# Patient Record
Sex: MALE | Race: WHITE | Employment: OTHER | URBAN - METROPOLITAN AREA
[De-identification: names, ages, dates, MRNs, and addresses within clinical notes are randomized per-mention and may not be internally consistent; named-entity substitution may affect disease eponyms.]

---

## 2017-04-11 PROBLEM — K21.9 GASTROESOPHAGEAL REFLUX DISEASE WITHOUT ESOPHAGITIS: Status: ACTIVE | Noted: 2017-04-11

## 2017-10-25 PROBLEM — M54.2 CERVICALGIA: Status: ACTIVE | Noted: 2017-10-25

## 2018-04-30 ENCOUNTER — OFFICE VISIT (OUTPATIENT)
Dept: FAMILY MEDICINE CLINIC | Age: 63
End: 2018-04-30
Payer: COMMERCIAL

## 2018-04-30 VITALS
WEIGHT: 224 LBS | RESPIRATION RATE: 20 BRPM | HEART RATE: 60 BPM | HEIGHT: 72 IN | OXYGEN SATURATION: 98 % | BODY MASS INDEX: 30.34 KG/M2 | SYSTOLIC BLOOD PRESSURE: 124 MMHG | DIASTOLIC BLOOD PRESSURE: 64 MMHG

## 2018-04-30 DIAGNOSIS — K21.9 GASTROESOPHAGEAL REFLUX DISEASE WITHOUT ESOPHAGITIS: ICD-10-CM

## 2018-04-30 DIAGNOSIS — B18.2 CHRONIC HEPATITIS C WITHOUT HEPATIC COMA (HCC): ICD-10-CM

## 2018-04-30 DIAGNOSIS — E11.42 TYPE 2 DIABETES MELLITUS WITH DIABETIC POLYNEUROPATHY, WITHOUT LONG-TERM CURRENT USE OF INSULIN (HCC): Primary | ICD-10-CM

## 2018-04-30 LAB — HBA1C MFR BLD: 7.2 %

## 2018-04-30 PROCEDURE — G8427 DOCREV CUR MEDS BY ELIG CLIN: HCPCS | Performed by: FAMILY MEDICINE

## 2018-04-30 PROCEDURE — G8417 CALC BMI ABV UP PARAM F/U: HCPCS | Performed by: FAMILY MEDICINE

## 2018-04-30 PROCEDURE — 83036 HEMOGLOBIN GLYCOSYLATED A1C: CPT | Performed by: FAMILY MEDICINE

## 2018-04-30 PROCEDURE — 3017F COLORECTAL CA SCREEN DOC REV: CPT | Performed by: FAMILY MEDICINE

## 2018-04-30 PROCEDURE — 1036F TOBACCO NON-USER: CPT | Performed by: FAMILY MEDICINE

## 2018-04-30 PROCEDURE — 2022F DILAT RTA XM EVC RTNOPTHY: CPT | Performed by: FAMILY MEDICINE

## 2018-04-30 PROCEDURE — 99214 OFFICE O/P EST MOD 30 MIN: CPT | Performed by: FAMILY MEDICINE

## 2018-04-30 PROCEDURE — 3045F PR MOST RECENT HEMOGLOBIN A1C LEVEL 7.0-9.0%: CPT | Performed by: FAMILY MEDICINE

## 2018-04-30 RX ORDER — LANOLIN ALCOHOL/MO/W.PET/CERES
3 CREAM (GRAM) TOPICAL DAILY
Qty: 30 TABLET | Refills: 5 | Status: SHIPPED | OUTPATIENT
Start: 2018-04-30 | End: 2018-11-27 | Stop reason: SDUPTHER

## 2018-04-30 RX ORDER — OMEPRAZOLE 20 MG/1
20 CAPSULE, DELAYED RELEASE ORAL DAILY
Qty: 30 CAPSULE | Refills: 2 | Status: SHIPPED | OUTPATIENT
Start: 2018-04-30 | End: 2019-04-02 | Stop reason: SDUPTHER

## 2018-04-30 RX ORDER — OMEPRAZOLE 40 MG/1
40 CAPSULE, DELAYED RELEASE ORAL
Refills: 3 | COMMUNITY
Start: 2018-03-28 | End: 2018-04-30

## 2018-04-30 ASSESSMENT — ENCOUNTER SYMPTOMS
DIARRHEA: 0
NAUSEA: 0
VOMITING: 0
SHORTNESS OF BREATH: 0
BLURRED VISION: 0

## 2018-05-17 ENCOUNTER — HOSPITAL ENCOUNTER (OUTPATIENT)
Age: 63
Discharge: HOME | End: 2018-05-17
Payer: COMMERCIAL

## 2018-05-17 LAB
ALBUMIN SERPL-MCNC: 4 G/DL (ref 3.5–5.2)
ALP BLD-CCNC: 98 U/L (ref 40–129)
ALT SERPL-CCNC: 110 U/L (ref 0–40)
AMPHETAMINE SCREEN, URINE: NOT DETECTED
ANION GAP SERPL CALCULATED.3IONS-SCNC: 10 MMOL/L (ref 7–16)
AST SERPL-CCNC: 57 U/L (ref 0–39)
BARBITURATE SCREEN URINE: NOT DETECTED
BASOPHILS ABSOLUTE: 0.05 E9/L (ref 0–0.2)
BASOPHILS RELATIVE PERCENT: 0.8 % (ref 0–2)
BENZODIAZEPINE SCREEN, URINE: NOT DETECTED
BILIRUB SERPL-MCNC: 0.5 MG/DL (ref 0–1.2)
BUN BLDV-MCNC: 20 MG/DL (ref 8–23)
CALCIUM SERPL-MCNC: 9.3 MG/DL (ref 8.6–10.2)
CANNABINOID SCREEN URINE: NOT DETECTED
CHLORIDE BLD-SCNC: 101 MMOL/L (ref 98–107)
CO2: 28 MMOL/L (ref 22–29)
COCAINE METABOLITE SCREEN URINE: NOT DETECTED
CREAT SERPL-MCNC: 1.1 MG/DL (ref 0.7–1.2)
EOSINOPHILS ABSOLUTE: 0.24 E9/L (ref 0.05–0.5)
EOSINOPHILS RELATIVE PERCENT: 3.6 % (ref 0–6)
GFR AFRICAN AMERICAN: >60
GFR NON-AFRICAN AMERICAN: >60 ML/MIN/1.73
GLUCOSE BLD-MCNC: 241 MG/DL (ref 74–109)
HCT VFR BLD CALC: 43.8 % (ref 37–54)
HEMOGLOBIN: 15.4 G/DL (ref 12.5–16.5)
IMMATURE GRANULOCYTES #: 0.02 E9/L
IMMATURE GRANULOCYTES %: 0.3 % (ref 0–5)
INR BLD: 1
LYMPHOCYTES ABSOLUTE: 1.96 E9/L (ref 1.5–4)
LYMPHOCYTES RELATIVE PERCENT: 29.5 % (ref 20–42)
MCH RBC QN AUTO: 32.8 PG (ref 26–35)
MCHC RBC AUTO-ENTMCNC: 35.2 % (ref 32–34.5)
MCV RBC AUTO: 93.4 FL (ref 80–99.9)
METHADONE SCREEN, URINE: NOT DETECTED
MONOCYTES ABSOLUTE: 0.6 E9/L (ref 0.1–0.95)
MONOCYTES RELATIVE PERCENT: 9 % (ref 2–12)
NEUTROPHILS ABSOLUTE: 3.78 E9/L (ref 1.8–7.3)
NEUTROPHILS RELATIVE PERCENT: 56.8 % (ref 43–80)
OPIATE SCREEN URINE: NOT DETECTED
PDW BLD-RTO: 11.4 FL (ref 11.5–15)
PHENCYCLIDINE SCREEN URINE: NOT DETECTED
PLATELET # BLD: 223 E9/L (ref 130–450)
PMV BLD AUTO: 10.4 FL (ref 7–12)
POTASSIUM SERPL-SCNC: 4.5 MMOL/L (ref 3.5–5)
PROPOXYPHENE SCREEN: NOT DETECTED
PROTHROMBIN TIME: 11.3 SEC (ref 9.3–12.4)
RBC # BLD: 4.69 E12/L (ref 3.8–5.8)
SODIUM BLD-SCNC: 139 MMOL/L (ref 132–146)
TOTAL PROTEIN: 7 G/DL (ref 6.4–8.3)
WBC # BLD: 6.7 E9/L (ref 4.5–11.5)

## 2018-05-17 PROCEDURE — 80053 COMPREHEN METABOLIC PANEL: CPT

## 2018-05-17 PROCEDURE — 87522 HEPATITIS C REVRS TRNSCRPJ: CPT

## 2018-05-17 PROCEDURE — 80307 DRUG TEST PRSMV CHEM ANLYZR: CPT

## 2018-05-17 PROCEDURE — 85025 COMPLETE CBC W/AUTO DIFF WBC: CPT

## 2018-05-17 PROCEDURE — 36415 COLL VENOUS BLD VENIPUNCTURE: CPT

## 2018-05-17 PROCEDURE — 85610 PROTHROMBIN TIME: CPT

## 2018-05-20 LAB — ALCOHOL URINE: NOT DETECTED MG/DL

## 2018-05-21 LAB
EER HCV RNA QNT PCR: ABNORMAL
HCV RNA QNT REAL-TIME PCR INTERP: DETECTED
HCV RNA, QUANTITATIVE REAL TIME PCR: 6.5 LOG IU
HEPATITIS C RNA PCR QUANT: ABNORMAL IU/ML

## 2018-06-15 ENCOUNTER — HOSPITAL ENCOUNTER (OUTPATIENT)
Age: 63
Discharge: HOME | End: 2018-06-15
Payer: COMMERCIAL

## 2018-06-15 LAB
ALCOHOL URINE: NOT DETECTED MG/DL
AMPHETAMINE SCREEN, URINE: NOT DETECTED
BARBITURATE SCREEN URINE: NOT DETECTED
BENZODIAZEPINE SCREEN, URINE: NOT DETECTED
CANNABINOID SCREEN URINE: NOT DETECTED
COCAINE METABOLITE SCREEN URINE: NOT DETECTED
METHADONE SCREEN, URINE: NOT DETECTED
OPIATE SCREEN URINE: NOT DETECTED
PHENCYCLIDINE SCREEN URINE: NOT DETECTED
PROPOXYPHENE SCREEN: NOT DETECTED

## 2018-06-15 PROCEDURE — 80307 DRUG TEST PRSMV CHEM ANLYZR: CPT

## 2018-07-16 ENCOUNTER — HOSPITAL ENCOUNTER (OUTPATIENT)
Age: 63
Discharge: HOME | End: 2018-07-16
Payer: COMMERCIAL

## 2018-07-16 PROCEDURE — 80307 DRUG TEST PRSMV CHEM ANLYZR: CPT

## 2018-07-30 ENCOUNTER — OFFICE VISIT (OUTPATIENT)
Dept: FAMILY MEDICINE CLINIC | Age: 63
End: 2018-07-30
Payer: COMMERCIAL

## 2018-07-30 VITALS
RESPIRATION RATE: 20 BRPM | HEIGHT: 72 IN | OXYGEN SATURATION: 98 % | HEART RATE: 56 BPM | BODY MASS INDEX: 29.66 KG/M2 | DIASTOLIC BLOOD PRESSURE: 78 MMHG | SYSTOLIC BLOOD PRESSURE: 124 MMHG | WEIGHT: 219 LBS

## 2018-07-30 DIAGNOSIS — F33.1 MODERATE EPISODE OF RECURRENT MAJOR DEPRESSIVE DISORDER (HCC): ICD-10-CM

## 2018-07-30 DIAGNOSIS — E11.42 TYPE 2 DIABETES MELLITUS WITH DIABETIC POLYNEUROPATHY, WITHOUT LONG-TERM CURRENT USE OF INSULIN (HCC): Primary | ICD-10-CM

## 2018-07-30 DIAGNOSIS — T07.XXXA ABRASIONS OF MULTIPLE SITES: ICD-10-CM

## 2018-07-30 LAB — HBA1C MFR BLD: 7.6 %

## 2018-07-30 PROCEDURE — G8427 DOCREV CUR MEDS BY ELIG CLIN: HCPCS | Performed by: FAMILY MEDICINE

## 2018-07-30 PROCEDURE — 2022F DILAT RTA XM EVC RTNOPTHY: CPT | Performed by: FAMILY MEDICINE

## 2018-07-30 PROCEDURE — 3017F COLORECTAL CA SCREEN DOC REV: CPT | Performed by: FAMILY MEDICINE

## 2018-07-30 PROCEDURE — 3045F PR MOST RECENT HEMOGLOBIN A1C LEVEL 7.0-9.0%: CPT | Performed by: FAMILY MEDICINE

## 2018-07-30 PROCEDURE — G8417 CALC BMI ABV UP PARAM F/U: HCPCS | Performed by: FAMILY MEDICINE

## 2018-07-30 PROCEDURE — 1036F TOBACCO NON-USER: CPT | Performed by: FAMILY MEDICINE

## 2018-07-30 PROCEDURE — 83036 HEMOGLOBIN GLYCOSYLATED A1C: CPT | Performed by: FAMILY MEDICINE

## 2018-07-30 PROCEDURE — 99214 OFFICE O/P EST MOD 30 MIN: CPT | Performed by: FAMILY MEDICINE

## 2018-07-30 RX ORDER — METFORMIN HYDROCHLORIDE 500 MG/1
500 TABLET, EXTENDED RELEASE ORAL
Qty: 30 TABLET | Refills: 3 | Status: SHIPPED | OUTPATIENT
Start: 2018-07-30 | End: 2018-10-03 | Stop reason: SINTOL

## 2018-07-30 ASSESSMENT — PATIENT HEALTH QUESTIONNAIRE - PHQ9
2. FEELING DOWN, DEPRESSED OR HOPELESS: 1
SUM OF ALL RESPONSES TO PHQ9 QUESTIONS 1 & 2: 1
1. LITTLE INTEREST OR PLEASURE IN DOING THINGS: 0
SUM OF ALL RESPONSES TO PHQ QUESTIONS 1-9: 1

## 2018-07-30 ASSESSMENT — ENCOUNTER SYMPTOMS
BLURRED VISION: 0
VOMITING: 0
SHORTNESS OF BREATH: 0
DIARRHEA: 0
NAUSEA: 0

## 2018-07-30 NOTE — PROGRESS NOTES
 Years of education: N/A     Social History Main Topics    Smoking status: Never Smoker    Smokeless tobacco: Never Used    Alcohol use No    Drug use: No    Sexual activity: Yes     Partners: Female     Other Topics Concern    None     Social History Narrative    None       I have reviewed Nakia's allergies, medications, problem list, medical, social and family history and have updated as needed in the electronic medical record    Current Outpatient Prescriptions   Medication Sig Dispense Refill    aspirin 81 MG tablet Take 81 mg by mouth daily      metFORMIN (GLUCOPHAGE-XR) 500 MG extended release tablet Take 1 tablet by mouth daily (with breakfast) 30 tablet 3    mupirocin (BACTROBAN) 2 % ointment Apply 3 times daily. 45 Tube 3    omeprazole (PRILOSEC) 20 MG delayed release capsule Take 1 capsule by mouth daily 30 capsule 2    glucose blood VI test strips (FREESTYLE LITE) strip Check blood sugar  strip 12    diclofenac sodium 1 % GEL Apply 2 g topically 2 times daily 1 Tube 5    melatonin (RA MELATONIN) 3 MG TABS tablet Take 1 tablet by mouth daily 30 tablet 5    DULoxetine (CYMBALTA) 60 MG extended release capsule 30 mg daily   2    Blood Glucose Monitoring Suppl (FREESTYLE LITE) VASU Check blood sugar BID 1 Device 0     No current facility-administered medications for this visit. Review Of Systems:    Review of Systems   Eyes: Negative for blurred vision. Respiratory: Negative for shortness of breath. Cardiovascular: Negative for chest pain, palpitations and leg swelling. Gastrointestinal: Negative for diarrhea, nausea and vomiting. Genitourinary: Negative for dysuria, frequency and urgency. +nocturia   Skin: Negative for rash. Psychiatric/Behavioral: Negative for depression.            OBJECTIVE:     VS:  Wt Readings from Last 3 Encounters:   07/30/18 219 lb (99.3 kg)   04/30/18 224 lb (101.6 kg)   01/31/18 228 lb (103.4 kg)                   Vitals:

## 2018-08-07 ENCOUNTER — HOSPITAL ENCOUNTER (OUTPATIENT)
Age: 63
Discharge: HOME | End: 2018-08-07
Payer: COMMERCIAL

## 2018-08-07 LAB
ALBUMIN SERPL-MCNC: 4.2 G/DL (ref 3.5–5.2)
ALP BLD-CCNC: 80 U/L (ref 40–129)
ALT SERPL-CCNC: 100 U/L (ref 0–40)
ANION GAP SERPL CALCULATED.3IONS-SCNC: 10 MMOL/L (ref 7–16)
AST SERPL-CCNC: 63 U/L (ref 0–39)
BASOPHILS ABSOLUTE: 0.04 E9/L (ref 0–0.2)
BASOPHILS RELATIVE PERCENT: 0.7 % (ref 0–2)
BILIRUB SERPL-MCNC: 0.8 MG/DL (ref 0–1.2)
BUN BLDV-MCNC: 19 MG/DL (ref 8–23)
CALCIUM SERPL-MCNC: 9.6 MG/DL (ref 8.6–10.2)
CHLORIDE BLD-SCNC: 101 MMOL/L (ref 98–107)
CO2: 29 MMOL/L (ref 22–29)
CREAT SERPL-MCNC: 1.1 MG/DL (ref 0.7–1.2)
EOSINOPHILS ABSOLUTE: 0.19 E9/L (ref 0.05–0.5)
EOSINOPHILS RELATIVE PERCENT: 3.2 % (ref 0–6)
GFR AFRICAN AMERICAN: >60
GFR NON-AFRICAN AMERICAN: >60 ML/MIN/1.73
GLUCOSE BLD-MCNC: 117 MG/DL (ref 74–109)
HCT VFR BLD CALC: 45.2 % (ref 37–54)
HEMOGLOBIN: 15.4 G/DL (ref 12.5–16.5)
IMMATURE GRANULOCYTES #: 0.03 E9/L
IMMATURE GRANULOCYTES %: 0.5 % (ref 0–5)
LYMPHOCYTES ABSOLUTE: 1.96 E9/L (ref 1.5–4)
LYMPHOCYTES RELATIVE PERCENT: 33.1 % (ref 20–42)
MCH RBC QN AUTO: 31.8 PG (ref 26–35)
MCHC RBC AUTO-ENTMCNC: 34.1 % (ref 32–34.5)
MCV RBC AUTO: 93.2 FL (ref 80–99.9)
MONOCYTES ABSOLUTE: 0.58 E9/L (ref 0.1–0.95)
MONOCYTES RELATIVE PERCENT: 9.8 % (ref 2–12)
NEUTROPHILS ABSOLUTE: 3.13 E9/L (ref 1.8–7.3)
NEUTROPHILS RELATIVE PERCENT: 52.7 % (ref 43–80)
PDW BLD-RTO: 11.5 FL (ref 11.5–15)
PLATELET # BLD: 240 E9/L (ref 130–450)
PMV BLD AUTO: 10.3 FL (ref 7–12)
POTASSIUM SERPL-SCNC: 4.2 MMOL/L (ref 3.5–5)
RBC # BLD: 4.85 E12/L (ref 3.8–5.8)
SODIUM BLD-SCNC: 140 MMOL/L (ref 132–146)
TOTAL PROTEIN: 7.2 G/DL (ref 6.4–8.3)
WBC # BLD: 5.9 E9/L (ref 4.5–11.5)

## 2018-08-07 PROCEDURE — 36415 COLL VENOUS BLD VENIPUNCTURE: CPT

## 2018-08-07 PROCEDURE — 80053 COMPREHEN METABOLIC PANEL: CPT

## 2018-08-07 PROCEDURE — 86706 HEP B SURFACE ANTIBODY: CPT

## 2018-08-07 PROCEDURE — 87536 HIV-1 QUANT&REVRSE TRNSCRPJ: CPT

## 2018-08-07 PROCEDURE — 85025 COMPLETE CBC W/AUTO DIFF WBC: CPT

## 2018-08-07 PROCEDURE — 82105 ALPHA-FETOPROTEIN SERUM: CPT

## 2018-08-08 LAB — HBV SURFACE AB TITR SER: NORMAL {TITER}

## 2018-08-09 LAB — AFP-TUMOR MARKER: 7 NG/ML (ref 0–9)

## 2018-08-11 LAB
DIRECT EXAM: NORMAL
SPECIMEN: NORMAL

## 2018-08-13 ENCOUNTER — TELEPHONE (OUTPATIENT)
Dept: FAMILY MEDICINE CLINIC | Age: 63
End: 2018-08-13

## 2018-08-28 ENCOUNTER — HOSPITAL ENCOUNTER (OUTPATIENT)
Age: 63
Discharge: HOME | End: 2018-08-28
Payer: COMMERCIAL

## 2018-08-28 LAB
ALBUMIN SERPL-MCNC: 4.1 G/DL (ref 3.5–5.2)
ALP BLD-CCNC: 86 U/L (ref 40–129)
ALT SERPL-CCNC: 22 U/L (ref 0–40)
ANION GAP SERPL CALCULATED.3IONS-SCNC: 10 MMOL/L (ref 7–16)
AST SERPL-CCNC: 17 U/L (ref 0–39)
BASOPHILS ABSOLUTE: 0.07 E9/L (ref 0–0.2)
BASOPHILS RELATIVE PERCENT: 1.2 % (ref 0–2)
BILIRUB SERPL-MCNC: 0.6 MG/DL (ref 0–1.2)
BUN BLDV-MCNC: 20 MG/DL (ref 8–23)
CALCIUM SERPL-MCNC: 9.4 MG/DL (ref 8.6–10.2)
CHLORIDE BLD-SCNC: 100 MMOL/L (ref 98–107)
CO2: 28 MMOL/L (ref 22–29)
CREAT SERPL-MCNC: 1 MG/DL (ref 0.7–1.2)
EOSINOPHILS ABSOLUTE: 0.26 E9/L (ref 0.05–0.5)
EOSINOPHILS RELATIVE PERCENT: 4.5 % (ref 0–6)
GFR AFRICAN AMERICAN: >60
GFR NON-AFRICAN AMERICAN: >60 ML/MIN/1.73
GLUCOSE BLD-MCNC: 241 MG/DL (ref 74–109)
HCT VFR BLD CALC: 43.8 % (ref 37–54)
HEMOGLOBIN: 15.2 G/DL (ref 12.5–16.5)
IMMATURE GRANULOCYTES #: 0.02 E9/L
IMMATURE GRANULOCYTES %: 0.3 % (ref 0–5)
LYMPHOCYTES ABSOLUTE: 2.05 E9/L (ref 1.5–4)
LYMPHOCYTES RELATIVE PERCENT: 35.2 % (ref 20–42)
MCH RBC QN AUTO: 32.1 PG (ref 26–35)
MCHC RBC AUTO-ENTMCNC: 34.7 % (ref 32–34.5)
MCV RBC AUTO: 92.6 FL (ref 80–99.9)
MONOCYTES ABSOLUTE: 0.54 E9/L (ref 0.1–0.95)
MONOCYTES RELATIVE PERCENT: 9.3 % (ref 2–12)
NEUTROPHILS ABSOLUTE: 2.89 E9/L (ref 1.8–7.3)
NEUTROPHILS RELATIVE PERCENT: 49.5 % (ref 43–80)
PDW BLD-RTO: 11.6 FL (ref 11.5–15)
PLATELET # BLD: 229 E9/L (ref 130–450)
PMV BLD AUTO: 10.2 FL (ref 7–12)
POTASSIUM SERPL-SCNC: 4.6 MMOL/L (ref 3.5–5)
RBC # BLD: 4.73 E12/L (ref 3.8–5.8)
SODIUM BLD-SCNC: 138 MMOL/L (ref 132–146)
TOTAL PROTEIN: 7.1 G/DL (ref 6.4–8.3)
WBC # BLD: 5.8 E9/L (ref 4.5–11.5)

## 2018-08-28 PROCEDURE — 80053 COMPREHEN METABOLIC PANEL: CPT

## 2018-08-28 PROCEDURE — 36415 COLL VENOUS BLD VENIPUNCTURE: CPT

## 2018-08-28 PROCEDURE — 85025 COMPLETE CBC W/AUTO DIFF WBC: CPT

## 2018-10-03 ENCOUNTER — OFFICE VISIT (OUTPATIENT)
Dept: FAMILY MEDICINE CLINIC | Age: 63
End: 2018-10-03
Payer: COMMERCIAL

## 2018-10-03 VITALS
HEIGHT: 72 IN | DIASTOLIC BLOOD PRESSURE: 62 MMHG | RESPIRATION RATE: 20 BRPM | SYSTOLIC BLOOD PRESSURE: 124 MMHG | OXYGEN SATURATION: 98 % | BODY MASS INDEX: 28.85 KG/M2 | HEART RATE: 54 BPM | WEIGHT: 213 LBS

## 2018-10-03 DIAGNOSIS — Z12.5 PROSTATE CANCER SCREENING: ICD-10-CM

## 2018-10-03 DIAGNOSIS — R05.9 COUGH: ICD-10-CM

## 2018-10-03 DIAGNOSIS — E11.42 TYPE 2 DIABETES MELLITUS WITH DIABETIC POLYNEUROPATHY, WITHOUT LONG-TERM CURRENT USE OF INSULIN (HCC): Primary | ICD-10-CM

## 2018-10-03 DIAGNOSIS — K21.9 GASTROESOPHAGEAL REFLUX DISEASE WITHOUT ESOPHAGITIS: Chronic | ICD-10-CM

## 2018-10-03 LAB — HBA1C MFR BLD: 7.1 %

## 2018-10-03 PROCEDURE — G8417 CALC BMI ABV UP PARAM F/U: HCPCS | Performed by: FAMILY MEDICINE

## 2018-10-03 PROCEDURE — G8484 FLU IMMUNIZE NO ADMIN: HCPCS | Performed by: FAMILY MEDICINE

## 2018-10-03 PROCEDURE — 1036F TOBACCO NON-USER: CPT | Performed by: FAMILY MEDICINE

## 2018-10-03 PROCEDURE — 3045F PR MOST RECENT HEMOGLOBIN A1C LEVEL 7.0-9.0%: CPT | Performed by: FAMILY MEDICINE

## 2018-10-03 PROCEDURE — G8427 DOCREV CUR MEDS BY ELIG CLIN: HCPCS | Performed by: FAMILY MEDICINE

## 2018-10-03 PROCEDURE — 3017F COLORECTAL CA SCREEN DOC REV: CPT | Performed by: FAMILY MEDICINE

## 2018-10-03 PROCEDURE — 99214 OFFICE O/P EST MOD 30 MIN: CPT | Performed by: FAMILY MEDICINE

## 2018-10-03 PROCEDURE — 83036 HEMOGLOBIN GLYCOSYLATED A1C: CPT | Performed by: FAMILY MEDICINE

## 2018-10-03 PROCEDURE — 2022F DILAT RTA XM EVC RTNOPTHY: CPT | Performed by: FAMILY MEDICINE

## 2018-10-03 RX ORDER — DEXTROMETHORPHAN HYDROBROMIDE AND PROMETHAZINE HYDROCHLORIDE 15; 6.25 MG/5ML; MG/5ML
5 SYRUP ORAL 4 TIMES DAILY PRN
Qty: 240 ML | Refills: 0 | Status: SHIPPED | OUTPATIENT
Start: 2018-10-03 | End: 2019-01-02

## 2018-10-03 RX ORDER — GABAPENTIN 300 MG/1
300 CAPSULE ORAL 3 TIMES DAILY
COMMUNITY

## 2018-10-03 ASSESSMENT — PATIENT HEALTH QUESTIONNAIRE - PHQ9
1. LITTLE INTEREST OR PLEASURE IN DOING THINGS: 0
2. FEELING DOWN, DEPRESSED OR HOPELESS: 0
SUM OF ALL RESPONSES TO PHQ QUESTIONS 1-9: 0
SUM OF ALL RESPONSES TO PHQ QUESTIONS 1-9: 0
SUM OF ALL RESPONSES TO PHQ9 QUESTIONS 1 & 2: 0

## 2018-10-03 ASSESSMENT — ENCOUNTER SYMPTOMS
SORE THROAT: 0
WHEEZING: 1
NAUSEA: 0
VOMITING: 0
SHORTNESS OF BREATH: 0
COUGH: 1
DIARRHEA: 0
BLURRED VISION: 0

## 2018-10-03 NOTE — PROGRESS NOTES
OFFICE PROGRESS NOTE      SUBJECTIVE:        Patient ID:   Jazz Dover is a 65 y.o. male who presents for   Chief Complaint   Patient presents with    Diabetes       HPI:   Patient is here to follow up on diabetes. Fasting blood sugars:100-110's Midday blood sugars: not checking. Patient checks blood glucose 1 times per day. Patient is following diabetic diet. Patient is a nonsmoker. Last ophthalmology visit: 2/18. Patient is not able to tolerate daily statin. Patient has had cough for past 2 weeks--green sputum. Patient has been wheezing also. Patient doing well on current regimen for GERD. Prior to Admission medications    Medication Sig Start Date End Date Taking? Authorizing Provider   gabapentin (NEURONTIN) 300 MG capsule Take 300 mg by mouth 3 times daily. .   Yes Historical Provider, MD   metFORMIN (GLUCOPHAGE) 500 MG tablet Take 1 tablet by mouth 2 times daily (with meals) 10/3/18  Yes Alonzo Camacho MD   promethazine-dextromethorphan (PROMETHAZINE-DM) 6.25-15 MG/5ML syrup Take 5 mLs by mouth 4 times daily as needed for Cough 10/3/18  Yes Alonzo Camacho MD   Glecaprevir-Pibrentasvir (MAVYRET) 100-40 MG TABS Take 3 tablets by mouth daily   Yes Historical Provider, MD   aspirin 81 MG tablet Take 81 mg by mouth daily   Yes Historical Provider, MD   omeprazole (PRILOSEC) 20 MG delayed release capsule Take 1 capsule by mouth daily 4/30/18  Yes Alonzo Camacho MD   glucose blood VI test strips (FREESTYLE LITE) strip Check blood sugar BID 4/30/18  Yes Alonzo Camacho MD   diclofenac sodium 1 % GEL Apply 2 g topically 2 times daily 4/30/18  Yes Alonzo Camacho MD   melatonin (RA MELATONIN) 3 MG TABS tablet Take 1 tablet by mouth daily 4/30/18  Yes Alonzo Camacho MD   Blood Glucose Monitoring Suppl (FREESTYLE LITE) VASU Check blood sugar BID 3/23/16  Yes Alonzo Camacho MD     Social History     Social History    urgency. Skin: Negative for rash. Psychiatric/Behavioral: Negative for depression. OBJECTIVE:     VS:  Wt Readings from Last 3 Encounters:   10/03/18 213 lb (96.6 kg)   07/30/18 219 lb (99.3 kg)   04/30/18 224 lb (101.6 kg)                   Vitals:    10/03/18 0914   BP: 124/62   Pulse: 54   Resp: 20   SpO2: 98%       Physical Exam   Constitutional: He is oriented to person, place, and time and well-developed, well-nourished, and in no distress. Neck: Neck supple. Carotid bruit is not present. Cardiovascular: Normal rate, regular rhythm and normal heart sounds. Exam reveals no gallop. No murmur heard. Pulmonary/Chest: Effort normal and breath sounds normal. He has no wheezes. He has no rales. Abdominal: Soft. Bowel sounds are normal. He exhibits no distension. There is no tenderness. Musculoskeletal: He exhibits no edema. Neurological: He is alert and oriented to person, place, and time. Skin: Skin is warm and dry. Psychiatric: Affect normal.   Vitals reviewed. Results for orders placed or performed in visit on 10/03/18   POCT glycosylated hemoglobin (Hb A1C)   Result Value Ref Range    Hemoglobin A1C 7.1 %         Nakia was seen today for diabetes. Diagnoses and all orders for this visit:    Type 2 diabetes mellitus with diabetic polyneuropathy, without long-term current use of insulin ([facility])  -     POCT glycosylated hemoglobin (Hb A1C)  -     metFORMIN (GLUCOPHAGE) 500 MG tablet; Take 1 tablet by mouth 2 times daily (with meals)  -     CBC; Future  -     Comprehensive Metabolic Panel; Future  -     Lipid Panel; Future  -     Microalbumin / Creatinine Urine Ratio; Future  -     Diabetic Foot Exam    Gastroesophageal reflux disease without esophagitis        -     Continue omeprazole    Cough  -     promethazine-dextromethorphan (PROMETHAZINE-DM) 6.25-15 MG/5ML syrup;  Take 5 mLs by mouth 4 times daily as needed for Cough    Prostate cancer screening  -     Psa screening;

## 2018-10-03 NOTE — PATIENT INSTRUCTIONS
Version: 11.7  © 5681-0155 [employer]. Care instructions adapted under license by [facility] [place] ([facility]). If you have questions about a medical condition or this instruction, always ask your healthcare professional. [address] any warranty or liability for your use of this information.

## 2018-10-05 ENCOUNTER — HOSPITAL ENCOUNTER (OUTPATIENT)
Age: 63
Discharge: HOME | End: 2018-10-05
Payer: COMMERCIAL

## 2018-10-05 LAB
ALBUMIN SERPL-MCNC: 3.8 G/DL (ref 3.5–5.2)
ALP BLD-CCNC: 89 U/L (ref 40–129)
ALT SERPL-CCNC: 16 U/L (ref 0–40)
ANION GAP SERPL CALCULATED.3IONS-SCNC: 12 MMOL/L (ref 7–16)
AST SERPL-CCNC: 16 U/L (ref 0–39)
BASOPHILS ABSOLUTE: 0.05 E9/L (ref 0–0.2)
BASOPHILS RELATIVE PERCENT: 0.8 % (ref 0–2)
BILIRUB SERPL-MCNC: 0.5 MG/DL (ref 0–1.2)
BUN BLDV-MCNC: 18 MG/DL (ref 8–23)
CALCIUM SERPL-MCNC: 9.2 MG/DL (ref 8.6–10.2)
CHLORIDE BLD-SCNC: 101 MMOL/L (ref 98–107)
CO2: 27 MMOL/L (ref 22–29)
CREAT SERPL-MCNC: 1.1 MG/DL (ref 0.7–1.2)
EOSINOPHILS ABSOLUTE: 0.19 E9/L (ref 0.05–0.5)
EOSINOPHILS RELATIVE PERCENT: 3.2 % (ref 0–6)
GFR AFRICAN AMERICAN: >60
GFR NON-AFRICAN AMERICAN: >60 ML/MIN/1.73
GLUCOSE BLD-MCNC: 289 MG/DL (ref 74–109)
HCT VFR BLD CALC: 42 % (ref 37–54)
HEMOGLOBIN: 14.1 G/DL (ref 12.5–16.5)
IMMATURE GRANULOCYTES #: 0.07 E9/L
IMMATURE GRANULOCYTES %: 1.2 % (ref 0–5)
LYMPHOCYTES ABSOLUTE: 1.85 E9/L (ref 1.5–4)
LYMPHOCYTES RELATIVE PERCENT: 31.3 % (ref 20–42)
MCH RBC QN AUTO: 31.8 PG (ref 26–35)
MCHC RBC AUTO-ENTMCNC: 33.6 % (ref 32–34.5)
MCV RBC AUTO: 94.8 FL (ref 80–99.9)
MONOCYTES ABSOLUTE: 0.44 E9/L (ref 0.1–0.95)
MONOCYTES RELATIVE PERCENT: 7.4 % (ref 2–12)
NEUTROPHILS ABSOLUTE: 3.31 E9/L (ref 1.8–7.3)
NEUTROPHILS RELATIVE PERCENT: 56.1 % (ref 43–80)
PDW BLD-RTO: 11.4 FL (ref 11.5–15)
PLATELET # BLD: 280 E9/L (ref 130–450)
PMV BLD AUTO: 10.1 FL (ref 7–12)
POTASSIUM SERPL-SCNC: 5.1 MMOL/L (ref 3.5–5)
RBC # BLD: 4.43 E12/L (ref 3.8–5.8)
SODIUM BLD-SCNC: 140 MMOL/L (ref 132–146)
TOTAL PROTEIN: 6.9 G/DL (ref 6.4–8.3)
WBC # BLD: 5.9 E9/L (ref 4.5–11.5)

## 2018-10-05 PROCEDURE — 80053 COMPREHEN METABOLIC PANEL: CPT

## 2018-10-05 PROCEDURE — 36415 COLL VENOUS BLD VENIPUNCTURE: CPT

## 2018-10-05 PROCEDURE — 85025 COMPLETE CBC W/AUTO DIFF WBC: CPT

## 2018-10-05 PROCEDURE — 87522 HEPATITIS C REVRS TRNSCRPJ: CPT

## 2018-11-28 RX ORDER — LANOLIN ALCOHOL/MO/W.PET/CERES
CREAM (GRAM) TOPICAL
Qty: 30 TABLET | Refills: 4 | Status: SHIPPED | OUTPATIENT
Start: 2018-11-28 | End: 2019-04-20 | Stop reason: SDUPTHER

## 2018-11-30 LAB
HCV QNT BY NAAT IU/ML: NOT DETECTED IU/ML
HCV QNT BY NAAT LOG IU/ML: NOT DETECTED LOG IU/ML

## 2018-12-11 ENCOUNTER — HOSPITAL ENCOUNTER (OUTPATIENT)
Age: 63
Discharge: HOME | End: 2018-12-11
Payer: COMMERCIAL

## 2018-12-11 DIAGNOSIS — E11.42 TYPE 2 DIABETES MELLITUS WITH DIABETIC POLYNEUROPATHY, WITHOUT LONG-TERM CURRENT USE OF INSULIN (HCC): ICD-10-CM

## 2018-12-11 DIAGNOSIS — Z12.5 PROSTATE CANCER SCREENING: ICD-10-CM

## 2018-12-11 LAB
ALBUMIN SERPL-MCNC: 4.2 G/DL (ref 3.5–5.2)
ALP BLD-CCNC: 74 U/L (ref 40–129)
ALT SERPL-CCNC: 29 U/L (ref 0–40)
ANION GAP SERPL CALCULATED.3IONS-SCNC: 9 MMOL/L (ref 7–16)
AST SERPL-CCNC: 22 U/L (ref 0–39)
BILIRUB SERPL-MCNC: 0.6 MG/DL (ref 0–1.2)
BUN BLDV-MCNC: 17 MG/DL (ref 8–23)
CALCIUM SERPL-MCNC: 9.4 MG/DL (ref 8.6–10.2)
CHLORIDE BLD-SCNC: 99 MMOL/L (ref 98–107)
CHOLESTEROL, TOTAL: 161 MG/DL (ref 0–199)
CO2: 29 MMOL/L (ref 22–29)
CREAT SERPL-MCNC: 1.1 MG/DL (ref 0.7–1.2)
CREATININE URINE: 235 MG/DL (ref 40–278)
GFR AFRICAN AMERICAN: >60
GFR NON-AFRICAN AMERICAN: >60 ML/MIN/1.73
GLUCOSE BLD-MCNC: 177 MG/DL (ref 74–99)
HCT VFR BLD CALC: 44.4 % (ref 37–54)
HDLC SERPL-MCNC: 35 MG/DL
HEMOGLOBIN: 15.2 G/DL (ref 12.5–16.5)
LDL CHOLESTEROL CALCULATED: 101 MG/DL (ref 0–99)
MCH RBC QN AUTO: 31.9 PG (ref 26–35)
MCHC RBC AUTO-ENTMCNC: 34.2 % (ref 32–34.5)
MCV RBC AUTO: 93.3 FL (ref 80–99.9)
MICROALBUMIN UR-MCNC: <12 MG/L
MICROALBUMIN/CREAT UR-RTO: ABNORMAL (ref 0–30)
PDW BLD-RTO: 11.8 FL (ref 11.5–15)
PLATELET # BLD: 231 E9/L (ref 130–450)
PMV BLD AUTO: 10.5 FL (ref 7–12)
POTASSIUM SERPL-SCNC: 4.6 MMOL/L (ref 3.5–5)
PROSTATE SPECIFIC ANTIGEN: 2.94 NG/ML (ref 0–4)
RBC # BLD: 4.76 E12/L (ref 3.8–5.8)
SODIUM BLD-SCNC: 137 MMOL/L (ref 132–146)
TOTAL PROTEIN: 7.2 G/DL (ref 6.4–8.3)
TRIGL SERPL-MCNC: 127 MG/DL (ref 0–149)
VLDLC SERPL CALC-MCNC: 25 MG/DL
WBC # BLD: 6.5 E9/L (ref 4.5–11.5)

## 2018-12-11 PROCEDURE — 82044 UR ALBUMIN SEMIQUANTITATIVE: CPT

## 2018-12-11 PROCEDURE — 36415 COLL VENOUS BLD VENIPUNCTURE: CPT

## 2018-12-11 PROCEDURE — G0103 PSA SCREENING: HCPCS

## 2018-12-11 PROCEDURE — 82570 ASSAY OF URINE CREATININE: CPT

## 2018-12-11 PROCEDURE — 80061 LIPID PANEL: CPT

## 2018-12-11 PROCEDURE — 85027 COMPLETE CBC AUTOMATED: CPT

## 2018-12-11 PROCEDURE — 80053 COMPREHEN METABOLIC PANEL: CPT

## 2018-12-19 ENCOUNTER — HOSPITAL ENCOUNTER (OUTPATIENT)
Age: 63
Discharge: HOME | End: 2018-12-19
Payer: COMMERCIAL

## 2018-12-19 PROCEDURE — 87522 HEPATITIS C REVRS TRNSCRPJ: CPT

## 2018-12-22 LAB
HCV QNT BY NAAT IU/ML: NOT DETECTED IU/ML
HCV QNT BY NAAT LOG IU/ML: NOT DETECTED LOG IU/ML
INTERPRETATION: NOT DETECTED

## 2019-01-02 ENCOUNTER — OFFICE VISIT (OUTPATIENT)
Dept: FAMILY MEDICINE CLINIC | Age: 64
End: 2019-01-02
Payer: COMMERCIAL

## 2019-01-02 VITALS
BODY MASS INDEX: 30.61 KG/M2 | DIASTOLIC BLOOD PRESSURE: 74 MMHG | OXYGEN SATURATION: 98 % | HEIGHT: 72 IN | HEART RATE: 64 BPM | RESPIRATION RATE: 20 BRPM | WEIGHT: 226 LBS | SYSTOLIC BLOOD PRESSURE: 136 MMHG

## 2019-01-02 DIAGNOSIS — E11.42 TYPE 2 DIABETES MELLITUS WITH DIABETIC POLYNEUROPATHY, WITHOUT LONG-TERM CURRENT USE OF INSULIN (HCC): Primary | ICD-10-CM

## 2019-01-02 DIAGNOSIS — B18.2 CHRONIC HEPATITIS C WITHOUT HEPATIC COMA (HCC): ICD-10-CM

## 2019-01-02 DIAGNOSIS — E66.9 OBESITY (BMI 30-39.9): ICD-10-CM

## 2019-01-02 LAB — HBA1C MFR BLD: 6.7 %

## 2019-01-02 PROCEDURE — G8427 DOCREV CUR MEDS BY ELIG CLIN: HCPCS | Performed by: FAMILY MEDICINE

## 2019-01-02 PROCEDURE — 99213 OFFICE O/P EST LOW 20 MIN: CPT | Performed by: FAMILY MEDICINE

## 2019-01-02 PROCEDURE — 83036 HEMOGLOBIN GLYCOSYLATED A1C: CPT | Performed by: FAMILY MEDICINE

## 2019-01-02 PROCEDURE — G8484 FLU IMMUNIZE NO ADMIN: HCPCS | Performed by: FAMILY MEDICINE

## 2019-01-02 PROCEDURE — 3017F COLORECTAL CA SCREEN DOC REV: CPT | Performed by: FAMILY MEDICINE

## 2019-01-02 PROCEDURE — 3044F HG A1C LEVEL LT 7.0%: CPT | Performed by: FAMILY MEDICINE

## 2019-01-02 PROCEDURE — G8417 CALC BMI ABV UP PARAM F/U: HCPCS | Performed by: FAMILY MEDICINE

## 2019-01-02 PROCEDURE — 1036F TOBACCO NON-USER: CPT | Performed by: FAMILY MEDICINE

## 2019-01-02 PROCEDURE — 2022F DILAT RTA XM EVC RTNOPTHY: CPT | Performed by: FAMILY MEDICINE

## 2019-01-02 ASSESSMENT — ENCOUNTER SYMPTOMS
DIARRHEA: 0
NAUSEA: 0
VOMITING: 0
SHORTNESS OF BREATH: 1

## 2019-01-02 ASSESSMENT — PATIENT HEALTH QUESTIONNAIRE - PHQ9
2. FEELING DOWN, DEPRESSED OR HOPELESS: 0
SUM OF ALL RESPONSES TO PHQ QUESTIONS 1-9: 0
1. LITTLE INTEREST OR PLEASURE IN DOING THINGS: 0
SUM OF ALL RESPONSES TO PHQ9 QUESTIONS 1 & 2: 0
SUM OF ALL RESPONSES TO PHQ QUESTIONS 1-9: 0

## 2019-03-25 DIAGNOSIS — E11.42 TYPE 2 DIABETES MELLITUS WITH DIABETIC POLYNEUROPATHY, WITHOUT LONG-TERM CURRENT USE OF INSULIN (HCC): ICD-10-CM

## 2019-04-02 ENCOUNTER — OFFICE VISIT (OUTPATIENT)
Dept: FAMILY MEDICINE CLINIC | Age: 64
End: 2019-04-02
Payer: COMMERCIAL

## 2019-04-02 VITALS
WEIGHT: 226 LBS | HEIGHT: 72 IN | RESPIRATION RATE: 20 BRPM | HEART RATE: 80 BPM | SYSTOLIC BLOOD PRESSURE: 134 MMHG | BODY MASS INDEX: 30.61 KG/M2 | DIASTOLIC BLOOD PRESSURE: 80 MMHG | OXYGEN SATURATION: 98 %

## 2019-04-02 DIAGNOSIS — E11.42 TYPE 2 DIABETES MELLITUS WITH DIABETIC POLYNEUROPATHY, WITHOUT LONG-TERM CURRENT USE OF INSULIN (HCC): Primary | ICD-10-CM

## 2019-04-02 DIAGNOSIS — K21.9 GASTROESOPHAGEAL REFLUX DISEASE WITHOUT ESOPHAGITIS: ICD-10-CM

## 2019-04-02 DIAGNOSIS — M54.2 CERVICALGIA: ICD-10-CM

## 2019-04-02 LAB — HBA1C MFR BLD: 6.8 %

## 2019-04-02 PROCEDURE — 3017F COLORECTAL CA SCREEN DOC REV: CPT | Performed by: FAMILY MEDICINE

## 2019-04-02 PROCEDURE — 83036 HEMOGLOBIN GLYCOSYLATED A1C: CPT | Performed by: FAMILY MEDICINE

## 2019-04-02 PROCEDURE — 2022F DILAT RTA XM EVC RTNOPTHY: CPT | Performed by: FAMILY MEDICINE

## 2019-04-02 PROCEDURE — G8417 CALC BMI ABV UP PARAM F/U: HCPCS | Performed by: FAMILY MEDICINE

## 2019-04-02 PROCEDURE — 3044F HG A1C LEVEL LT 7.0%: CPT | Performed by: FAMILY MEDICINE

## 2019-04-02 PROCEDURE — G8427 DOCREV CUR MEDS BY ELIG CLIN: HCPCS | Performed by: FAMILY MEDICINE

## 2019-04-02 PROCEDURE — 1036F TOBACCO NON-USER: CPT | Performed by: FAMILY MEDICINE

## 2019-04-02 PROCEDURE — 99213 OFFICE O/P EST LOW 20 MIN: CPT | Performed by: FAMILY MEDICINE

## 2019-04-02 RX ORDER — OMEPRAZOLE 20 MG/1
20 CAPSULE, DELAYED RELEASE ORAL DAILY
Qty: 30 CAPSULE | Refills: 5 | Status: SHIPPED | OUTPATIENT
Start: 2019-04-02 | End: 2019-09-13 | Stop reason: SDUPTHER

## 2019-04-02 ASSESSMENT — PATIENT HEALTH QUESTIONNAIRE - PHQ9
SUM OF ALL RESPONSES TO PHQ9 QUESTIONS 1 & 2: 0
1. LITTLE INTEREST OR PLEASURE IN DOING THINGS: 0
SUM OF ALL RESPONSES TO PHQ QUESTIONS 1-9: 0
2. FEELING DOWN, DEPRESSED OR HOPELESS: 0
SUM OF ALL RESPONSES TO PHQ QUESTIONS 1-9: 0

## 2019-04-02 ASSESSMENT — ENCOUNTER SYMPTOMS
SHORTNESS OF BREATH: 0
VOMITING: 0
DIARRHEA: 0
NAUSEA: 0

## 2019-04-02 NOTE — PROGRESS NOTES
OFFICE PROGRESS NOTE      SUBJECTIVE:        Patient ID:   Marielena Franks is a 59 y.o. male who presents for   Chief Complaint   Patient presents with    Diabetes       HPI:   Patient is here to follow up on diabetes. Fasting blood sugars:104-107 Midday blood sugars: not checking. Patient checks blood glucose 1 times per day. Patient is following diabetic diet. Patient is a nonsmoker. Last ophthalmology visit: 1/23/2019. Patient is not able to tolerate statin. Patient doing well on current regimen for GERD. Patient doing well on current regimen for neck pain. Uses Voltaren gel prn. Prior to Admission medications    Medication Sig Start Date End Date Taking? Authorizing Provider   diclofenac sodium 1 % GEL Apply 2 g topically 2 times daily 4/2/19  Yes Jodie Mackey MD   blood glucose test strips (FREESTYLE LITE) strip Check blood sugar BID 4/2/19  Yes Jodie Mackey MD   omeprazole (PRILOSEC) 20 MG delayed release capsule Take 1 capsule by mouth daily 4/2/19  Yes Jodie Mackey MD   metFORMIN (GLUCOPHAGE) 500 MG tablet Take 1 tablet by mouth 2 times daily (with meals) 3/25/19  Yes Jodie Mackey MD   phenylephrine-cocoa butter (PREPARATION H) 0.25-88.44 % Place 1 suppository rectally 2 times daily as needed for Hemorrhoids or Itching 2/20/19  Yes Jodie Mackey MD   melatonin 3 MG TABS tablet TAKE ONE TABLET BY MOUTH EVERY DAY 11/28/18  Yes Jodie Mackey MD   gabapentin (NEURONTIN) 300 MG capsule Take 300 mg by mouth 3 times daily. Harmeet Mccray Historical Provider, MD   aspirin 81 MG tablet Take 81 mg by mouth daily   Yes Historical Provider, MD   diclofenac sodium 1 % GEL Apply 2 g topically 2 times daily 4/30/18  Yes Jodie Mackey MD   Blood Glucose Monitoring Suppl (FREESTYLE LITE) VASU Check blood sugar BID 3/23/16  Yes Jodie Mackey MD     Social History     Socioeconomic History    Marital status:      Spouse name: None    Number of children: None    Years of education: None    Highest education level: None   Occupational History    None   Social Needs    Financial resource strain: None    Food insecurity:     Worry: None     Inability: None    Transportation needs:     Medical: None     Non-medical: None   Tobacco Use    Smoking status: Never Smoker    Smokeless tobacco: Never Used   Substance and Sexual Activity    Alcohol use: No    Drug use: No    Sexual activity: Yes     Partners: Female   Lifestyle    Physical activity:     Days per week: None     Minutes per session: None    Stress: None   Relationships    Social connections:     Talks on phone: None     Gets together: None     Attends Restorationism service: None     Active member of club or organization: None     Attends meetings of clubs or organizations: None     Relationship status: None    Intimate partner violence:     Fear of current or ex partner: None     Emotionally abused: None     Physically abused: None     Forced sexual activity: None   Other Topics Concern    None   Social History Narrative    None       I have reviewed Nakia's allergies, medications, problem list, medical, social and family history and have updated as needed in the electronic medical record    Current Outpatient Medications   Medication Sig Dispense Refill    diclofenac sodium 1 % GEL Apply 2 g topically 2 times daily 1 Tube 5    blood glucose test strips (FREESTYLE LITE) strip Check blood sugar  strip 12    omeprazole (PRILOSEC) 20 MG delayed release capsule Take 1 capsule by mouth daily 30 capsule 5    metFORMIN (GLUCOPHAGE) 500 MG tablet Take 1 tablet by mouth 2 times daily (with meals) 180 tablet 1    phenylephrine-cocoa butter (PREPARATION H) 0.25-88.44 % Place 1 suppository rectally 2 times daily as needed for Hemorrhoids or Itching 20 suppository 1    melatonin 3 MG TABS tablet TAKE ONE TABLET BY MOUTH EVERY DAY 30 tablet 4  gabapentin (NEURONTIN) 300 MG capsule Take 300 mg by mouth 3 times daily. Lisa Wilson aspirin 81 MG tablet Take 81 mg by mouth daily      diclofenac sodium 1 % GEL Apply 2 g topically 2 times daily 1 Tube 5    Blood Glucose Monitoring Suppl (FREESTYLE LITE) VASU Check blood sugar BID 1 Device 0     No current facility-administered medications for this visit. Review Of Systems:    Review of Systems   Eyes: Negative for visual disturbance. Respiratory: Negative for shortness of breath. Cardiovascular: Negative for chest pain, palpitations and leg swelling. Gastrointestinal: Negative for diarrhea, nausea and vomiting. Genitourinary: Negative for difficulty urinating, dysuria and frequency. Skin: Negative for rash. Psychiatric/Behavioral: Negative for dysphoric mood. OBJECTIVE:     VS:  Wt Readings from Last 3 Encounters:   04/02/19 226 lb (102.5 kg)   01/02/19 226 lb (102.5 kg)   10/03/18 213 lb (96.6 kg)     Vitals:    04/02/19 0944   BP: 134/80   Pulse: 80   Resp: 20   SpO2: 98%       Physical Exam   Constitutional: He is oriented to person, place, and time. He appears well-developed and well-nourished. No distress. Neck: Neck supple. Carotid bruit is not present. Cardiovascular: Normal rate, regular rhythm and normal heart sounds. Exam reveals no gallop. No murmur heard. Pulmonary/Chest: Effort normal and breath sounds normal. He has no wheezes. He has no rales. Abdominal: Soft. Bowel sounds are normal. He exhibits no distension. There is no tenderness. Musculoskeletal: He exhibits no edema. Neurological: He is alert and oriented to person, place, and time. Skin: Skin is warm and dry. Psychiatric: He has a normal mood and affect. Vitals reviewed. Results for orders placed or performed in visit on 04/02/19   POCT glycosylated hemoglobin (Hb A1C)   Result Value Ref Range    Hemoglobin A1C 6.8 %         Nakia was seen today for diabetes.     Diagnoses and all orders for this visit:    Type 2 diabetes mellitus with diabetic polyneuropathy, without long-term current use of insulin ([facility])  -     POCT glycosylated hemoglobin (Hb A1C)  -     blood glucose test strips (FREESTYLE LITE) strip; Check blood sugar BID  -     Diabetic Foot Exam    Gastroesophageal reflux disease without esophagitis  -     omeprazole (PRILOSEC) 20 MG delayed release capsule; Take 1 capsule by mouth daily    Cervicalgia  -     diclofenac sodium 1 % GEL; Apply 2 g topically 2 times daily        BMI was elevated today, and weight loss plan recommended is : conventional weight loss. Phone/MyChart follow up if tests abnormal.    Return in about 6 months (around 10/2/2019) for diabetes. I have reviewed my findings and recommendations with Mikhail Willingham.     Yvonne Noel M.D

## 2019-04-02 NOTE — PATIENT INSTRUCTIONS
plate format. Talk to your doctor, a dietitian, or a diabetes educator about your concerns. Carbohydrate counting  With carbohydrate counting, you plan meals based on the amount of carbohydrate in each food. Carbohydrate raises blood sugar higher and more quickly than any other nutrient. It is found in desserts, breads and cereals, and fruit. It's also found in starchy vegetables such as potatoes and corn, grains such as rice and pasta, and milk and yogurt. Spreading carbohydrate throughout the day helps keep your blood sugar levels within your target range. Your daily amount depends on several things, including your weight, how active you are, which diabetes medicines you take, and what your goals are for your blood sugar levels. A registered dietitian or diabetes educator can help you plan how much carbohydrate to include in each meal and snack. A guideline for your daily amount of carbohydrate is:  · 45 to 60 grams at each meal. That's about the same as 3 to 4 carbohydrate servings. · 15 to 20 grams at each snack. That's about the same as 1 carbohydrate serving. The Nutrition Facts label on packaged foods tells you how much carbohydrate is in a serving of the food. First, look at the serving size on the food label. Is that the amount you eat in a serving? All of the nutrition information on a food label is based on that serving size. So if you eat more or less than that, you'll need to adjust the other numbers. Total carbohydrate is the next thing you need to look for on the label. If you count carbohydrate servings, one serving of carbohydrate is 15 grams. For foods that don't come with labels, such as fresh fruits and vegetables, you'll need a guide that lists carbohydrate in these foods. Ask your doctor, dietitian, or diabetes educator about books or other nutrition guides you can use.   If you take insulin, you need to know how many grams of carbohydrate are in a meal. This lets you know how much Version: 11.9  © 3582-4292 [employer]. Care instructions adapted under license by [facility] ([facility]). If you have questions about a medical condition or this instruction, always ask your healthcare professional. [address] any warranty or liability for your use of this information.

## 2019-04-11 ENCOUNTER — HOSPITAL ENCOUNTER (EMERGENCY)
Age: 64
Discharge: HOME | End: 2019-04-11
Payer: COMMERCIAL

## 2019-04-11 VITALS
RESPIRATION RATE: 20 BRPM | BODY MASS INDEX: 30.65 KG/M2 | SYSTOLIC BLOOD PRESSURE: 130 MMHG | TEMPERATURE: 97.8 F | HEART RATE: 61 BPM | WEIGHT: 226 LBS | DIASTOLIC BLOOD PRESSURE: 76 MMHG | OXYGEN SATURATION: 93 %

## 2019-04-11 DIAGNOSIS — T07.XXXA ABRASIONS OF MULTIPLE SITES: ICD-10-CM

## 2019-04-11 DIAGNOSIS — L03.011 PARONYCHIA OF FINGER, RIGHT: Primary | ICD-10-CM

## 2019-04-11 PROCEDURE — 99213 OFFICE O/P EST LOW 20 MIN: CPT

## 2019-04-11 PROCEDURE — 26010 DRAINAGE OF FINGER ABSCESS: CPT

## 2019-04-11 RX ORDER — DOXYCYCLINE 100 MG/1
100 CAPSULE ORAL 2 TIMES DAILY
Qty: 20 CAPSULE | Refills: 0 | Status: SHIPPED | OUTPATIENT
Start: 2019-04-11 | End: 2019-04-21

## 2019-04-11 NOTE — ED PROVIDER NOTES
This is a 59year old male that presents to urgent care with a complaint of right middle finger swelling, tenderness and redness around the side of the nail for the past couple of days. No injury. Review of Systems   Constitutional:        Pertinent positives and negatives are stated within HPI, all other systems reviewed and are negative. Physical Exam   Constitutional: He is oriented to person, place, and time. He appears well-developed and well-nourished. HENT:   Head: Normocephalic and atraumatic. Right Ear: Hearing and external ear normal.   Left Ear: Hearing and external ear normal.   Nose: Nose normal. Right sinus exhibits no maxillary sinus tenderness and no frontal sinus tenderness. Left sinus exhibits no maxillary sinus tenderness and no frontal sinus tenderness. Mouth/Throat: Uvula is midline, oropharynx is clear and moist and mucous membranes are normal. No trismus in the jaw. No uvula swelling. Eyes: Pupils are equal, round, and reactive to light. Conjunctivae, EOM and lids are normal.   Neck: Normal range of motion. Neck supple. Cardiovascular: Normal rate, regular rhythm and normal heart sounds. No murmur heard. Pulmonary/Chest: Effort normal and breath sounds normal.   Abdominal: Soft. Bowel sounds are normal. There is no tenderness. There is no rigidity, no rebound, no guarding and no CVA tenderness. Musculoskeletal: He exhibits no edema. Neurological: He is alert and oriented to person, place, and time. He has normal strength. No cranial nerve deficit or sensory deficit. Coordination and gait normal. GCS eye subscore is 4. GCS verbal subscore is 5. GCS motor subscore is 6. Skin: Skin is warm and dry. No abrasion and no rash noted. Area on paronychia type abscess R middle finger side of nail. About 4 mm diameter and raised. Nursing note and vitals reviewed.       Incision/Drainage  Performed by: Everette Hyde PA-C  Authorized by: Everette Hyde PA-C Consent:     Consent obtained:  Verbal    Consent given by:  Patient    Risks discussed:  Bleeding    Alternatives discussed:  No treatment  Location:     Type:  Abscess    Location: right middle finger. Pre-procedure details:     Skin preparation:  Betadine  Anesthesia (see MAR for exact dosages): Anesthesia method:  Topical application (anesthetic spay)  Procedure type:     Complexity:  Simple  Procedure details:     Incision types:  Stab incision    Incision depth:  Dermal    Drainage:  Purulent    Drainage amount: Moderate    Packing materials:  None  Post-procedure details:     Patient tolerance of procedure: Tolerated well, no immediate complications        MDM         --------------------------------------------- PAST HISTORY ---------------------------------------------  Past Medical History:  has a past medical history of BPH (benign prostatic hyperplasia), Chronic back pain, DDD (degenerative disc disease), cervical, Depression, Fibromyalgia, GERD (gastroesophageal reflux disease), Headache, Hepatitis C, Irritable bowel syndrome, Joint pain, Memory difficulties, Neck pain, Neuropathy of foot, Osteoarthritis, and Type II or unspecified type diabetes mellitus without mention of complication, not stated as uncontrolled. Past Surgical History:  has a past surgical history that includes Abscess Drainage; Foot surgery (1998); Mouth surgery; Endoscopy, colon, diagnostic; Colonoscopy; Varicose vein surgery; and Knee arthroscopy (Left, 11/04/2016). Social History:  reports that he has never smoked. He has never used smokeless tobacco. He reports that he does not drink alcohol or use drugs. Family History: family history is not on file. The patients home medications have been reviewed. Allergies: Celebrex [celecoxib];  Lipitor [atorvastatin]; and Nexium [esomeprazole magnesium trihydrate]    -------------------------------------------------- RESULTS -------------------------------------------------  No results found for this visit on 04/11/19. No orders to display       ------------------------- NURSING NOTES AND VITALS REVIEWED ---------------------------   The nursing notes within the ED encounter and vital signs as below have been reviewed. /76   Pulse 61   Temp 97.8 °F (36.6 °C) (Oral)   Resp 20   Wt 226 lb (102.5 kg)   SpO2 93%   BMI 30.65 kg/m²   Oxygen Saturation Interpretation: Normal      ------------------------------------------ PROGRESS NOTES ------------------------------------------   I have spoken with the patient and discussed todays results, in addition to providing specific details for the plan of care and counseling regarding the diagnosis and prognosis. Their questions are answered at this time and they are agreeable with the plan.      --------------------------------- ADDITIONAL PROVIDER NOTES ---------------------------------     This patient is stable for discharge. I have shared the specific conditions for return, as well as the importance of follow-up. * NOTE: This report was transcribed using voice recognition software. Every effort was made to ensure accuracy; however, inadvertent computerized transcription errors may be present.    --------------------------------- IMPRESSION AND DISPOSITION ---------------------------------    IMPRESSION  1.  Paronychia of finger, right        DISPOSITION  Disposition: Discharge to home  Patient condition is good            Esperanza Padilla PA-C  04/11/19 0885

## 2019-04-18 ENCOUNTER — TELEPHONE (OUTPATIENT)
Dept: FAMILY MEDICINE CLINIC | Age: 64
End: 2019-04-18

## 2019-04-18 NOTE — TELEPHONE ENCOUNTER
Called pt advised him to call his insurance company and ask what diabetic machine is covered and call me back

## 2019-04-23 RX ORDER — LANOLIN ALCOHOL/MO/W.PET/CERES
CREAM (GRAM) TOPICAL
Qty: 30 TABLET | Refills: 3 | Status: SHIPPED | OUTPATIENT
Start: 2019-04-23

## 2019-06-20 ENCOUNTER — HOSPITAL ENCOUNTER (EMERGENCY)
Age: 64
Discharge: HOME | End: 2019-06-20
Payer: COMMERCIAL

## 2019-06-20 VITALS
OXYGEN SATURATION: 94 % | TEMPERATURE: 98 F | SYSTOLIC BLOOD PRESSURE: 169 MMHG | DIASTOLIC BLOOD PRESSURE: 81 MMHG | RESPIRATION RATE: 18 BRPM | WEIGHT: 226 LBS | HEIGHT: 72 IN | HEART RATE: 63 BPM | BODY MASS INDEX: 30.61 KG/M2

## 2019-06-20 DIAGNOSIS — H61.22 IMPACTED CERUMEN OF LEFT EAR: ICD-10-CM

## 2019-06-20 DIAGNOSIS — H92.02 OTALGIA OF LEFT EAR: Primary | ICD-10-CM

## 2019-06-20 PROCEDURE — 99282 EMERGENCY DEPT VISIT SF MDM: CPT

## 2019-06-20 RX ORDER — TRAMADOL HYDROCHLORIDE 50 MG/1
50 TABLET ORAL EVERY 6 HOURS PRN
Qty: 8 TABLET | Refills: 0 | Status: SHIPPED | OUTPATIENT
Start: 2019-06-20 | End: 2019-06-25

## 2019-06-20 ASSESSMENT — PAIN DESCRIPTION - LOCATION: LOCATION: EAR

## 2019-06-20 ASSESSMENT — PAIN DESCRIPTION - PAIN TYPE: TYPE: ACUTE PAIN

## 2019-06-20 ASSESSMENT — PAIN DESCRIPTION - ORIENTATION: ORIENTATION: RIGHT;LEFT

## 2019-06-20 ASSESSMENT — PAIN SCALES - GENERAL: PAINLEVEL_OUTOF10: 6

## 2019-06-20 ASSESSMENT — PAIN DESCRIPTION - DESCRIPTORS: DESCRIPTORS: PRESSURE

## 2019-06-20 NOTE — ED TRIAGE NOTES
The patient has an appointment with the [address] [facility] in July, which was the earliest appointment.

## 2019-06-21 ENCOUNTER — CARE COORDINATION (OUTPATIENT)
Dept: CARE COORDINATION | Age: 64
End: 2019-06-21

## 2019-06-21 NOTE — CARE COORDINATION
Ambulatory Care Coordination ED Follow up Call  Left voice message for patient at [phone] (M)(H) identified myself RN with [address], requested patient please return the call Monday. Provided contact information.      Reason for ED Visit:  Ear Fullness  Diagnosis:  Otalgia of left ear, impacted cerumen of left ear  Care Management Risk Score:  5

## 2019-09-01 DIAGNOSIS — M54.2 CERVICALGIA: ICD-10-CM

## 2019-10-02 ENCOUNTER — OFFICE VISIT (OUTPATIENT)
Dept: FAMILY MEDICINE CLINIC | Age: 64
End: 2019-10-02
Payer: COMMERCIAL

## 2019-10-02 VITALS
BODY MASS INDEX: 30.07 KG/M2 | RESPIRATION RATE: 20 BRPM | DIASTOLIC BLOOD PRESSURE: 78 MMHG | HEIGHT: 72 IN | OXYGEN SATURATION: 97 % | WEIGHT: 222 LBS | TEMPERATURE: 97.3 F | SYSTOLIC BLOOD PRESSURE: 120 MMHG | HEART RATE: 100 BPM

## 2019-10-02 DIAGNOSIS — M54.2 CERVICALGIA: ICD-10-CM

## 2019-10-02 DIAGNOSIS — E11.42 TYPE 2 DIABETES MELLITUS WITH DIABETIC POLYNEUROPATHY, WITHOUT LONG-TERM CURRENT USE OF INSULIN (HCC): Primary | ICD-10-CM

## 2019-10-02 DIAGNOSIS — N13.8 BPH WITH URINARY OBSTRUCTION: ICD-10-CM

## 2019-10-02 DIAGNOSIS — N40.1 BPH WITH URINARY OBSTRUCTION: ICD-10-CM

## 2019-10-02 LAB
CREATININE URINE POCT: NORMAL
HBA1C MFR BLD: 7 %
MICROALBUMIN/CREAT 24H UR: NORMAL MG/G{CREAT}
MICROALBUMIN/CREAT UR-RTO: NORMAL

## 2019-10-02 PROCEDURE — 3017F COLORECTAL CA SCREEN DOC REV: CPT | Performed by: FAMILY MEDICINE

## 2019-10-02 PROCEDURE — G8427 DOCREV CUR MEDS BY ELIG CLIN: HCPCS | Performed by: FAMILY MEDICINE

## 2019-10-02 PROCEDURE — 99214 OFFICE O/P EST MOD 30 MIN: CPT | Performed by: FAMILY MEDICINE

## 2019-10-02 PROCEDURE — 2022F DILAT RTA XM EVC RTNOPTHY: CPT | Performed by: FAMILY MEDICINE

## 2019-10-02 PROCEDURE — G8417 CALC BMI ABV UP PARAM F/U: HCPCS | Performed by: FAMILY MEDICINE

## 2019-10-02 PROCEDURE — 82044 UR ALBUMIN SEMIQUANTITATIVE: CPT | Performed by: FAMILY MEDICINE

## 2019-10-02 PROCEDURE — 3045F PR MOST RECENT HEMOGLOBIN A1C LEVEL 7.0-9.0%: CPT | Performed by: FAMILY MEDICINE

## 2019-10-02 PROCEDURE — 1036F TOBACCO NON-USER: CPT | Performed by: FAMILY MEDICINE

## 2019-10-02 PROCEDURE — G8484 FLU IMMUNIZE NO ADMIN: HCPCS | Performed by: FAMILY MEDICINE

## 2019-10-02 PROCEDURE — 83036 HEMOGLOBIN GLYCOSYLATED A1C: CPT | Performed by: FAMILY MEDICINE

## 2019-10-02 RX ORDER — FINASTERIDE 5 MG/1
5 TABLET, FILM COATED ORAL DAILY
Qty: 30 TABLET | Refills: 3 | Status: SHIPPED | OUTPATIENT
Start: 2019-10-02 | End: 2020-01-07

## 2019-10-02 ASSESSMENT — ENCOUNTER SYMPTOMS
COLOR CHANGE: 1
VOMITING: 0
NAUSEA: 0
SHORTNESS OF BREATH: 0
DIARRHEA: 0

## 2019-10-02 ASSESSMENT — PATIENT HEALTH QUESTIONNAIRE - PHQ9
SUM OF ALL RESPONSES TO PHQ QUESTIONS 1-9: 0
SUM OF ALL RESPONSES TO PHQ9 QUESTIONS 1 & 2: 0
1. LITTLE INTEREST OR PLEASURE IN DOING THINGS: 0
SUM OF ALL RESPONSES TO PHQ QUESTIONS 1-9: 0
2. FEELING DOWN, DEPRESSED OR HOPELESS: 0

## 2019-10-04 ENCOUNTER — EVALUATION (OUTPATIENT)
Dept: PHYSICAL THERAPY | Age: 64
End: 2019-10-04
Payer: COMMERCIAL

## 2019-10-04 DIAGNOSIS — M54.2 CERVICALGIA: Primary | ICD-10-CM

## 2019-10-04 PROCEDURE — 97162 PT EVAL MOD COMPLEX 30 MIN: CPT | Performed by: PHYSICAL THERAPIST

## 2019-10-04 PROCEDURE — G0283 ELEC STIM OTHER THAN WOUND: HCPCS | Performed by: PHYSICAL THERAPIST

## 2019-10-08 ENCOUNTER — TREATMENT (OUTPATIENT)
Dept: PHYSICAL THERAPY | Age: 64
End: 2019-10-08
Payer: COMMERCIAL

## 2019-10-08 DIAGNOSIS — M54.2 CERVICALGIA: Primary | ICD-10-CM

## 2019-10-08 PROCEDURE — G0283 ELEC STIM OTHER THAN WOUND: HCPCS | Performed by: PHYSICAL THERAPIST

## 2019-10-08 PROCEDURE — 97530 THERAPEUTIC ACTIVITIES: CPT | Performed by: PHYSICAL THERAPIST

## 2019-10-08 PROCEDURE — 97110 THERAPEUTIC EXERCISES: CPT | Performed by: PHYSICAL THERAPIST

## 2019-10-10 ENCOUNTER — TREATMENT (OUTPATIENT)
Dept: PHYSICAL THERAPY | Age: 64
End: 2019-10-10
Payer: COMMERCIAL

## 2019-10-10 DIAGNOSIS — M54.2 CERVICALGIA: Primary | ICD-10-CM

## 2019-10-10 PROCEDURE — 97110 THERAPEUTIC EXERCISES: CPT | Performed by: PHYSICAL THERAPIST

## 2019-10-10 PROCEDURE — G0283 ELEC STIM OTHER THAN WOUND: HCPCS | Performed by: PHYSICAL THERAPIST

## 2019-10-10 PROCEDURE — 97530 THERAPEUTIC ACTIVITIES: CPT | Performed by: PHYSICAL THERAPIST

## 2019-10-16 ENCOUNTER — TELEPHONE (OUTPATIENT)
Dept: PHYSICAL THERAPY | Age: 64
End: 2019-10-16

## 2019-10-18 ENCOUNTER — TREATMENT (OUTPATIENT)
Dept: PHYSICAL THERAPY | Age: 64
End: 2019-10-18
Payer: COMMERCIAL

## 2019-10-18 DIAGNOSIS — M54.2 CERVICALGIA: Primary | ICD-10-CM

## 2019-10-18 PROCEDURE — 97110 THERAPEUTIC EXERCISES: CPT | Performed by: PHYSICAL THERAPIST

## 2019-10-18 PROCEDURE — 97530 THERAPEUTIC ACTIVITIES: CPT | Performed by: PHYSICAL THERAPIST

## 2019-10-18 PROCEDURE — G0283 ELEC STIM OTHER THAN WOUND: HCPCS | Performed by: PHYSICAL THERAPIST

## 2019-10-25 ENCOUNTER — EVALUATION (OUTPATIENT)
Dept: PHYSICAL THERAPY | Age: 64
End: 2019-10-25
Payer: COMMERCIAL

## 2019-10-25 DIAGNOSIS — G62.9 NEUROPATHY: ICD-10-CM

## 2019-10-25 DIAGNOSIS — M25.572 BILATERAL ANKLE PAIN, UNSPECIFIED CHRONICITY: Primary | ICD-10-CM

## 2019-10-25 DIAGNOSIS — I73.9 PVD (PERIPHERAL VASCULAR DISEASE) (HCC): ICD-10-CM

## 2019-10-25 DIAGNOSIS — M25.571 BILATERAL ANKLE PAIN, UNSPECIFIED CHRONICITY: Primary | ICD-10-CM

## 2019-10-25 PROCEDURE — G0283 ELEC STIM OTHER THAN WOUND: HCPCS | Performed by: PHYSICAL THERAPIST

## 2019-10-25 PROCEDURE — 97162 PT EVAL MOD COMPLEX 30 MIN: CPT | Performed by: PHYSICAL THERAPIST

## 2019-10-30 ENCOUNTER — TREATMENT (OUTPATIENT)
Dept: PHYSICAL THERAPY | Age: 64
End: 2019-10-30
Payer: COMMERCIAL

## 2019-10-30 DIAGNOSIS — M25.571 BILATERAL ANKLE PAIN, UNSPECIFIED CHRONICITY: Primary | ICD-10-CM

## 2019-10-30 DIAGNOSIS — I73.9 PVD (PERIPHERAL VASCULAR DISEASE) (HCC): ICD-10-CM

## 2019-10-30 DIAGNOSIS — M25.572 BILATERAL ANKLE PAIN, UNSPECIFIED CHRONICITY: Primary | ICD-10-CM

## 2019-10-30 DIAGNOSIS — G62.9 NEUROPATHY: ICD-10-CM

## 2019-10-30 PROCEDURE — 97110 THERAPEUTIC EXERCISES: CPT

## 2019-10-30 PROCEDURE — G0283 ELEC STIM OTHER THAN WOUND: HCPCS

## 2019-11-01 ENCOUNTER — TREATMENT (OUTPATIENT)
Dept: PHYSICAL THERAPY | Age: 64
End: 2019-11-01
Payer: COMMERCIAL

## 2019-11-01 DIAGNOSIS — M25.571 BILATERAL ANKLE PAIN, UNSPECIFIED CHRONICITY: Primary | ICD-10-CM

## 2019-11-01 DIAGNOSIS — G62.9 NEUROPATHY: ICD-10-CM

## 2019-11-01 DIAGNOSIS — M25.572 BILATERAL ANKLE PAIN, UNSPECIFIED CHRONICITY: Primary | ICD-10-CM

## 2019-11-01 DIAGNOSIS — I73.9 PVD (PERIPHERAL VASCULAR DISEASE) (HCC): ICD-10-CM

## 2019-11-01 PROCEDURE — G0283 ELEC STIM OTHER THAN WOUND: HCPCS

## 2019-11-01 PROCEDURE — 97110 THERAPEUTIC EXERCISES: CPT

## 2019-11-06 ENCOUNTER — TREATMENT (OUTPATIENT)
Dept: PHYSICAL THERAPY | Age: 64
End: 2019-11-06
Payer: COMMERCIAL

## 2019-11-06 DIAGNOSIS — M25.571 BILATERAL ANKLE PAIN, UNSPECIFIED CHRONICITY: Primary | ICD-10-CM

## 2019-11-06 DIAGNOSIS — G62.9 NEUROPATHY: ICD-10-CM

## 2019-11-06 DIAGNOSIS — I73.9 PVD (PERIPHERAL VASCULAR DISEASE) (HCC): ICD-10-CM

## 2019-11-06 DIAGNOSIS — M25.572 BILATERAL ANKLE PAIN, UNSPECIFIED CHRONICITY: Primary | ICD-10-CM

## 2019-11-06 PROCEDURE — 97110 THERAPEUTIC EXERCISES: CPT | Performed by: PHYSICAL THERAPIST

## 2019-11-06 PROCEDURE — G0283 ELEC STIM OTHER THAN WOUND: HCPCS | Performed by: PHYSICAL THERAPIST

## 2019-11-08 ENCOUNTER — TELEPHONE (OUTPATIENT)
Dept: PHYSICAL THERAPY | Age: 64
End: 2019-11-08

## 2019-11-15 ENCOUNTER — TREATMENT (OUTPATIENT)
Dept: PHYSICAL THERAPY | Age: 64
End: 2019-11-15
Payer: COMMERCIAL

## 2019-11-15 DIAGNOSIS — M25.572 BILATERAL ANKLE PAIN, UNSPECIFIED CHRONICITY: Primary | ICD-10-CM

## 2019-11-15 DIAGNOSIS — M25.571 BILATERAL ANKLE PAIN, UNSPECIFIED CHRONICITY: Primary | ICD-10-CM

## 2019-11-15 PROCEDURE — G0283 ELEC STIM OTHER THAN WOUND: HCPCS | Performed by: PHYSICAL THERAPIST

## 2019-11-15 PROCEDURE — 97110 THERAPEUTIC EXERCISES: CPT | Performed by: PHYSICAL THERAPIST

## 2019-11-18 ENCOUNTER — TREATMENT (OUTPATIENT)
Dept: PHYSICAL THERAPY | Age: 64
End: 2019-11-18

## 2019-11-18 ENCOUNTER — HOSPITAL ENCOUNTER (EMERGENCY)
Age: 64
Discharge: HOME | End: 2019-11-18
Payer: COMMERCIAL

## 2019-11-18 VITALS
SYSTOLIC BLOOD PRESSURE: 157 MMHG | RESPIRATION RATE: 18 BRPM | DIASTOLIC BLOOD PRESSURE: 72 MMHG | TEMPERATURE: 97.9 F | OXYGEN SATURATION: 95 % | BODY MASS INDEX: 30.48 KG/M2 | HEART RATE: 66 BPM | WEIGHT: 225 LBS | HEIGHT: 72 IN

## 2019-11-18 DIAGNOSIS — M25.572 BILATERAL ANKLE PAIN, UNSPECIFIED CHRONICITY: Primary | ICD-10-CM

## 2019-11-18 DIAGNOSIS — L85.3 DRY SKIN DERMATITIS: Primary | ICD-10-CM

## 2019-11-18 DIAGNOSIS — G62.9 NEUROPATHY: ICD-10-CM

## 2019-11-18 DIAGNOSIS — M25.571 BILATERAL ANKLE PAIN, UNSPECIFIED CHRONICITY: Primary | ICD-10-CM

## 2019-11-18 DIAGNOSIS — I73.9 PVD (PERIPHERAL VASCULAR DISEASE) (HCC): ICD-10-CM

## 2019-11-18 PROCEDURE — 99282 EMERGENCY DEPT VISIT SF MDM: CPT

## 2019-11-18 PROCEDURE — 99024 POSTOP FOLLOW-UP VISIT: CPT | Performed by: PHYSICAL THERAPIST

## 2019-11-18 PROCEDURE — 6370000000 HC RX 637 (ALT 250 FOR IP): Performed by: NURSE PRACTITIONER

## 2019-11-18 RX ORDER — LIDOCAINE AND PRILOCAINE 25; 25 MG/G; MG/G
CREAM TOPICAL ONCE
Status: COMPLETED | OUTPATIENT
Start: 2019-11-18 | End: 2019-11-18

## 2019-11-18 RX ADMIN — LIDOCAINE AND PRILOCAINE: 25; 25 CREAM TOPICAL at 16:09

## 2019-11-18 ASSESSMENT — PAIN SCALES - GENERAL: PAINLEVEL_OUTOF10: 5

## 2019-11-18 ASSESSMENT — PAIN DESCRIPTION - ORIENTATION: ORIENTATION: LEFT;RIGHT

## 2019-11-18 ASSESSMENT — PAIN DESCRIPTION - LOCATION: LOCATION: FOOT

## 2019-11-18 ASSESSMENT — PAIN DESCRIPTION - PAIN TYPE: TYPE: ACUTE PAIN

## 2019-12-06 ENCOUNTER — HOSPITAL ENCOUNTER (OUTPATIENT)
Age: 64
Discharge: HOME | End: 2019-12-08
Payer: COMMERCIAL

## 2019-12-06 DIAGNOSIS — E11.42 TYPE 2 DIABETES MELLITUS WITH DIABETIC POLYNEUROPATHY, WITHOUT LONG-TERM CURRENT USE OF INSULIN (HCC): ICD-10-CM

## 2019-12-06 LAB
ALBUMIN SERPL-MCNC: 4.3 G/DL (ref 3.5–5.2)
ALP BLD-CCNC: 85 U/L (ref 40–129)
ALT SERPL-CCNC: 69 U/L (ref 0–40)
ANION GAP SERPL CALCULATED.3IONS-SCNC: 12 MMOL/L (ref 7–16)
AST SERPL-CCNC: 46 U/L (ref 0–39)
BILIRUB SERPL-MCNC: 0.6 MG/DL (ref 0–1.2)
BUN BLDV-MCNC: 19 MG/DL (ref 8–23)
CALCIUM SERPL-MCNC: 9.9 MG/DL (ref 8.6–10.2)
CHLORIDE BLD-SCNC: 98 MMOL/L (ref 98–107)
CHOLESTEROL, TOTAL: 164 MG/DL (ref 0–199)
CO2: 28 MMOL/L (ref 22–29)
CREAT SERPL-MCNC: 1.1 MG/DL (ref 0.7–1.2)
GFR AFRICAN AMERICAN: >60
GFR NON-AFRICAN AMERICAN: >60 ML/MIN/1.73
GLUCOSE BLD-MCNC: 203 MG/DL (ref 74–99)
HCT VFR BLD CALC: 48.7 % (ref 37–54)
HDLC SERPL-MCNC: 35 MG/DL
HEMOGLOBIN: 15.8 G/DL (ref 12.5–16.5)
LDL CHOLESTEROL CALCULATED: 99 MG/DL (ref 0–99)
MCH RBC QN AUTO: 31.3 PG (ref 26–35)
MCHC RBC AUTO-ENTMCNC: 32.4 % (ref 32–34.5)
MCV RBC AUTO: 96.4 FL (ref 80–99.9)
PDW BLD-RTO: 11.7 FL (ref 11.5–15)
PLATELET # BLD: 242 E9/L (ref 130–450)
PMV BLD AUTO: 11 FL (ref 7–12)
POTASSIUM SERPL-SCNC: 4.5 MMOL/L (ref 3.5–5)
RBC # BLD: 5.05 E12/L (ref 3.8–5.8)
SODIUM BLD-SCNC: 138 MMOL/L (ref 132–146)
TOTAL PROTEIN: 7.4 G/DL (ref 6.4–8.3)
TRIGL SERPL-MCNC: 151 MG/DL (ref 0–149)
VLDLC SERPL CALC-MCNC: 30 MG/DL
WBC # BLD: 6.8 E9/L (ref 4.5–11.5)

## 2019-12-06 PROCEDURE — 80061 LIPID PANEL: CPT

## 2019-12-06 PROCEDURE — 85027 COMPLETE CBC AUTOMATED: CPT

## 2019-12-06 PROCEDURE — 80053 COMPREHEN METABOLIC PANEL: CPT

## 2019-12-06 PROCEDURE — 36415 COLL VENOUS BLD VENIPUNCTURE: CPT

## 2020-01-07 ENCOUNTER — OFFICE VISIT (OUTPATIENT)
Dept: FAMILY MEDICINE CLINIC | Age: 65
End: 2020-01-07
Payer: COMMERCIAL

## 2020-01-07 VITALS
OXYGEN SATURATION: 96 % | WEIGHT: 224 LBS | BODY MASS INDEX: 30.34 KG/M2 | HEART RATE: 73 BPM | SYSTOLIC BLOOD PRESSURE: 122 MMHG | HEIGHT: 72 IN | DIASTOLIC BLOOD PRESSURE: 82 MMHG

## 2020-01-07 LAB — HBA1C MFR BLD: 7.4 %

## 2020-01-07 PROCEDURE — 83036 HEMOGLOBIN GLYCOSYLATED A1C: CPT | Performed by: FAMILY MEDICINE

## 2020-01-07 PROCEDURE — G8427 DOCREV CUR MEDS BY ELIG CLIN: HCPCS | Performed by: FAMILY MEDICINE

## 2020-01-07 PROCEDURE — 1036F TOBACCO NON-USER: CPT | Performed by: FAMILY MEDICINE

## 2020-01-07 PROCEDURE — 99214 OFFICE O/P EST MOD 30 MIN: CPT | Performed by: FAMILY MEDICINE

## 2020-01-07 PROCEDURE — G8417 CALC BMI ABV UP PARAM F/U: HCPCS | Performed by: FAMILY MEDICINE

## 2020-01-07 PROCEDURE — 2022F DILAT RTA XM EVC RTNOPTHY: CPT | Performed by: FAMILY MEDICINE

## 2020-01-07 PROCEDURE — 3017F COLORECTAL CA SCREEN DOC REV: CPT | Performed by: FAMILY MEDICINE

## 2020-01-07 PROCEDURE — G8484 FLU IMMUNIZE NO ADMIN: HCPCS | Performed by: FAMILY MEDICINE

## 2020-01-07 RX ORDER — BLOOD-GLUCOSE METER
KIT MISCELLANEOUS
Qty: 1 DEVICE | Refills: 0 | Status: SHIPPED
Start: 2020-01-07 | End: 2021-04-14 | Stop reason: CLARIF

## 2020-01-07 ASSESSMENT — PATIENT HEALTH QUESTIONNAIRE - PHQ9
SUM OF ALL RESPONSES TO PHQ QUESTIONS 1-9: 2
SUM OF ALL RESPONSES TO PHQ9 QUESTIONS 1 & 2: 2
1. LITTLE INTEREST OR PLEASURE IN DOING THINGS: 1
SUM OF ALL RESPONSES TO PHQ QUESTIONS 1-9: 2
2. FEELING DOWN, DEPRESSED OR HOPELESS: 1

## 2020-01-07 ASSESSMENT — ENCOUNTER SYMPTOMS
SHORTNESS OF BREATH: 1
ROS SKIN COMMENTS: +DRY SKIN
NAUSEA: 0
VOMITING: 0
DIARRHEA: 0

## 2020-01-07 NOTE — PATIENT INSTRUCTIONS

## 2020-03-10 ENCOUNTER — OFFICE VISIT (OUTPATIENT)
Dept: FAMILY MEDICINE CLINIC | Age: 65
End: 2020-03-10
Payer: COMMERCIAL

## 2020-03-10 VITALS
HEART RATE: 60 BPM | OXYGEN SATURATION: 98 % | SYSTOLIC BLOOD PRESSURE: 128 MMHG | HEIGHT: 72 IN | DIASTOLIC BLOOD PRESSURE: 72 MMHG | RESPIRATION RATE: 20 BRPM | BODY MASS INDEX: 30.34 KG/M2 | WEIGHT: 224 LBS

## 2020-03-10 PROBLEM — I73.9 PVD (PERIPHERAL VASCULAR DISEASE) (HCC): Status: RESOLVED | Noted: 2019-10-25 | Resolved: 2020-03-10

## 2020-03-10 PROBLEM — F33.1 MODERATE EPISODE OF RECURRENT MAJOR DEPRESSIVE DISORDER (HCC): Status: RESOLVED | Noted: 2018-07-30 | Resolved: 2020-03-10

## 2020-03-10 LAB — HBA1C MFR BLD: 8 %

## 2020-03-10 PROCEDURE — G8484 FLU IMMUNIZE NO ADMIN: HCPCS | Performed by: FAMILY MEDICINE

## 2020-03-10 PROCEDURE — 83036 HEMOGLOBIN GLYCOSYLATED A1C: CPT | Performed by: FAMILY MEDICINE

## 2020-03-10 PROCEDURE — G8427 DOCREV CUR MEDS BY ELIG CLIN: HCPCS | Performed by: FAMILY MEDICINE

## 2020-03-10 PROCEDURE — 3052F HG A1C>EQUAL 8.0%<EQUAL 9.0%: CPT | Performed by: FAMILY MEDICINE

## 2020-03-10 PROCEDURE — 3017F COLORECTAL CA SCREEN DOC REV: CPT | Performed by: FAMILY MEDICINE

## 2020-03-10 PROCEDURE — 1036F TOBACCO NON-USER: CPT | Performed by: FAMILY MEDICINE

## 2020-03-10 PROCEDURE — G8417 CALC BMI ABV UP PARAM F/U: HCPCS | Performed by: FAMILY MEDICINE

## 2020-03-10 PROCEDURE — 99214 OFFICE O/P EST MOD 30 MIN: CPT | Performed by: FAMILY MEDICINE

## 2020-03-10 PROCEDURE — 2022F DILAT RTA XM EVC RTNOPTHY: CPT | Performed by: FAMILY MEDICINE

## 2020-03-10 ASSESSMENT — PATIENT HEALTH QUESTIONNAIRE - PHQ9
SUM OF ALL RESPONSES TO PHQ9 QUESTIONS 1 & 2: 0
2. FEELING DOWN, DEPRESSED OR HOPELESS: 0
SUM OF ALL RESPONSES TO PHQ QUESTIONS 1-9: 0
SUM OF ALL RESPONSES TO PHQ QUESTIONS 1-9: 0
1. LITTLE INTEREST OR PLEASURE IN DOING THINGS: 0

## 2020-03-10 ASSESSMENT — ENCOUNTER SYMPTOMS
BLOOD IN STOOL: 0
VOMITING: 0
NAUSEA: 0
DIARRHEA: 0
SHORTNESS OF BREATH: 0

## 2020-03-10 NOTE — PROGRESS NOTES
without long-term current use of insulin (HCC)  -     POCT glycosylated hemoglobin (Hb A1C)  -     metFORMIN (GLUCOPHAGE) 1000 MG tablet; Take 1 tablet by mouth 2 times daily (with meals)  -      DIABETES FOOT EXAM    Gastroesophageal reflux disease without esophagitis        -     Continue omeprazole    Chronic hepatitis C without hepatic coma ([facility]        -     S/p treatment by GI        -     Stable; will assess at least yearly      BMI was elevated today, and weight loss plan recommended is : conventional weight loss. Phone/MyChart follow up if tests abnormal.    Return in about 2 months (around 5/10/2020) for diabetes. I have reviewed my findings and recommendations with Fide Bae.     Ed Frederick M.D

## 2020-05-14 ENCOUNTER — OFFICE VISIT (OUTPATIENT)
Dept: FAMILY MEDICINE CLINIC | Age: 65
End: 2020-05-14
Payer: COMMERCIAL

## 2020-05-14 VITALS
RESPIRATION RATE: 20 BRPM | HEIGHT: 72 IN | HEART RATE: 65 BPM | BODY MASS INDEX: 30.61 KG/M2 | WEIGHT: 226 LBS | SYSTOLIC BLOOD PRESSURE: 124 MMHG | OXYGEN SATURATION: 98 % | DIASTOLIC BLOOD PRESSURE: 82 MMHG

## 2020-05-14 LAB — HBA1C MFR BLD: 8 %

## 2020-05-14 PROCEDURE — 99214 OFFICE O/P EST MOD 30 MIN: CPT | Performed by: FAMILY MEDICINE

## 2020-05-14 PROCEDURE — G8427 DOCREV CUR MEDS BY ELIG CLIN: HCPCS | Performed by: FAMILY MEDICINE

## 2020-05-14 PROCEDURE — 3017F COLORECTAL CA SCREEN DOC REV: CPT | Performed by: FAMILY MEDICINE

## 2020-05-14 PROCEDURE — 3052F HG A1C>EQUAL 8.0%<EQUAL 9.0%: CPT | Performed by: FAMILY MEDICINE

## 2020-05-14 PROCEDURE — 1036F TOBACCO NON-USER: CPT | Performed by: FAMILY MEDICINE

## 2020-05-14 PROCEDURE — G8417 CALC BMI ABV UP PARAM F/U: HCPCS | Performed by: FAMILY MEDICINE

## 2020-05-14 PROCEDURE — 2022F DILAT RTA XM EVC RTNOPTHY: CPT | Performed by: FAMILY MEDICINE

## 2020-05-14 PROCEDURE — 83036 HEMOGLOBIN GLYCOSYLATED A1C: CPT | Performed by: FAMILY MEDICINE

## 2020-05-14 RX ORDER — GLIPIZIDE AND METFORMIN HCL 5; 500 MG/1; MG/1
1 TABLET, FILM COATED ORAL
Qty: 60 TABLET | Refills: 3 | Status: SHIPPED
Start: 2020-05-14 | End: 2020-07-22 | Stop reason: SDUPTHER

## 2020-05-14 RX ORDER — OMEPRAZOLE 20 MG/1
20 CAPSULE, DELAYED RELEASE ORAL DAILY
Qty: 30 CAPSULE | Refills: 3 | Status: SHIPPED
Start: 2020-05-14 | End: 2020-07-22

## 2020-05-14 ASSESSMENT — ENCOUNTER SYMPTOMS
VOMITING: 0
NAUSEA: 0
DIARRHEA: 1
SHORTNESS OF BREATH: 0

## 2020-05-14 NOTE — PATIENT INSTRUCTIONS

## 2020-05-14 NOTE — PROGRESS NOTES
sugar  strip 12    gabapentin (NEURONTIN) 300 MG capsule Take 300 mg by mouth 3 times daily. Ange Wilson aspirin 81 MG tablet Take 81 mg by mouth daily       No current facility-administered medications for this visit. Review Of Systems:    Review of Systems   Eyes: Negative for visual disturbance. Respiratory: Negative for shortness of breath. Cardiovascular: Negative for chest pain, palpitations and leg swelling. Gastrointestinal: Positive for diarrhea (due to metformin). Negative for nausea and vomiting. Genitourinary: Negative for difficulty urinating, dysuria and frequency. Skin: Negative for rash. OBJECTIVE:     VS:  Wt Readings from Last 3 Encounters:   05/14/20 226 lb (102.5 kg)   03/10/20 224 lb (101.6 kg)   01/07/20 224 lb (101.6 kg)     Vitals:    05/14/20 1315   BP: 124/82   Pulse: 65   Resp: 20   SpO2: 98%       Physical Exam  Vitals signs reviewed. Constitutional:       General: He is not in acute distress. Appearance: He is well-developed. Neck:      Musculoskeletal: Neck supple. Vascular: No carotid bruit. Cardiovascular:      Rate and Rhythm: Normal rate and regular rhythm. Heart sounds: Normal heart sounds. No murmur. No gallop. Pulmonary:      Effort: Pulmonary effort is normal.      Breath sounds: Normal breath sounds. No wheezing or rales. Abdominal:      General: Bowel sounds are normal. There is no distension. Palpations: Abdomen is soft. Tenderness: There is no abdominal tenderness. Skin:     General: Skin is warm and dry. Neurological:      Mental Status: He is alert and oriented to person, place, and time. Results for orders placed or performed in visit on 05/14/20   POCT glycosylated hemoglobin (Hb A1C)   Result Value Ref Range    Hemoglobin A1C 8.0 %         Nakia was seen today for diabetes.     Diagnoses and all orders for this visit:    Uncontrolled diabetes mellitus with diabetic neuropathy ([facility] [address]  - POCT glycosylated hemoglobin (Hb A1C)  -     Resume glipiZIDE-metformin (METAGLIP) 5-500 MG per tablet; Take 1 tablet by mouth 2 times daily (before meals)        -     Increase physical activity    Gastroesophageal reflux disease without esophagitis  -     omeprazole (PRILOSEC) 20 MG delayed release capsule; Take 1 capsule by mouth Daily        BMI was elevated today, and weight loss plan recommended is : conventional weight loss. Phone/MyChart follow up if tests abnormal.    Return in about 2 months (around 7/14/2020) for diabetes. I have reviewed my findings and recommendations with Sundar Corrales.     Ramsey Bass M.D

## 2020-05-15 PROBLEM — G62.9 NEUROPATHY: Status: RESOLVED | Noted: 2019-10-25 | Resolved: 2020-05-15

## 2020-05-23 ENCOUNTER — APPOINTMENT (OUTPATIENT)
Dept: GENERAL RADIOLOGY | Age: 65
End: 2020-05-23
Payer: COMMERCIAL

## 2020-05-23 ENCOUNTER — HOSPITAL ENCOUNTER (EMERGENCY)
Age: 65
Discharge: HOME | End: 2020-05-23
Payer: COMMERCIAL

## 2020-05-23 VITALS
DIASTOLIC BLOOD PRESSURE: 87 MMHG | TEMPERATURE: 98.2 F | RESPIRATION RATE: 18 BRPM | HEART RATE: 59 BPM | OXYGEN SATURATION: 97 % | SYSTOLIC BLOOD PRESSURE: 148 MMHG | BODY MASS INDEX: 30.65 KG/M2 | WEIGHT: 226 LBS

## 2020-05-23 PROCEDURE — 99283 EMERGENCY DEPT VISIT LOW MDM: CPT

## 2020-05-23 PROCEDURE — 72100 X-RAY EXAM L-S SPINE 2/3 VWS: CPT

## 2020-05-23 PROCEDURE — 96372 THER/PROPH/DIAG INJ SC/IM: CPT

## 2020-05-23 PROCEDURE — 73502 X-RAY EXAM HIP UNI 2-3 VIEWS: CPT

## 2020-05-23 PROCEDURE — 6360000002 HC RX W HCPCS: Performed by: PHYSICIAN ASSISTANT

## 2020-05-23 RX ORDER — KETOROLAC TROMETHAMINE 30 MG/ML
30 INJECTION, SOLUTION INTRAMUSCULAR; INTRAVENOUS ONCE
Status: COMPLETED | OUTPATIENT
Start: 2020-05-23 | End: 2020-05-23

## 2020-05-23 RX ORDER — IBUPROFEN 600 MG/1
600 TABLET ORAL EVERY 6 HOURS PRN
Qty: 40 TABLET | Refills: 0 | Status: SHIPPED | OUTPATIENT
Start: 2020-05-23 | End: 2020-10-13

## 2020-05-23 RX ORDER — DEXAMETHASONE SODIUM PHOSPHATE 10 MG/ML
10 INJECTION INTRAMUSCULAR; INTRAVENOUS ONCE
Status: COMPLETED | OUTPATIENT
Start: 2020-05-23 | End: 2020-05-23

## 2020-05-23 RX ORDER — HYDROCODONE BITARTRATE AND ACETAMINOPHEN 5; 325 MG/1; MG/1
1 TABLET ORAL EVERY 6 HOURS PRN
Qty: 12 TABLET | Refills: 0 | Status: SHIPPED | OUTPATIENT
Start: 2020-05-23 | End: 2020-05-26

## 2020-05-23 RX ADMIN — KETOROLAC TROMETHAMINE 30 MG: 30 INJECTION, SOLUTION INTRAMUSCULAR at 12:24

## 2020-05-23 RX ADMIN — DEXAMETHASONE SODIUM PHOSPHATE 10 MG: 10 INJECTION INTRAMUSCULAR; INTRAVENOUS at 12:25

## 2020-05-23 ASSESSMENT — PAIN SCALES - GENERAL
PAINLEVEL_OUTOF10: 9
PAINLEVEL_OUTOF10: 9

## 2020-05-24 ENCOUNTER — HOSPITAL ENCOUNTER (EMERGENCY)
Age: 65
Discharge: HOME | End: 2020-05-24
Attending: EMERGENCY MEDICINE
Payer: COMMERCIAL

## 2020-05-24 VITALS
RESPIRATION RATE: 18 BRPM | BODY MASS INDEX: 30.52 KG/M2 | TEMPERATURE: 98 F | SYSTOLIC BLOOD PRESSURE: 171 MMHG | HEART RATE: 57 BPM | OXYGEN SATURATION: 97 % | WEIGHT: 225 LBS | DIASTOLIC BLOOD PRESSURE: 96 MMHG

## 2020-05-24 PROCEDURE — 99283 EMERGENCY DEPT VISIT LOW MDM: CPT

## 2020-05-24 PROCEDURE — 6360000002 HC RX W HCPCS: Performed by: STUDENT IN AN ORGANIZED HEALTH CARE EDUCATION/TRAINING PROGRAM

## 2020-05-24 PROCEDURE — 6370000000 HC RX 637 (ALT 250 FOR IP): Performed by: STUDENT IN AN ORGANIZED HEALTH CARE EDUCATION/TRAINING PROGRAM

## 2020-05-24 PROCEDURE — 96374 THER/PROPH/DIAG INJ IV PUSH: CPT

## 2020-05-24 PROCEDURE — 96372 THER/PROPH/DIAG INJ SC/IM: CPT

## 2020-05-24 RX ORDER — DOCUSATE SODIUM 100 MG/1
100 CAPSULE, LIQUID FILLED ORAL 2 TIMES DAILY
Qty: 20 CAPSULE | Refills: 0 | Status: SHIPPED | OUTPATIENT
Start: 2020-05-24 | End: 2020-06-03

## 2020-05-24 RX ORDER — ORPHENADRINE CITRATE 30 MG/ML
60 INJECTION INTRAMUSCULAR; INTRAVENOUS ONCE
Status: COMPLETED | OUTPATIENT
Start: 2020-05-24 | End: 2020-05-24

## 2020-05-24 RX ORDER — HYDROCODONE BITARTRATE AND ACETAMINOPHEN 5; 325 MG/1; MG/1
1 TABLET ORAL ONCE
Status: COMPLETED | OUTPATIENT
Start: 2020-05-24 | End: 2020-05-24

## 2020-05-24 RX ORDER — DIAZEPAM 5 MG/ML
5 INJECTION, SOLUTION INTRAMUSCULAR; INTRAVENOUS ONCE
Status: COMPLETED | OUTPATIENT
Start: 2020-05-24 | End: 2020-05-24

## 2020-05-24 RX ORDER — DOCUSATE SODIUM 100 MG/1
100 CAPSULE, LIQUID FILLED ORAL ONCE
Status: COMPLETED | OUTPATIENT
Start: 2020-05-24 | End: 2020-05-24

## 2020-05-24 RX ORDER — KETOROLAC TROMETHAMINE 30 MG/ML
30 INJECTION, SOLUTION INTRAMUSCULAR; INTRAVENOUS ONCE
Status: COMPLETED | OUTPATIENT
Start: 2020-05-24 | End: 2020-05-24

## 2020-05-24 RX ADMIN — DOCUSATE SODIUM 100 MG: 100 CAPSULE, LIQUID FILLED ORAL at 23:13

## 2020-05-24 RX ADMIN — ORPHENADRINE CITRATE 60 MG: 30 INJECTION INTRAMUSCULAR; INTRAVENOUS at 21:06

## 2020-05-24 RX ADMIN — HYDROCODONE BITARTRATE AND ACETAMINOPHEN 1 TABLET: 5; 325 TABLET ORAL at 21:45

## 2020-05-24 RX ADMIN — KETOROLAC TROMETHAMINE 30 MG: 30 INJECTION, SOLUTION INTRAMUSCULAR at 21:06

## 2020-05-24 RX ADMIN — DIAZEPAM 5 MG: 5 INJECTION, SOLUTION INTRAMUSCULAR; INTRAVENOUS at 23:01

## 2020-05-24 ASSESSMENT — ENCOUNTER SYMPTOMS
EYE PAIN: 0
COUGH: 0
SINUS PRESSURE: 0
DIARRHEA: 0
EYE REDNESS: 0
WHEEZING: 0
BACK PAIN: 1
VOMITING: 0
NAUSEA: 0
EYE DISCHARGE: 0
ABDOMINAL PAIN: 0
SHORTNESS OF BREATH: 0
SORE THROAT: 0

## 2020-05-24 ASSESSMENT — PAIN DESCRIPTION - PAIN TYPE
TYPE: ACUTE PAIN;CHRONIC PAIN
TYPE: ACUTE PAIN
TYPE: ACUTE PAIN;CHRONIC PAIN

## 2020-05-24 ASSESSMENT — PAIN DESCRIPTION - LOCATION
LOCATION: BACK

## 2020-05-24 ASSESSMENT — PAIN SCALES - GENERAL
PAINLEVEL_OUTOF10: 8
PAINLEVEL_OUTOF10: 10
PAINLEVEL_OUTOF10: 10
PAINLEVEL_OUTOF10: 9

## 2020-05-24 ASSESSMENT — PAIN DESCRIPTION - PROGRESSION: CLINICAL_PROGRESSION: GRADUALLY IMPROVING

## 2020-05-25 NOTE — ED NOTES
Pt ambulated in cortes, tolerated well with some moderate pain, physician notified      Lisa Day, RN  05/24/20 8469

## 2020-05-25 NOTE — ED NOTES
Bed: 02  Expected date:   Expected time:   Means of arrival:   Comments:     Renny Alcaraz RN  05/24/20 2048

## 2020-07-22 ENCOUNTER — OFFICE VISIT (OUTPATIENT)
Dept: FAMILY MEDICINE CLINIC | Age: 65
End: 2020-07-22
Payer: COMMERCIAL

## 2020-07-22 VITALS
DIASTOLIC BLOOD PRESSURE: 80 MMHG | BODY MASS INDEX: 30.34 KG/M2 | HEIGHT: 72 IN | SYSTOLIC BLOOD PRESSURE: 134 MMHG | OXYGEN SATURATION: 96 % | HEART RATE: 59 BPM | RESPIRATION RATE: 20 BRPM | WEIGHT: 224 LBS

## 2020-07-22 LAB — HBA1C MFR BLD: 6.9 %

## 2020-07-22 PROCEDURE — 2022F DILAT RTA XM EVC RTNOPTHY: CPT | Performed by: FAMILY MEDICINE

## 2020-07-22 PROCEDURE — 1036F TOBACCO NON-USER: CPT | Performed by: FAMILY MEDICINE

## 2020-07-22 PROCEDURE — G8417 CALC BMI ABV UP PARAM F/U: HCPCS | Performed by: FAMILY MEDICINE

## 2020-07-22 PROCEDURE — 3044F HG A1C LEVEL LT 7.0%: CPT | Performed by: FAMILY MEDICINE

## 2020-07-22 PROCEDURE — 3017F COLORECTAL CA SCREEN DOC REV: CPT | Performed by: FAMILY MEDICINE

## 2020-07-22 PROCEDURE — G8427 DOCREV CUR MEDS BY ELIG CLIN: HCPCS | Performed by: FAMILY MEDICINE

## 2020-07-22 PROCEDURE — 99213 OFFICE O/P EST LOW 20 MIN: CPT | Performed by: FAMILY MEDICINE

## 2020-07-22 PROCEDURE — 83036 HEMOGLOBIN GLYCOSYLATED A1C: CPT | Performed by: FAMILY MEDICINE

## 2020-07-22 RX ORDER — GLIPIZIDE AND METFORMIN HCL 5; 500 MG/1; MG/1
1 TABLET, FILM COATED ORAL
Qty: 60 TABLET | Refills: 5 | Status: SHIPPED
Start: 2020-07-22 | End: 2021-01-14 | Stop reason: SDUPTHER

## 2020-07-22 ASSESSMENT — ENCOUNTER SYMPTOMS
VOMITING: 0
DIARRHEA: 0
SHORTNESS OF BREATH: 0
NAUSEA: 0

## 2020-07-22 NOTE — PROGRESS NOTES
OFFICE PROGRESS NOTE      SUBJECTIVE:        Patient ID:   Bladimir Sender is a 62 y.o. male who presents for   Chief Complaint   Patient presents with    Diabetes         HPI:   Patient is here to follow up on diabetes. Fasting blood sugars:110's Midday blood sugars: not checking. Patient checks blood glucose 1 times per day. Patient is following diabetic diet. Patient is a nonsmoker. Last ophthalmology visit: 1/2020. Patient is unable to tolerate statin. Patient having difficulty losing weight. Prior to Admission medications    Medication Sig Start Date End Date Taking? Authorizing Provider   glipiZIDE-metformin (METAGLIP) 5-500 MG per tablet Take 1 tablet by mouth 2 times daily (before meals) 7/22/20  Yes Jovany Peacock MD   diclofenac sodium (VOLTAREN) 1 % GEL Apply 2 g topically 2 times daily 7/22/20  Yes Jovany Peacock MD   mupirocin (BACTROBAN) 2 % ointment Apply topically 3 times daily. 7/22/20  Yes Jovany Peacock MD   ibuprofen (IBU) 600 MG tablet Take 1 tablet by mouth every 6 hours as needed for Pain 5/23/20  Yes Elvi Lo PA-C   diclofenac sodium 1 % GEL Apply 2 g topically 2 times daily 1/7/20  Yes Jovany Peacock MD   Blood Glucose Monitoring Suppl (FREESTYLE LITE) VASU Check blood sugar BID 1/7/20  Yes Jovany Peacock MD   melatonin 3 MG TABS tablet TAKE ONE TABLET BY MOUTH EVERY DAY 4/23/19  Yes Jovany Peacock MD   blood glucose test strips (FREESTYLE LITE) strip Check blood sugar BID 4/2/19  Yes Jovany Peacock MD   gabapentin (NEURONTIN) 300 MG capsule Take 300 mg by mouth 3 times daily. .   Yes Historical Provider, MD   aspirin 81 MG tablet Take 81 mg by mouth daily   Yes Historical Provider, MD     Social History     Socioeconomic History    Marital status:      Spouse name: None    Number of children: None    Years of education: None    Highest education level: None   Occupational History    None   Social Needs    Financial resource strain: None    Food insecurity     Worry: None     Inability: None    Transportation needs     Medical: None     Non-medical: None   Tobacco Use    Smoking status: Never Smoker    Smokeless tobacco: Never Used   Substance and Sexual Activity    Alcohol use: No    Drug use: No    Sexual activity: Yes     Partners: Female   Lifestyle    Physical activity     Days per week: None     Minutes per session: None    Stress: None   Relationships    Social connections     Talks on phone: None     Gets together: None     Attends Yarsani service: None     Active member of club or organization: None     Attends meetings of clubs or organizations: None     Relationship status: None    Intimate partner violence     Fear of current or ex partner: None     Emotionally abused: None     Physically abused: None     Forced sexual activity: None   Other Topics Concern    None   Social History Narrative    None       I have reviewed Nakia's allergies, medications, problem list, medical, social and family history and have updated as needed in the electronic medical record    Current Outpatient Medications   Medication Sig Dispense Refill    glipiZIDE-metformin (METAGLIP) 5-500 MG per tablet Take 1 tablet by mouth 2 times daily (before meals) 60 tablet 5    diclofenac sodium (VOLTAREN) 1 % GEL Apply 2 g topically 2 times daily 1 Tube 5    mupirocin (BACTROBAN) 2 % ointment Apply topically 3 times daily.  22 g 5    ibuprofen (IBU) 600 MG tablet Take 1 tablet by mouth every 6 hours as needed for Pain 40 tablet 0    diclofenac sodium 1 % GEL Apply 2 g topically 2 times daily 1 Tube 5    Blood Glucose Monitoring Suppl (FREESTYLE LITE) VASU Check blood sugar BID 1 Device 0    melatonin 3 MG TABS tablet TAKE ONE TABLET BY MOUTH EVERY DAY 30 tablet 3    blood glucose test strips (FREESTYLE LITE) strip Check blood sugar  strip 12    gabapentin (NEURONTIN) 300 MG capsule Take 300 mg by mouth 3 times daily. Kate Walters aspirin 81 MG tablet Take 81 mg by mouth daily       No current facility-administered medications for this visit. Review Of Systems:    Review of Systems   Eyes: Negative for visual disturbance. Respiratory: Negative for shortness of breath. Cardiovascular: Negative for chest pain, palpitations and leg swelling. Gastrointestinal: Negative for diarrhea, nausea and vomiting. Genitourinary: Negative for difficulty urinating, dysuria and frequency. Skin: Negative for rash. Psychiatric/Behavioral: Negative for dysphoric mood. OBJECTIVE:     VS:  Wt Readings from Last 3 Encounters:   07/22/20 224 lb (101.6 kg)   05/24/20 225 lb (102.1 kg)   05/23/20 226 lb (102.5 kg)     Vitals:    07/22/20 0951   BP: 134/80   Pulse: 59   Resp: 20   SpO2: 96%       Physical Exam  Vitals signs reviewed. Constitutional:       General: He is not in acute distress. Appearance: He is well-developed. Neck:      Musculoskeletal: Neck supple. Vascular: No carotid bruit. Cardiovascular:      Rate and Rhythm: Normal rate and regular rhythm. Heart sounds: Normal heart sounds. No murmur. No gallop. Pulmonary:      Effort: Pulmonary effort is normal.      Breath sounds: Normal breath sounds. No wheezing or rales. Abdominal:      General: Bowel sounds are normal. There is no distension. Palpations: Abdomen is soft. Tenderness: There is no abdominal tenderness. Skin:     General: Skin is warm and dry. Neurological:      Mental Status: He is alert and oriented to person, place, and time. Results for orders placed or performed in visit on 07/22/20   POCT glycosylated hemoglobin (Hb A1C)   Result Value Ref Range    Hemoglobin A1C 6.9 %         Nakia was seen today for diabetes.     Diagnoses and all orders for this visit:    Type 2 diabetes mellitus with diabetic polyneuropathy, without long-term current use of insulin ([facility]  -     POCT glycosylated hemoglobin (Hb A1C)  -     glipiZIDE-metformin (METAGLIP) 5-500 MG per tablet; Take 1 tablet by mouth 2 times daily (before meals)    Obesity (BMI 30-39.9)         -     BMI was elevated today, and weight loss plan recommended is : conventional weight loss. Phone/MyChart follow up if tests abnormal.    Return in about 3 months (around 10/22/2020) for diabetes. I have reviewed my findings and recommendations with Yrn Cruz.     Isamar Rodriguez M.D

## 2020-09-17 ENCOUNTER — TELEPHONE (OUTPATIENT)
Dept: FAMILY MEDICINE CLINIC | Age: 65
End: 2020-09-17

## 2020-10-13 ENCOUNTER — OFFICE VISIT (OUTPATIENT)
Dept: FAMILY MEDICINE CLINIC | Age: 65
End: 2020-10-13
Payer: COMMERCIAL

## 2020-10-13 ENCOUNTER — HOSPITAL ENCOUNTER (OUTPATIENT)
Age: 65
Discharge: HOME | End: 2020-10-15
Payer: COMMERCIAL

## 2020-10-13 VITALS
HEIGHT: 72 IN | DIASTOLIC BLOOD PRESSURE: 78 MMHG | OXYGEN SATURATION: 97 % | BODY MASS INDEX: 30.48 KG/M2 | RESPIRATION RATE: 20 BRPM | WEIGHT: 225 LBS | HEART RATE: 57 BPM | SYSTOLIC BLOOD PRESSURE: 124 MMHG

## 2020-10-13 LAB
HBA1C MFR BLD: 6.7 %
VITAMIN D 25-HYDROXY: 30 NG/ML (ref 30–100)

## 2020-10-13 PROCEDURE — 83036 HEMOGLOBIN GLYCOSYLATED A1C: CPT | Performed by: FAMILY MEDICINE

## 2020-10-13 PROCEDURE — 3017F COLORECTAL CA SCREEN DOC REV: CPT | Performed by: FAMILY MEDICINE

## 2020-10-13 PROCEDURE — 3044F HG A1C LEVEL LT 7.0%: CPT | Performed by: FAMILY MEDICINE

## 2020-10-13 PROCEDURE — 36415 COLL VENOUS BLD VENIPUNCTURE: CPT

## 2020-10-13 PROCEDURE — 1036F TOBACCO NON-USER: CPT | Performed by: FAMILY MEDICINE

## 2020-10-13 PROCEDURE — G8427 DOCREV CUR MEDS BY ELIG CLIN: HCPCS | Performed by: FAMILY MEDICINE

## 2020-10-13 PROCEDURE — 99214 OFFICE O/P EST MOD 30 MIN: CPT | Performed by: FAMILY MEDICINE

## 2020-10-13 PROCEDURE — 82306 VITAMIN D 25 HYDROXY: CPT

## 2020-10-13 PROCEDURE — G8417 CALC BMI ABV UP PARAM F/U: HCPCS | Performed by: FAMILY MEDICINE

## 2020-10-13 PROCEDURE — G8482 FLU IMMUNIZE ORDER/ADMIN: HCPCS | Performed by: FAMILY MEDICINE

## 2020-10-13 PROCEDURE — 2022F DILAT RTA XM EVC RTNOPTHY: CPT | Performed by: FAMILY MEDICINE

## 2020-10-13 RX ORDER — BLOOD-GLUCOSE METER
KIT MISCELLANEOUS
Qty: 100 STRIP | Refills: 12 | Status: SHIPPED
Start: 2020-10-13 | End: 2021-04-14 | Stop reason: CLARIF

## 2020-10-13 ASSESSMENT — ENCOUNTER SYMPTOMS
NAUSEA: 0
VOMITING: 0
SHORTNESS OF BREATH: 1
DIARRHEA: 0

## 2020-10-13 NOTE — PATIENT INSTRUCTIONS
Patient Education        Learning About Meal Planning for Diabetes  Why plan your meals? Meal planning can be a key part of managing diabetes. Planning meals and snacks with the right balance of carbohydrate, protein, and fat can help you keep your blood sugar at the target level you set with your doctor. You don't have to eat special foods. You can eat what your family eats, including sweets once in a while. But you do have to pay attention to how often you eat and how much you eat of certain foods. You may want to work with a dietitian or a certified diabetes educator. He or she can give you tips and meal ideas and can answer your questions about meal planning. This health professional can also help you reach a healthy weight if that is one of your goals. What plan is right for you? Your dietitian or diabetes educator may suggest that you start with the plate format or carbohydrate counting. The plate format  The plate format is a simple way to help you manage how you eat. You plan meals by learning how much space each food should take on a plate. Using the plate format helps you spread carbohydrate throughout the day. It can make it easier to keep your blood sugar level within your target range. It also helps you see if you're eating healthy portion sizes. To use the plate format, you put non-starchy vegetables on half your plate. Add meat or meat substitutes on one-quarter of the plate. Put a grain or starchy vegetable (such as brown rice or a potato) on the final quarter of the plate. You can add a small piece of fruit and some low-fat or fat-free milk or yogurt, depending on your carbohydrate goal for each meal.  Here are some tips for using the plate format:  · Make sure that you are not using an oversized plate. A 9-inch plate is best. Many restaurants use larger plates. · Get used to using the plate format at home. Then you can use it when you eat out.   · Write down your questions about using the plate format. Talk to your doctor, a dietitian, or a diabetes educator about your concerns. Carbohydrate counting  With carbohydrate counting, you plan meals based on the amount of carbohydrate in each food. Carbohydrate raises blood sugar higher and more quickly than any other nutrient. It is found in desserts, breads and cereals, and fruit. It's also found in starchy vegetables such as potatoes and corn, grains such as rice and pasta, and milk and yogurt. Spreading carbohydrate throughout the day helps keep your blood sugar levels within your target range. Your daily amount depends on several things, including your weight, how active you are, which diabetes medicines you take, and what your goals are for your blood sugar levels. A registered dietitian or diabetes educator can help you plan how much carbohydrate to include in each meal and snack. A guideline for your daily amount of carbohydrate is:  · 45 to 60 grams at each meal. That's about the same as 3 to 4 carbohydrate servings. · 15 to 20 grams at each snack. That's about the same as 1 carbohydrate serving. The Nutrition Facts label on packaged foods tells you how much carbohydrate is in a serving of the food. First, look at the serving size on the food label. Is that the amount you eat in a serving? All of the nutrition information on a food label is based on that serving size. So if you eat more or less than that, you'll need to adjust the other numbers. Total carbohydrate is the next thing you need to look for on the label. If you count carbohydrate servings, one serving of carbohydrate is 15 grams. For foods that don't come with labels, such as fresh fruits and vegetables, you'll need a guide that lists carbohydrate in these foods. Ask your doctor, dietitian, or diabetes educator about books or other nutrition guides you can use.   If you take insulin, you need to know how many grams of carbohydrate are in a meal. This lets you know how much 7159               PGGQDCQ Version: 12.6  © 6708-6801 [employer]. Care instructions adapted under license by [facility] ([facility]). If you have questions about a medical condition or this instruction, always ask your healthcare professional. [address] any warranty or liability for your use of this information.

## 2020-10-13 NOTE — PROGRESS NOTES
OFFICE PROGRESS NOTE      SUBJECTIVE:        Patient ID:   Jeremy Jenkins is a 62 y.o. male who presents for   Chief Complaint   Patient presents with    Diabetes         HPI:   Patient is here to follow up on diabetes. Fasting blood sugars:110's Midday blood sugars: 100-110. Patient checks blood glucose 1-2 times per day. Patient is following diabetic diet. Patient is a smoker/nonsmoker. Last ophthalmology visit: 1/2020. Patient is taking a daily statin. Patient has had bilateral leg pains and soreness. Started after doing new resistance workouts--goes 3 days per week. Patient states this started about 2 weeks ago. Patient has noticed some swelling in left ankle after prolonged walking also. Takes aspirin for pain. Patient doing well on current regimen for BPH. Prior to Admission medications    Medication Sig Start Date End Date Taking? Authorizing Provider   blood glucose test strips (FREESTYLE LITE) strip Check blood sugar BID 10/13/20  Yes Jeffrey Bhatt MD   glipiZIDE-metformin (METAGLIP) 5-500 MG per tablet Take 1 tablet by mouth 2 times daily (before meals) 7/22/20  Yes Jeffrey Bhatt MD   diclofenac sodium (VOLTAREN) 1 % GEL Apply 2 g topically 2 times daily 7/22/20  Yes Jeffrey Bhatt MD   mupirocin (BACTROBAN) 2 % ointment Apply topically 3 times daily. 7/22/20  Yes Jeffrey Bhatt MD   Blood Glucose Monitoring Suppl (FREESTYLE LITE) VASU Check blood sugar BID 1/7/20  Yes Jeffrey Bhatt MD   melatonin 3 MG TABS tablet TAKE ONE TABLET BY MOUTH EVERY DAY 4/23/19  Yes Jeffrey Bhatt MD   gabapentin (NEURONTIN) 300 MG capsule Take 300 mg by mouth 3 times daily. .   Yes Historical Provider, MD   aspirin 81 MG tablet Take 81 mg by mouth daily   Yes Historical Provider, MD     Social History     Socioeconomic History    Marital status:      Spouse name: None    Number of children: None    Years of education: None orders for this visit:    Type 2 diabetes mellitus with diabetic polyneuropathy, without long-term current use of insulin (HCC)  -     POCT glycosylated hemoglobin (Hb A1C)  -     blood glucose test strips (FREESTYLE LITE) strip; Check blood sugar BID  -     CBC; Future  -     Comprehensive Metabolic Panel; Future  -     Microalbumin / Creatinine Urine Ratio; Future  -     Lipid Panel; Future  -     TSH without Reflex; Future        -     Continue glipizide-metformin    Acute left ankle pain  -     XR ANKLE LEFT (MIN 3 VIEWS); Future    Pain of right lower extremity  -     XR ANKLE LEFT (MIN 3 VIEWS); Future  -     XR TIBIA FIBULA RIGHT (2 VIEWS); Future  -     Vitamin D 25 Hydroxy; Future    Pain of left lower extremity  -     XR TIBIA FIBULA LEFT (2 VIEWS)    BPH with urinary obstruction  -     Psa screening; Future        BMI was elevated today, and weight loss plan recommended is : conventional weight loss. Phone/MyChart follow up if tests abnormal.    Return in about 3 months (around 1/13/2021) for diabetes. I have reviewed my findings and recommendations with Deja Zavaleta.     Doretha Franco M.D

## 2020-12-08 DIAGNOSIS — N13.8 BPH WITH URINARY OBSTRUCTION: ICD-10-CM

## 2020-12-08 DIAGNOSIS — E11.42 TYPE 2 DIABETES MELLITUS WITH DIABETIC POLYNEUROPATHY, WITHOUT LONG-TERM CURRENT USE OF INSULIN (HCC): ICD-10-CM

## 2020-12-08 DIAGNOSIS — N40.1 BPH WITH URINARY OBSTRUCTION: ICD-10-CM

## 2020-12-08 LAB
ALBUMIN SERPL-MCNC: 4.4 G/DL (ref 3.5–5.2)
ALP BLD-CCNC: 71 U/L (ref 40–129)
ALT SERPL-CCNC: 66 U/L (ref 0–40)
ANION GAP SERPL CALCULATED.3IONS-SCNC: 8 MMOL/L (ref 7–16)
AST SERPL-CCNC: 39 U/L (ref 0–39)
BILIRUB SERPL-MCNC: 0.5 MG/DL (ref 0–1.2)
BUN BLDV-MCNC: 26 MG/DL (ref 8–23)
CALCIUM SERPL-MCNC: 9.7 MG/DL (ref 8.6–10.2)
CHLORIDE BLD-SCNC: 100 MMOL/L (ref 98–107)
CHOLESTEROL, TOTAL: 176 MG/DL (ref 0–199)
CO2: 29 MMOL/L (ref 22–29)
CREAT SERPL-MCNC: 1.3 MG/DL (ref 0.7–1.2)
CREATININE URINE: 292 MG/DL (ref 40–278)
GFR AFRICAN AMERICAN: >60
GFR NON-AFRICAN AMERICAN: 56 ML/MIN/1.73
GLUCOSE BLD-MCNC: 186 MG/DL (ref 74–99)
HCT VFR BLD CALC: 45.9 % (ref 37–54)
HDLC SERPL-MCNC: 37 MG/DL
HEMOGLOBIN: 14.7 G/DL (ref 12.5–16.5)
LDL CHOLESTEROL CALCULATED: 107 MG/DL (ref 0–99)
MCH RBC QN AUTO: 31.3 PG (ref 26–35)
MCHC RBC AUTO-ENTMCNC: 32 % (ref 32–34.5)
MCV RBC AUTO: 97.7 FL (ref 80–99.9)
MICROALBUMIN UR-MCNC: 15.3 MG/L
MICROALBUMIN/CREAT UR-RTO: 5.2 (ref 0–30)
PDW BLD-RTO: 12 FL (ref 11.5–15)
PLATELET # BLD: 248 E9/L (ref 130–450)
PMV BLD AUTO: 11.4 FL (ref 7–12)
POTASSIUM SERPL-SCNC: 4.6 MMOL/L (ref 3.5–5)
PROSTATE SPECIFIC ANTIGEN: 3.46 NG/ML (ref 0–4)
RBC # BLD: 4.7 E12/L (ref 3.8–5.8)
SODIUM BLD-SCNC: 137 MMOL/L (ref 132–146)
TOTAL PROTEIN: 7.5 G/DL (ref 6.4–8.3)
TRIGL SERPL-MCNC: 160 MG/DL (ref 0–149)
TSH SERPL DL<=0.05 MIU/L-ACNC: 2.28 UIU/ML (ref 0.27–4.2)
VLDLC SERPL CALC-MCNC: 32 MG/DL
WBC # BLD: 6.6 E9/L (ref 4.5–11.5)

## 2021-01-14 ENCOUNTER — OFFICE VISIT (OUTPATIENT)
Dept: FAMILY MEDICINE CLINIC | Age: 66
End: 2021-01-14
Payer: COMMERCIAL

## 2021-01-14 VITALS
DIASTOLIC BLOOD PRESSURE: 78 MMHG | SYSTOLIC BLOOD PRESSURE: 115 MMHG | HEIGHT: 72 IN | WEIGHT: 220 LBS | RESPIRATION RATE: 20 BRPM | BODY MASS INDEX: 29.8 KG/M2 | OXYGEN SATURATION: 96 % | HEART RATE: 73 BPM

## 2021-01-14 DIAGNOSIS — E11.42 TYPE 2 DIABETES MELLITUS WITH DIABETIC POLYNEUROPATHY, WITHOUT LONG-TERM CURRENT USE OF INSULIN (HCC): Primary | ICD-10-CM

## 2021-01-14 DIAGNOSIS — K21.9 GASTROESOPHAGEAL REFLUX DISEASE WITHOUT ESOPHAGITIS: ICD-10-CM

## 2021-01-14 DIAGNOSIS — B18.2 CHRONIC HEPATITIS C WITHOUT HEPATIC COMA (HCC): ICD-10-CM

## 2021-01-14 LAB — HBA1C MFR BLD: 6.7 %

## 2021-01-14 PROCEDURE — 2022F DILAT RTA XM EVC RTNOPTHY: CPT | Performed by: FAMILY MEDICINE

## 2021-01-14 PROCEDURE — 83036 HEMOGLOBIN GLYCOSYLATED A1C: CPT | Performed by: FAMILY MEDICINE

## 2021-01-14 PROCEDURE — 3044F HG A1C LEVEL LT 7.0%: CPT | Performed by: FAMILY MEDICINE

## 2021-01-14 PROCEDURE — G8482 FLU IMMUNIZE ORDER/ADMIN: HCPCS | Performed by: FAMILY MEDICINE

## 2021-01-14 PROCEDURE — 1036F TOBACCO NON-USER: CPT | Performed by: FAMILY MEDICINE

## 2021-01-14 PROCEDURE — G8417 CALC BMI ABV UP PARAM F/U: HCPCS | Performed by: FAMILY MEDICINE

## 2021-01-14 PROCEDURE — 99213 OFFICE O/P EST LOW 20 MIN: CPT | Performed by: FAMILY MEDICINE

## 2021-01-14 PROCEDURE — 3017F COLORECTAL CA SCREEN DOC REV: CPT | Performed by: FAMILY MEDICINE

## 2021-01-14 PROCEDURE — G8427 DOCREV CUR MEDS BY ELIG CLIN: HCPCS | Performed by: FAMILY MEDICINE

## 2021-01-14 RX ORDER — ASPIRIN 81 MG/1
TABLET ORAL
COMMUNITY
End: 2021-06-03 | Stop reason: ALTCHOICE

## 2021-01-14 RX ORDER — OMEPRAZOLE 20 MG/1
CAPSULE, DELAYED RELEASE ORAL
Qty: 30 CAPSULE | Refills: 5 | Status: SHIPPED
Start: 2021-01-14 | End: 2021-07-09

## 2021-01-14 RX ORDER — GLIPIZIDE AND METFORMIN HCL 5; 500 MG/1; MG/1
1 TABLET, FILM COATED ORAL
Qty: 60 TABLET | Refills: 5 | Status: SHIPPED
Start: 2021-01-14 | End: 2021-07-13 | Stop reason: SDUPTHER

## 2021-01-14 RX ORDER — PNV,CALCIUM 72/IRON/FOLIC ACID 27 MG-1 MG
TABLET ORAL
COMMUNITY
Start: 2020-12-25

## 2021-01-14 RX ORDER — OMEPRAZOLE 20 MG/1
CAPSULE, DELAYED RELEASE ORAL
COMMUNITY
End: 2021-01-14 | Stop reason: SDUPTHER

## 2021-01-14 ASSESSMENT — PATIENT HEALTH QUESTIONNAIRE - PHQ9
SUM OF ALL RESPONSES TO PHQ QUESTIONS 1-9: 0
2. FEELING DOWN, DEPRESSED OR HOPELESS: 0
SUM OF ALL RESPONSES TO PHQ QUESTIONS 1-9: 0

## 2021-01-14 ASSESSMENT — ENCOUNTER SYMPTOMS
DIARRHEA: 0
VOMITING: 0
NAUSEA: 0
SHORTNESS OF BREATH: 0

## 2021-01-14 NOTE — PROGRESS NOTES
OFFICE PROGRESS NOTE      SUBJECTIVE:        Patient ID:   Ousmane Womack is a 60 y.o. male whopresents for   Chief Complaint   Patient presents with    Diabetes           HPI:   Patient is here to follow up on diabetes. Fasting blood sugars:108-110 Midday blood sugars: 110's. Patient checks blood glucose 2 times per day. Patient is following diabetic diet. Patient is a nonsmoker. Last ophthalmology visit: 1/2020. Patient could not tolerate daily statin. Patient doing well on current regimen for GERD. Patient completed treatment for Hepatitis C. Doing well. Prior to Admission medications    Medication Sig Start Date End Date Taking? Authorizing Provider   Prenatal Vit-Fe Fumarate-FA (PREPLUS) 27-1 MG TABS TAKE ONE TABLET BY MOUTH EVERY DAY 12/25/20  Yes Historical Provider, MD   aspirin 81 MG EC tablet aspirin 81 mg tablet,delayed release   Yes Historical Provider, MD   glipiZIDE-metformin (METAGLIP) 5-500 MG per tablet Take 1 tablet by mouth 2 times daily (before meals) 1/14/21  Yes Shweta Mijares MD   diclofenac sodium (VOLTAREN) 1 % GEL APPLY 2 GRAMS TOPICALLY TWICE A DAY 1/14/21  Yes Shweta Mijares MD   omeprazole (PRILOSEC) 20 MG delayed release capsule omeprazole 20 mg capsule,delayed release 1/14/21  Yes Shweta Mijares MD   blood glucose test strips (FREESTYLE LITE) strip Check blood sugar BID 10/13/20  Yes Shweta Mijares MD   diclofenac sodium (VOLTAREN) 1 % GEL Apply 2 g topically 2 times daily 7/22/20  Yes Shweta Mijares MD   mupirocin (BACTROBAN) 2 % ointment Apply topically 3 times daily. 7/22/20  Yes Shweta Mijares MD   Blood Glucose Monitoring Suppl (FREESTYLE LITE) VASU Check blood sugar BID 1/7/20  Yes Shweta Mijares MD   melatonin 3 MG TABS tablet TAKE ONE TABLET BY MOUTH EVERY DAY 4/23/19  Yes Shweta Mijares MD   gabapentin (NEURONTIN) 300 MG capsule Take 300 mg by mouth 3 times daily. Maureen Bello Yes Historical Provider, MD   aspirin 81 MG tablet Take 81 mg by mouth daily   Yes Historical Provider, MD     Social History     Socioeconomic History    Marital status:      Spouse name: None    Number of children: None    Years of education: None    Highest education level: None   Occupational History    None   Social Needs    Financial resource strain: None    Food insecurity     Worry: None     Inability: None    Transportation needs     Medical: None     Non-medical: None   Tobacco Use    Smoking status: Never Smoker    Smokeless tobacco: Never Used   Substance and Sexual Activity    Alcohol use: No    Drug use: No    Sexual activity: Yes     Partners: Female   Lifestyle    Physical activity     Days per week: None     Minutes per session: None    Stress: None   Relationships    Social connections     Talks on phone: None     Gets together: None     Attends Holiness service: None     Active member of club or organization: None     Attends meetings of clubs or organizations: None     Relationship status: None    Intimate partner violence     Fear of current or ex partner: None     Emotionally abused: None     Physically abused: None     Forced sexual activity: None   Other Topics Concern    None   Social History Narrative    None       I have reviewed Nakia's allergies, medications, problem list, medical, social and family history and have updated as needed in the electronic medical record    Current Outpatient Medications   Medication Sig Dispense Refill    Prenatal Vit-Fe Fumarate-FA (PREPLUS) 27-1 MG TABS TAKE ONE TABLET BY MOUTH EVERY DAY      aspirin 81 MG EC tablet aspirin 81 mg tablet,delayed release      glipiZIDE-metformin (METAGLIP) 5-500 MG per tablet Take 1 tablet by mouth 2 times daily (before meals) 60 tablet 5    diclofenac sodium (VOLTAREN) 1 % GEL APPLY 2 GRAMS TOPICALLY TWICE A DAY 50 g 5    omeprazole (PRILOSEC) 20 MG delayed release capsule omeprazole 20 mg capsule,delayed release 30 capsule 5    blood glucose test strips (FREESTYLE LITE) strip Check blood sugar  strip 12    diclofenac sodium (VOLTAREN) 1 % GEL Apply 2 g topically 2 times daily 1 Tube 5    mupirocin (BACTROBAN) 2 % ointment Apply topically 3 times daily. 22 g 5    Blood Glucose Monitoring Suppl (FREESTYLE LITE) VASU Check blood sugar BID 1 Device 0    melatonin 3 MG TABS tablet TAKE ONE TABLET BY MOUTH EVERY DAY 30 tablet 3    gabapentin (NEURONTIN) 300 MG capsule Take 300 mg by mouth 3 times daily. Dionna Bailey aspirin 81 MG tablet Take 81 mg by mouth daily       No current facility-administered medications for this visit. Review Of Systems:    Review of Systems   Eyes: Negative for visual disturbance. Respiratory: Negative for shortness of breath. Cardiovascular: Negative for chest pain, palpitations and leg swelling. Gastrointestinal: Negative for diarrhea, nausea and vomiting. Genitourinary: Negative for difficulty urinating, dysuria and frequency. Skin: Negative for rash. OBJECTIVE:     VS:  Wt Readings from Last 3 Encounters:   01/14/21 220 lb (99.8 kg)   10/13/20 225 lb (102.1 kg)   07/22/20 224 lb (101.6 kg)     Vitals:    01/14/21 1302   BP: 115/78   Pulse: 73   Resp: 20   SpO2: 96%       Physical Exam  Vitals signs reviewed. Constitutional:       General: He is not in acute distress. Appearance: He is well-developed. Neck:      Musculoskeletal: Neck supple. Vascular: No carotid bruit. Cardiovascular:      Rate and Rhythm: Normal rate and regular rhythm. Heart sounds: Normal heart sounds. No murmur. No gallop. Pulmonary:      Effort: Pulmonary effort is normal.      Breath sounds: Normal breath sounds. No wheezing or rales. Abdominal:      General: Bowel sounds are normal. There is no distension. Palpations: Abdomen is soft. Tenderness: There is no abdominal tenderness. Musculoskeletal:      Right lower leg: No edema. Left lower leg: No edema. Skin:     General: Skin is warm and dry. Neurological:      Mental Status: He is alert and oriented to person, place, and time. Results for orders placed or performed in visit on 01/14/21   POCT glycosylated hemoglobin (Hb A1C)   Result Value Ref Range    Hemoglobin A1C 6.7 %         Nakia was seen today for diabetes. Diagnoses and all orders for this visit:    Type 2 diabetes mellitus with diabetic polyneuropathy, without long-term current use of insulin (HCC)  -     glipiZIDE-metformin (METAGLIP) 5-500 MG per tablet; Take 1 tablet by mouth 2 times daily (before meals)  -     POCT glycosylated hemoglobin (Hb A1C)        -     Stable; will assess in 6 months    Chronic hepatitis C without hepatic coma (HCC)        -     Stable; will assess yearly    Gastroesophageal reflux disease without esophagitis  -     omeprazole (PRILOSEC) 20 MG delayed release capsule; omeprazole 20 mg capsule,delayed release              Phone/MyChart follow up if tests abnormal.    Return in about 6 months (around 7/14/2021) for diabetes. I have reviewed my findings and recommendations with Babatunde William.     Paul Spears M.D

## 2021-01-14 NOTE — PATIENT INSTRUCTIONS
Patient Education        Diabetes Foot Health: Care Instructions  Your Care Instructions     When you have diabetes, your feet need extra care and attention. Diabetes can damage the nerve endings and blood vessels in your feet, making you less likely to notice when your feet are injured. Diabetes also limits your body's ability to fight infection and get blood to areas that need it. If you get a minor foot injury, it could become an ulcer or a serious infection. With good foot care, you can prevent most of these problems. Caring for your feet can be quick and easy. Most of the care can be done when you are bathing or getting ready for bed. Follow-up care is a key part of your treatment and safety. Be sure to make and go to all appointments, and call your doctor if you are having problems. It's also a good idea to know your test results and keep a list of the medicines you take. How can you care for yourself at home? · Keep your blood sugar close to normal by watching what and how much you eat, monitoring blood sugar, taking medicines if prescribed, and getting regular exercise. · Do not smoke. Smoking affects blood flow and can make foot problems worse. If you need help quitting, talk to your doctor about stop-smoking programs and medicines. These can increase your chances of quitting for good. · Eat a diet that is low in fats. High fat intake can cause fat to build up in your blood vessels and decrease blood flow. · Inspect your feet daily for blisters, cuts, cracks, or sores. If you cannot see well, use a mirror or have someone help you. · Take care of your feet:  ? Wash your feet every day. Use warm (not hot) water. Check the water temperature with your wrists or other part of your body, not your feet. ? Dry your feet well. Pat them dry. Do not rub the skin on your feet too hard. Dry well between your toes.  If the skin on your feet stays moist, bacteria or a fungus can grow, which can lead to infection. ? Keep your skin soft. Use moisturizing skin cream to keep the skin on your feet soft and prevent calluses and cracks. But do not put the cream between your toes, and stop using any cream that causes a rash. ? Clean underneath your toenails carefully. Do not use a sharp object to clean underneath your toenails. Use the blunt end of a nail file or other rounded tool. ? Trim and file your toenails straight across to prevent ingrown toenails. Use a nail clipper, not scissors. Use an emery board to smooth the edges. · Change socks daily. Socks without seams are best, because seams often rub the feet. You can find socks for people with diabetes from specialty catalogs. · Look inside your shoes every day for things like gravel or torn linings, which could cause blisters or sores. · Buy shoes that fit well:  ? Look for shoes that have plenty of space around the toes. This helps prevent bunions and blisters. ? Try on shoes while wearing the kind of socks you will usually wear with the shoes. ? Avoid plastic shoes. They may rub your feet and cause blisters. Good shoes should be made of materials that are flexible and breathable, such as leather or cloth. ? Break in new shoes slowly by wearing them for no more than an hour a day for several days. Take extra time to check your feet for red areas, blisters, or other problems after you wear new shoes. · Do not go barefoot. Do not wear sandals, and do not wear shoes with very thin soles. Thin soles are easy to puncture. They also do not protect your feet from hot pavement or cold weather. · Have your doctor check your feet during each visit. If you have a foot problem, see your doctor. Do not try to treat an early foot problem at home. Home remedies or treatments that you can buy without a prescription (such as corn removers) can be harmful. · Always get early treatment for foot problems.  A minor irritation can lead to a major problem if not properly cared for early. When should you call for help? Call your doctor now or seek immediate medical care if:    · You have a foot sore, an ulcer or break in the skin that is not healing after 4 days, bleeding corns or calluses, or an ingrown toenail.     · You have blue or black areas, which can mean bruising or blood flow problems.     · You have peeling skin or tiny blisters between your toes or cracking or oozing of the skin.     · You have a fever for more than 24 hours and a foot sore.     · You have new numbness or tingling in your feet that does not go away after you move your feet or change positions.     · You have unexplained or unusual swelling of the foot or ankle. Watch closely for changes in your health, and be sure to contact your doctor if:    · You cannot do proper foot care. Where can you learn more? Go to https://Jana [email or web address]. org and sign in to your [employer] account. Enter A739 in the [employer] box to learn more about \"Diabetes Foot Health: Care Instructions. \"     If you do not have an account, please click on the \"Sign Up Now\" link. Current as of: December 20, 2019               Content Version: 12.6  © 1358-6234 [employer]. Care instructions adapted under license by [facility] ([facility]). If you have questions about a medical condition or this instruction, always ask your healthcare professional. [place] [address] any warranty or liability for your use of this information.

## 2021-01-25 LAB — DIABETIC RETINOPATHY: NORMAL

## 2021-03-31 ENCOUNTER — TELEPHONE (OUTPATIENT)
Dept: FAMILY MEDICINE CLINIC | Age: 66
End: 2021-03-31

## 2021-03-31 DIAGNOSIS — E11.42 DIABETIC POLYNEUROPATHY ASSOCIATED WITH TYPE 2 DIABETES MELLITUS (HCC): Primary | ICD-10-CM

## 2021-03-31 NOTE — TELEPHONE ENCOUNTER
Pt was in would like to see a good neurologist  For  His feet they hurt him   he is seeing his foot Dr  Friday and does not believe that  can not take care of his needs for feet  ?

## 2021-04-14 RX ORDER — BLOOD-GLUCOSE METER
EACH MISCELLANEOUS
Qty: 1 KIT | Refills: 0 | Status: SHIPPED | OUTPATIENT
Start: 2021-04-14

## 2021-04-14 RX ORDER — LANCETS
EACH MISCELLANEOUS
Qty: 100 EACH | Refills: 12 | Status: SHIPPED | OUTPATIENT
Start: 2021-04-14

## 2021-04-16 ENCOUNTER — OFFICE VISIT (OUTPATIENT)
Dept: NEUROLOGY | Age: 66
End: 2021-04-16
Payer: COMMERCIAL

## 2021-04-16 VITALS
SYSTOLIC BLOOD PRESSURE: 142 MMHG | TEMPERATURE: 97.3 F | OXYGEN SATURATION: 96 % | HEIGHT: 72 IN | HEART RATE: 56 BPM | WEIGHT: 220 LBS | DIASTOLIC BLOOD PRESSURE: 74 MMHG | BODY MASS INDEX: 29.8 KG/M2

## 2021-04-16 DIAGNOSIS — G62.9 NEUROPATHY: Primary | ICD-10-CM

## 2021-04-16 PROCEDURE — G8427 DOCREV CUR MEDS BY ELIG CLIN: HCPCS | Performed by: NURSE PRACTITIONER

## 2021-04-16 PROCEDURE — 1036F TOBACCO NON-USER: CPT | Performed by: NURSE PRACTITIONER

## 2021-04-16 PROCEDURE — 99204 OFFICE O/P NEW MOD 45 MIN: CPT | Performed by: NURSE PRACTITIONER

## 2021-04-16 PROCEDURE — 3017F COLORECTAL CA SCREEN DOC REV: CPT | Performed by: NURSE PRACTITIONER

## 2021-04-16 PROCEDURE — G8417 CALC BMI ABV UP PARAM F/U: HCPCS | Performed by: NURSE PRACTITIONER

## 2021-04-16 NOTE — PROGRESS NOTES
[address] [facility]. Tesfaye Doyle M.D., F.A.C.P. Tracie Calvert, DNP, APRN, ACNS-BC  Naya Mino. Linda Hammer, MSN, APRN-FNP-C  Armen Kathleen, MSN, APRN-FNP-C  RACHELL Tejeda, PA-C  Marcy Meyer, MSN, APRN-FNP-C  [address] [place], [address] [address]  Phone: [phone]  Fax: [phone]       Jeramy Christianson is a 60 y.o. left handed male     Patient referred for BLE neuropathy    Past Medical History:     Past Medical History:   Diagnosis Date    BPH (benign prostatic hyperplasia)     Chronic back pain     DDD (degenerative disc disease), cervical     neck, back    Depression     Fibromyalgia     GERD (gastroesophageal reflux disease)     Headache     Hepatitis C     Irritable bowel syndrome     Joint pain     feet and ankle pain    Memory difficulties     Neck pain     Neuropathy of foot     Osteoarthritis     Type II or unspecified type diabetes mellitus without mention of complication, not stated as uncontrolled      No history of cardiac, lung or kidney disease. No history of strokes or seizures. No history of connective tissue disorders or cancers. No history of exposures to toxins or chemicals. History of hits to the head: played soccer used his head a lot     Past Surgical History:       Past Surgical History:   Procedure Laterality Date    ABSCESS DRAINAGE      left kidney    COLONOSCOPY      2012    ENDOSCOPY, COLON, DIAGNOSTIC      2012    FOOT SURGERY  1998    KNEE ARTHROSCOPY Left 11/04/2016    left knee arthroscopy medial menicestomy debridment synovectomy    MOUTH SURGERY      implants    VARICOSE VEIN SURGERY       Allergies:       Celebrex [celecoxib], Lipitor [atorvastatin], and Nexium [esomeprazole magnesium trihydrate]    Medications:     Prior to Admission medications    Medication Sig Start Date End Date Taking?  Authorizing Provider   ONE TOUCH ULTRASOFT LANCETS MISC Check blood sugar BID 4/14/21   [facility] MD Tez   Blood Glucose Monitoring Suppl (ONE TOUCH ULTRA 2) w/Device KIT Check blood sugar BID 4/14/21   Juan Carlos Stark MD   blood glucose test strips (ASCENSIA AUTODISC VI;ONE TOUCH ULTRA TEST VI) strip Check blood sugar BID 4/14/21   Juan Carlos Stark MD   Prenatal Vit-Fe Fumarate-FA (PREPLUS) 27-1 MG TABS TAKE ONE TABLET BY MOUTH EVERY DAY 12/25/20   Historical Provider, MD   aspirin 81 MG EC tablet aspirin 81 mg tablet,delayed release    Historical Provider, MD   glipiZIDE-metformin (METAGLIP) 5-500 MG per tablet Take 1 tablet by mouth 2 times daily (before meals) 1/14/21   Juan Carlos Stark MD   diclofenac sodium (VOLTAREN) 1 % GEL APPLY 2 GRAMS TOPICALLY TWICE A DAY 1/14/21   Juan Carlos Stark MD   omeprazole (PRILOSEC) 20 MG delayed release capsule omeprazole 20 mg capsule,delayed release 1/14/21   Juan Carlos Stark MD   diclofenac sodium (VOLTAREN) 1 % GEL Apply 2 g topically 2 times daily 7/22/20   Juan Carlos Stark MD   mupirocin (BACTROBAN) 2 % ointment Apply topically 3 times daily. 7/22/20   Juan Carlos Stark MD   melatonin 3 MG TABS tablet TAKE ONE TABLET BY MOUTH EVERY DAY 4/23/19   Juan Carlos Stark MD   gabapentin (NEURONTIN) 300 MG capsule Take 300 mg by mouth 3 times daily. Madan Stevens Historical Provider, MD   aspirin 81 MG tablet Take 81 mg by mouth daily    Historical Provider, MD     Social History:       He reports that he has never smoked. He has never used smokeless tobacco. He reports that he does not drink alcohol or use drugs.     Review of Systems:     No issues with chewing or swallowing  No chest pain or palpitations  No SOB  No vertigo, lightheadedness or loss of consciousness  No falls, tripping or stumbling  No incontinence of bowels or bladder  No itching or bruising appreciated  + numbness and tingling of BLE   No focal arm/leg weakness    ROS is otherwise negative    Family History:     No family history on file. History of Present Illness:     Patient referred for further evaluation of neuropathy in his BLE. Onset was a month ago and it started out with his left foot and now both feet. He has feeling of numbness and pain intermittently throughout the day. He has sharp shooting pains in his calves at times. It feels like walking on cardboard. It is aggravated by activity. He has adjusted his diet and no longer eats red meat as he thought this would benefit and alleviate the symptoms. It is relieved by rest but it can still be painful even while resting. His foot doctor prescribed gabapentin which works periodically. He is not currently on gabapentin. He was also given a steroid shot in his left foot which relieved the pain for 3-4 days but the pain came back and stayed. He has a history of left metatarsal release > 10 years ago. He walks and workouts out 3 days a week. He was diagnosed in 2006 with diabetes and started taking diabetes medications in 2008. Objective:     Vitals:    04/16/21 1130   BP: (!) 142/74   Site: Left Upper Arm   Pulse: 56   Temp: 97.3 °F (36.3 °C)   SpO2: 96%   Weight: 220 lb (99.8 kg)   Height: 6' (1.829 m)     General appearance: alert, appears stated age, cooperative and in no distress  Head: normocephalic, without obvious abnormality, atraumatic  Eyes: conjunctivae/corneas clear; no drainage  Neck: no adenopathy, no carotid bruit, supple, symmetrical, trachea midline and thyroid not enlarged, no tenderness/mass/nodules  Back: symmetric, no curvature.  ROM normal.   Lungs: clear to auscultation bilaterally  Heart: regular rate and rhythm, S1, S2 normal, no murmur  Abdomen: soft, non-tender; bowel sounds normal; no masses, no organomegaly  Extremities: normal, atraumatic, no cyanosis or edema  Skin:  color, texture, turgor normal--no rashes or lesions      Mental Status: alert and oriented x 4    Appropriate attention/concentration  Intact fundus of knowledge  Repetition intact  Memories intact    Speech: no dysarthria  Language: no aphasias---reading, writing, repetition, and object identification intact    Cranial Nerves:  I: smell    II: visual acuity     II: visual fields Full    II: pupils ANNAMARIA   III,VII: ptosis None   III,IV,VI: extraocular muscles  EOMI without nystagmus   V: mastication Normal   V: facial light touch sensation  Normal   V,VII: corneal reflex     VII: facial muscle function - upper  Normal   VII: facial muscle function - lower Normal   VIII: hearing Normal   IX: soft palate elevation  Normal   IX,X: gag reflex    XI: trapezius strength  5/5   XI: sternocleidomastoid strength 5/5   XI: neck extension strength  5/5   XII: tongue strength  Normal     Motor:  5/5 throughout  Normal bulk and tone  No drift   No abnormal movements    Sensory:  LT and PP normal except decreased at feet   Vibration normal decrease at ankles bilaterally     Coordination:   FN, FFM and XAVIER normal  HS normal    Gait:  Normal  Romberg's negative  Walks well on toes, heels, and tandem    DTR:   2+ throughout     No Babinskis    No other pathological reflexes    Laboratory/Radiology:  ry/Radiology:     CBC with Differential:    Lab Results   Component Value Date    WBC 6.6 12/08/2020    RBC 4.70 12/08/2020    HGB 14.7 12/08/2020    HCT 45.9 12/08/2020     12/08/2020    MCV 97.7 12/08/2020    MCH 31.3 12/08/2020    MCHC 32.0 12/08/2020    RDW 12.0 12/08/2020    SEGSPCT 69 06/21/2011    LYMPHOPCT 31.3 10/05/2018    MONOPCT 7.4 10/05/2018    BASOPCT 0.8 10/05/2018    MONOSABS 0.44 10/05/2018    LYMPHSABS 1.85 10/05/2018    EOSABS 0.19 10/05/2018    BASOSABS 0.05 10/05/2018     CMP:    Lab Results   Component Value Date     12/08/2020    K 4.6 12/08/2020     12/08/2020    CO2 29 12/08/2020    BUN 26 12/08/2020    CREATININE 1.3 12/08/2020    GFRAA >60 12/08/2020    LABGLOM 56 12/08/2020    GLUCOSE 186 12/08/2020    GLUCOSE 110 06/21/2011    PROT 7.5 12/08/2020    LABALBU 4.4 12/08/2020    LABALBU 4.4 02/24/2012    CALCIUM 9.7 12/08/2020    BILITOT 0.5 12/08/2020    ALKPHOS 71 12/08/2020    AST 39 12/08/2020    ALT 66 12/08/2020     Hepatic Function Panel:    Lab Results   Component Value Date    ALKPHOS 71 12/08/2020    ALT 66 12/08/2020    AST 39 12/08/2020    PROT 7.5 12/08/2020    BILITOT 0.5 12/08/2020    BILIDIR 0.2 02/24/2012    LABALBU 4.4 12/08/2020    LABALBU 4.4 02/24/2012     HgBA1c:    Lab Results   Component Value Date    LABA1C 6.7 01/14/2021     FLP:    Lab Results   Component Value Date    TRIG 160 12/08/2020    HDL 37 12/08/2020    LDLCALC 107 12/08/2020    LABVLDL 32 12/08/2020     No imaging to review     All labs  were personally reviewed at the time of this visit    Assessment:     Neuropathy of BLE  --- history of DM and left metatarsal release  --- exam with decreased PP and vibrations at ankles bilaterally  --- will obtain EMG for further evaluation     Plan:     EMG of BLE    Follow up in 4 weeks    Call with any questions or concerns      MILIND Jesus, WILMAP-C  11:21 AM  4/16/2021    I spent 45 minutes with this patient obtaining the HPI and discussing the exam with greater than 50% of the time providing counseling and education on medications and other treatment plans. All questions were answered prior to leaving my office.

## 2021-04-23 ENCOUNTER — HOSPITAL ENCOUNTER (OUTPATIENT)
Dept: NEUROLOGY | Age: 66
Discharge: HOME | End: 2021-04-23
Payer: COMMERCIAL

## 2021-04-23 PROCEDURE — 95886 MUSC TEST DONE W/N TEST COMP: CPT

## 2021-04-23 PROCEDURE — 95886 MUSC TEST DONE W/N TEST COMP: CPT | Performed by: PSYCHIATRY & NEUROLOGY

## 2021-04-23 PROCEDURE — 95911 NRV CNDJ TEST 9-10 STUDIES: CPT | Performed by: PSYCHIATRY & NEUROLOGY

## 2021-04-23 PROCEDURE — 95911 NRV CNDJ TEST 9-10 STUDIES: CPT

## 2021-04-23 NOTE — PROCEDURES
or sensory compromise. Her difficulties are related to her radicular disease. Imaging studies of lumbosacral spine were recommended. Clinical correlation was highly advised.

## 2021-05-18 ENCOUNTER — TELEPHONE (OUTPATIENT)
Dept: VASCULAR SURGERY | Age: 66
End: 2021-05-18

## 2021-05-18 ENCOUNTER — OFFICE VISIT (OUTPATIENT)
Dept: NEUROLOGY | Age: 66
End: 2021-05-18
Payer: COMMERCIAL

## 2021-05-18 ENCOUNTER — TELEPHONE (OUTPATIENT)
Dept: NEUROLOGY | Age: 66
End: 2021-05-18

## 2021-05-18 VITALS
OXYGEN SATURATION: 95 % | SYSTOLIC BLOOD PRESSURE: 132 MMHG | HEART RATE: 72 BPM | DIASTOLIC BLOOD PRESSURE: 77 MMHG | BODY MASS INDEX: 29.8 KG/M2 | HEIGHT: 72 IN | TEMPERATURE: 97.8 F | WEIGHT: 220 LBS

## 2021-05-18 DIAGNOSIS — I83.891 SYMPTOMATIC VARICOSE VEINS, RIGHT: ICD-10-CM

## 2021-05-18 DIAGNOSIS — M50.10 CERVICAL DISC DISORDER WITH RADICULOPATHY: Primary | ICD-10-CM

## 2021-05-18 DIAGNOSIS — G62.9 NEUROPATHY: ICD-10-CM

## 2021-05-18 PROCEDURE — G8427 DOCREV CUR MEDS BY ELIG CLIN: HCPCS | Performed by: NURSE PRACTITIONER

## 2021-05-18 PROCEDURE — 3017F COLORECTAL CA SCREEN DOC REV: CPT | Performed by: NURSE PRACTITIONER

## 2021-05-18 PROCEDURE — G8417 CALC BMI ABV UP PARAM F/U: HCPCS | Performed by: NURSE PRACTITIONER

## 2021-05-18 PROCEDURE — 1036F TOBACCO NON-USER: CPT | Performed by: NURSE PRACTITIONER

## 2021-05-18 PROCEDURE — 99214 OFFICE O/P EST MOD 30 MIN: CPT | Performed by: NURSE PRACTITIONER

## 2021-05-18 NOTE — PROGRESS NOTES
[address] [facility] [place] [facility] M.D., F.A.CGiovanniPGiovanni Bucko Rubio, DNP, APRN, ACNS-BC  Colleen Chirinos, MSN, APRN-FNP-C  Kaila Sweet, MSN, APRN-FNP-C  RACHELL Gonzales, PAComfortC  Jey Carranza, MSN, APRN-FNP-C  [address] [facility] [address] [place], [address]  Phone: [phone]  Fax: [phone]       Romana Cons is a 60 y.o. left handed male     Patient follows for BLE neuropathy    Onset was a month ago and it started out with his left foot and now both feet. He has feeling of numbness and pain intermittently throughout the day. He has sharp shooting pains in his calves at times. It feels like walking on cardboard. It is aggravated by activity. He has adjusted his diet and no longer eats red meat as he thought this would benefit and alleviate the symptoms. It is relieved by rest but it can still be painful even while resting. His foot doctor prescribed gabapentin which works periodically. He is not currently on gabapentin. He was also given a steroid shot in his left foot which relieved the pain for 3-4 days but the pain came back and stayed. He has a history of left metatarsal release > 10 years ago. He walks and workouts out 3 days a week. He was diagnosed in 2006 with diabetes and started taking diabetes medications in 2008. EMG results of BLE suggest motor radiculopathy bilaterally L5 level. Today patient has complaints of neck pain which has gotten worse making it hard for him to turn his head side to side. He did have a MRI of his C spine back in 2014 which showed multiple levels of stenosis and disc protrusion. He notes pain radiating into both of his shoulders from his neck. He does have paresthesias in his wrists bilaterally but believes it may be from carpal tunnel. He has had PT in the past for his cervical pain. He also has concerns of a varicose vein in his right calf that he would like looked at.   He has a history of varicose veins in his legs. EMG results were discussed with him.       No issues with chewing or swallowing  No chest pain or palpitations  No SOB  No vertigo, lightheadedness or loss of consciousness  No falls, tripping or stumbling  + numbness and tingling of BLE   No focal arm/leg weakness    ROS is otherwise negative    Objective:     Vitals:    05/18/21 1057   BP: 132/77   Site: Right Upper Arm   Pulse: 72   Temp: 97.8 °F (36.6 °C)   SpO2: 95%   Weight: 220 lb (99.8 kg)   Height: 6' (1.829 m)     General appearance: alert, appears stated age, cooperative and in no distress  Head: normocephalic, without obvious abnormality, atraumatic  Eyes: conjunctivae/corneas clear; no drainage  Neck:  symmetrical, trachea midline, limited ROM especially when turning head left and right, pain elicited with cervical spine palpated from levels C 1-7  Lungs: clear to auscultation bilaterally  Heart: regular rate and rhythm, S1, S2 normal  Abdomen: soft, non-tender; bowel sounds normal  Extremities: normal, atraumatic, no cyanosis or edema  Skin:  color, texture, turgor normal--no rashes or lesions      Mental Status: alert and oriented x 4    Appropriate attention/concentration  Intact fundus of knowledge  Repetition intact  Memories intact    Speech: no dysarthria  Language: no aphasias    Cranial Nerves:  I: smell    II: visual acuity     II: visual fields Full    II: pupils ANNAMARIA   III,VII: ptosis None   III,IV,VI: extraocular muscles  EOMI without nystagmus   V: mastication Normal   V: facial light touch sensation  Normal   V,VII: corneal reflex     VII: facial muscle function - upper  Normal   VII: facial muscle function - lower Normal   VIII: hearing Normal   IX: soft palate elevation  Normal   IX,X: gag reflex    XI: trapezius strength  5/5   XI: sternocleidomastoid strength 5/5   XI: neck extension strength  5/5   XII: tongue strength  Normal     Motor:  5/5 throughout  Normal bulk and tone  No drift   No abnormal movements    Sensory:  LT and PP normal except decreased at feet and decreased PP at C 5-7  Vibration normal decrease at ankles bilaterally     Coordination:   FN, FFM and XAVIER normal  HS normal    Gait:  Normal    DTR:   2+ throughout     No Babinskis    No other pathological reflexes    Laboratory/Radiology:  ry/Radiology:     EMG: Nerve conduction studies in both legs revealed the following abnormalities---decreased compound motor action potentials in the left peroneal nerve, recording over the extensor digitorum brevis muscle. The distal motor latency of that nerve was also slow. The F-wave latencies of both peroneal and tibial nerves were prolonged, as well as the H reflex responses.     These findings were compared to the referential values in this laboratory, available upon request.     Monopolar needle examination of the left leg found chronic denervation changes in the muscle supplied primarily by the L5 motor root. Needle testing of the paraspinals displayed acute denervation changes.     Electrodiagnostic examination of both legs disclosed evidence diagnostic of a left L5 motor radiculopathy or intracanalicular lesion. This was proven with the abnormal needle testing of the paraspinals. The prolonged F-wave responses from the right peroneal and both tibial nerves, as well as delayed H reflexes suggestive bilateral S1 radicular disease as well as right L5 involvement. However, needle testing of the left leg did not confirm S1 radicular disease on that side. Needle testing was not performed in the right leg to further delineate the abnormalities on that side.     There were no other motor radiculopathies or intracanalicular lesions. There were no definitive peripheral neuropathies. Sensory radiculopathies cannot be evaluated by electrodiagnostic means.     Clinically, the patient presented with longstanding back pain radiating especially into the left leg.   On brief neurological examination, I found no motor or sensory compromise. Her difficulties are related to her radicular disease. Imaging studies of lumbosacral spine were recommended. All labs  were personally reviewed at the time of this visit    Assessment:     Neuropathy of BLE  --- history of DM and left metatarsal release  --- exam with decreased PP and vibrations at ankles bilaterally  --- EMG suggests L5 radiculopathy bilaterally into legs    Cervical pain with radiculopathy into shoulders bilaterally   --- history of cervical pain with last MRI C spine done in 2014 with abnormalities found: disc protrusions and stenosis at multiple cervical levels  --- decreased PP at C 5-7 levels   --- will repeat MRI C spine to check for worsening of pre-existing Cervical abnormalities due to worsening neck pain     Plan:     Patient would like to have cervical spine evaluated due to increased neck pain and radiculopathy into his shoulders. He did not want to further pursue imaging of his lumbar spine as suggested in his EMG.     MRI C spine without contrast    Referral to Vascular for varicose vein Right calf    Follow up in 3 months     Call with any questions or concerns      MILIND Castrejon - CNP, MILIND, FNP-C  10:37 AM  5/18/2021

## 2021-05-18 NOTE — TELEPHONE ENCOUNTER
Received referral from Dr. Pearl Marion for varicose veins, left message for patient to schedule appointment with Dr. Tayla Bateman.

## 2021-05-18 NOTE — TELEPHONE ENCOUNTER
Referral for MRI of C spine sent to nurse review.   Electronically signed by Rocky Goldstein MA on 5/18/21 at 12:10 PM EDT

## 2021-05-27 ENCOUNTER — TELEPHONE (OUTPATIENT)
Dept: NEUROLOGY | Age: 66
End: 2021-05-27

## 2021-05-27 NOTE — TELEPHONE ENCOUNTER
Referral for MRI Cervical spine can not be approved. The clinical information does not support medical guidelines for this procedure.   Electronically signed by Mine Villanueva MA on 5/27/21 at 1:22 PM EDT

## 2021-06-02 ENCOUNTER — TELEPHONE (OUTPATIENT)
Dept: VASCULAR SURGERY | Age: 66
End: 2021-06-02

## 2021-06-03 ENCOUNTER — OFFICE VISIT (OUTPATIENT)
Dept: VASCULAR SURGERY | Age: 66
End: 2021-06-03
Payer: COMMERCIAL

## 2021-06-03 ENCOUNTER — HOSPITAL ENCOUNTER (OUTPATIENT)
Age: 66
Discharge: HOME | End: 2021-06-03
Payer: COMMERCIAL

## 2021-06-03 VITALS — BODY MASS INDEX: 29.8 KG/M2 | HEIGHT: 72 IN | WEIGHT: 220 LBS

## 2021-06-03 DIAGNOSIS — I83.811 VARICOSE VEINS OF LEG WITH PAIN, RIGHT: ICD-10-CM

## 2021-06-03 LAB
BUN BLDV-MCNC: 25 MG/DL (ref 6–23)
CREAT SERPL-MCNC: 1.1 MG/DL (ref 0.7–1.2)
GFR AFRICAN AMERICAN: >60
GFR NON-AFRICAN AMERICAN: >60 ML/MIN/1.73

## 2021-06-03 PROCEDURE — 1036F TOBACCO NON-USER: CPT | Performed by: SURGERY

## 2021-06-03 PROCEDURE — G8417 CALC BMI ABV UP PARAM F/U: HCPCS | Performed by: SURGERY

## 2021-06-03 PROCEDURE — 82565 ASSAY OF CREATININE: CPT

## 2021-06-03 PROCEDURE — 99204 OFFICE O/P NEW MOD 45 MIN: CPT | Performed by: SURGERY

## 2021-06-03 PROCEDURE — G8427 DOCREV CUR MEDS BY ELIG CLIN: HCPCS | Performed by: SURGERY

## 2021-06-03 PROCEDURE — 84520 ASSAY OF UREA NITROGEN: CPT

## 2021-06-03 PROCEDURE — 3017F COLORECTAL CA SCREEN DOC REV: CPT | Performed by: SURGERY

## 2021-06-03 PROCEDURE — 36415 COLL VENOUS BLD VENIPUNCTURE: CPT

## 2021-06-03 NOTE — PROGRESS NOTES
capsule,delayed release 30 capsule 5    diclofenac sodium (VOLTAREN) 1 % GEL Apply 2 g topically 2 times daily 1 Tube 5    mupirocin (BACTROBAN) 2 % ointment Apply topically 3 times daily. 22 g 5    melatonin 3 MG TABS tablet TAKE ONE TABLET BY MOUTH EVERY DAY 30 tablet 3    gabapentin (NEURONTIN) 300 MG capsule Take 300 mg by mouth 3 times daily. .       No current facility-administered medications for this visit. Past Medical History:   Diagnosis Date    BPH (benign prostatic hyperplasia)     Chronic back pain     DDD (degenerative disc disease), cervical     neck, back    Depression     Fibromyalgia     GERD (gastroesophageal reflux disease)     Headache     Hepatitis C     Irritable bowel syndrome     Joint pain     feet and ankle pain    Memory difficulties     Neck pain     Neuropathy of foot     Osteoarthritis     Type II or unspecified type diabetes mellitus without mention of complication, not stated as uncontrolled     Varicose veins of bilateral lower extremities with pain     Varicose veins of leg with pain, right 6/3/2021       Past Surgical History:   Procedure Laterality Date    ABSCESS DRAINAGE      left kidney    COLONOSCOPY      2012    ENDOSCOPY, COLON, DIAGNOSTIC      2012    FOOT SURGERY  1998    KNEE ARTHROSCOPY Left 11/04/2016    left knee arthroscopy medial menicestomy debridment synovectomy    MOUTH SURGERY      implants    VARICOSE VEIN SURGERY         History reviewed. No pertinent family history.     Social History     Socioeconomic History    Marital status:      Spouse name: Not on file    Number of children: Not on file    Years of education: Not on file    Highest education level: Not on file   Occupational History    Not on file   Tobacco Use    Smoking status: Never Smoker    Smokeless tobacco: Never Used   Substance and Sexual Activity    Alcohol use: No    Drug use: No    Sexual activity: Yes     Partners: Female   Other Topics Concern    Not on file   Social History Narrative    Not on file     Social Determinants of Health     Financial Resource Strain:     Difficulty of Paying Living Expenses:    Food Insecurity:     Worried About Running Out of Food in the Last Year:     [address] in the Last Year:    Transportation Needs:     Lack of Transportation (Medical):  Lack of Transportation (Non-Medical):    Physical Activity:     Days of Exercise per Week:     Minutes of Exercise per Session:    Stress:     Feeling of Stress :    Social Connections:     Frequency of Communication with Friends and Family:     Frequency of Social Gatherings with Friends and Family:     Attends Confucianist Services:     Active Member of Clubs or Organizations:     Attends Club or Organization Meetings:     Marital Status:    Intimate Partner Violence:     Fear of Current or Ex-Partner:     Emotionally Abused:     Physically Abused:     Sexually Abused:        Review of Systems:  Skin:  No abnormal pigmentation or rash  Eyes:  No blurring, diplopia or vision loss  Ears/Nose/Throat:  No hearing loss or vertigo  Respiratory:  No cough, pleuritic chest pain, dyspnea, or wheezing. Cardiovascular: No angina, palpitations . Gastrointestinal:  No nausea or vomiting; no abdominal pain or rectal bleeding. History of hepatitis, GERD  Musculoskeletal:  No arthritis or weakness. Fibromyalgia  Neurologic:  No paralysis, paresis, paresthesia, seizures or headaches  Hematologic/Lymphatic/Immunologic:  No anemia, abnormal bleeding/bruising, fever, chills or night sweats. Endocrine:  No heat or cold intolerance. No polyphagia, polydipsia or polyuria. Diabetes mellitus with diabetic neuropathy      Physical Exam:  General appearance:  Alert, awake, oriented x 3. No distress. Skin:  Warm and dry  Head:  Normocephalic.   No masses, lesions or tenderness  Eyes:  Conjunctivae appear normal; PERRL  Ears:  External ears normal  Nose/Sinuses:  Septum midline, mucosa normal; no drainage  Oropharynx:  Clear, no exudate noted  Neck:  No jugular venous distention, lymphadenopathy or thyromegaly. No evidence of carotid bruit  Lungs:  Clear to ausculation bilaterally. No rhonchi, crackles, wheezes  Heart:  Regular rate and rhythm. No rub or murmur  Abdomen:  Soft, non-tender. No masses, organomegaly. Musculoskeletal : No joint effusions, tenderness swelling    Neuro: Speech is intact. Moving all extremities. No focal motor or sensory deficits      Extremities:  Both feet are warm to touch. The color of both feet is normal.    Mild varicose veins of the right leg mainly over the posterior popliteal fossa on the right side with minimal right ankle swelling without any tenderness    Pulses Right  Left    Brachial 3 3    Radial    3=normal   Femoral 2 2  2=diminished   Popliteal    1=barely palpable   Dorsalis pedis    0=absent   Posterior tibial 2 2  4=aneurysmal             Other pertinent information:1. The past medical records were reviewed. Assessment:    1. Varicose veins of leg with pain, right              Plan:       Discussed the patient, options risks benefits and alternatives were explained, patient is reassured, will recommend conservative therapy with compression stockings, baseline venous ultrasound study and to call me as needed if any increasing pain or swelling of the legs in the future       All the questions were answered. Orders Placed This Encounter   Procedures    US LOWER EXTREMITY RIGHT VEIN MAPPING W DVT     Orders Placed This Encounter   Medications    Elastic Bandages & Supports (JOBST KNEE HIGH COMPRESSION ) MISC     Sig: Knee high with 20- 30 mmhg of compression     Dispense:  1 each     Refill:  10           Indicated follow-up: Return if symptoms worsen or fail to improve.      CC to Seda Alvarado APRN-CNP

## 2021-06-09 ENCOUNTER — HOSPITAL ENCOUNTER (OUTPATIENT)
Dept: INTERVENTIONAL RADIOLOGY/VASCULAR | Age: 66
Discharge: HOME | End: 2021-06-11
Payer: COMMERCIAL

## 2021-06-09 DIAGNOSIS — I83.811 VARICOSE VEINS OF LEG WITH PAIN, RIGHT: ICD-10-CM

## 2021-06-09 PROCEDURE — 93971 EXTREMITY STUDY: CPT

## 2021-07-13 ENCOUNTER — OFFICE VISIT (OUTPATIENT)
Dept: FAMILY MEDICINE CLINIC | Age: 66
End: 2021-07-13
Payer: COMMERCIAL

## 2021-07-13 VITALS
SYSTOLIC BLOOD PRESSURE: 124 MMHG | HEART RATE: 58 BPM | OXYGEN SATURATION: 97 % | WEIGHT: 226 LBS | RESPIRATION RATE: 20 BRPM | HEIGHT: 72 IN | DIASTOLIC BLOOD PRESSURE: 78 MMHG | BODY MASS INDEX: 30.61 KG/M2

## 2021-07-13 DIAGNOSIS — E11.42 TYPE 2 DIABETES MELLITUS WITH DIABETIC POLYNEUROPATHY, WITHOUT LONG-TERM CURRENT USE OF INSULIN (HCC): Primary | ICD-10-CM

## 2021-07-13 LAB — HBA1C MFR BLD: 7 %

## 2021-07-13 PROCEDURE — 99214 OFFICE O/P EST MOD 30 MIN: CPT | Performed by: FAMILY MEDICINE

## 2021-07-13 PROCEDURE — 3051F HG A1C>EQUAL 7.0%<8.0%: CPT | Performed by: FAMILY MEDICINE

## 2021-07-13 PROCEDURE — G8427 DOCREV CUR MEDS BY ELIG CLIN: HCPCS | Performed by: FAMILY MEDICINE

## 2021-07-13 PROCEDURE — G8417 CALC BMI ABV UP PARAM F/U: HCPCS | Performed by: FAMILY MEDICINE

## 2021-07-13 PROCEDURE — 1036F TOBACCO NON-USER: CPT | Performed by: FAMILY MEDICINE

## 2021-07-13 PROCEDURE — 2022F DILAT RTA XM EVC RTNOPTHY: CPT | Performed by: FAMILY MEDICINE

## 2021-07-13 PROCEDURE — 83036 HEMOGLOBIN GLYCOSYLATED A1C: CPT | Performed by: FAMILY MEDICINE

## 2021-07-13 PROCEDURE — 3017F COLORECTAL CA SCREEN DOC REV: CPT | Performed by: FAMILY MEDICINE

## 2021-07-13 RX ORDER — GLIPIZIDE AND METFORMIN HCL 5; 500 MG/1; MG/1
1 TABLET, FILM COATED ORAL
Qty: 60 TABLET | Refills: 5 | Status: SHIPPED
Start: 2021-07-13 | End: 2022-01-24 | Stop reason: SDUPTHER

## 2021-07-13 SDOH — ECONOMIC STABILITY: FOOD INSECURITY: WITHIN THE PAST 12 MONTHS, YOU WORRIED THAT YOUR FOOD WOULD RUN OUT BEFORE YOU GOT MONEY TO BUY MORE.: NEVER TRUE

## 2021-07-13 SDOH — ECONOMIC STABILITY: FOOD INSECURITY: WITHIN THE PAST 12 MONTHS, THE FOOD YOU BOUGHT JUST DIDN'T LAST AND YOU DIDN'T HAVE MONEY TO GET MORE.: NEVER TRUE

## 2021-07-13 ASSESSMENT — ENCOUNTER SYMPTOMS
DIARRHEA: 0
VOMITING: 0
NAUSEA: 0
SHORTNESS OF BREATH: 1

## 2021-07-13 ASSESSMENT — LIFESTYLE VARIABLES: HOW OFTEN DO YOU HAVE A DRINK CONTAINING ALCOHOL: NEVER

## 2021-07-13 NOTE — PATIENT INSTRUCTIONS
Patient Education        Learning About Cutting Calories  How do calories affect your weight? Food gives your body energy. Energy from the food you eat is measured in calories. This energy keeps your heart beating, your brain active, and your muscles working. Your body needs a certain number of calories each day. After your body uses the calories it needs, it stores extra calories as fat. To lose weight safely, you have to eat fewer calories while eating in a healthy way. How many calories do you need each day? The more active you are, the more calories you need. When you are less active, you need fewer calories. How many calories you need each day also depends on several things, including your age and whether you are male or female. Here are some general guidelines for adults:  · Less active women and older adults need 1,600 to 2,000 calories each day. · Active women and less active men need 2,000 to 2,400 calories each day. · Active men need 2,400 to 3,000 calories each day. How can you cut calories and eat healthy meals? Whole grains, vegetables and fruits, and dried beans are good lower-calorie foods. They give you lots of nutrients and fiber. And they fill you up. Sweets, energy drinks, and soda pop are high in calories. They give you few nutrients and no fiber. Try to limit soda pop, fruit juice, and energy drinks. Drink water instead. Some fats can be part of a healthy diet. But cutting back on fats from highly processed foods like fast foods and many snack foods is a good way to lower the calories in your diet. Also, use smaller amounts of fats like butter, margarine, salad dressing, and mayonnaise. Add fresh garlic, lemon, or herbs to your meals to add flavor without adding fat. Meats and dairy products can be a big source of hidden fats. Try to choose lean or low-fat versions of these products. Fat-free cookies, candies, chips, and frozen treats can still be high in sugar and calories.  Some fat-free foods have more calories than regular ones. Eat fat-free treats in moderation, as you would other foods. If your favorite foods are high in fat, salt, sugar, or calories, limit how often you eat them. Eat smaller servings, or look for healthy substitutes. Fill up on fruits, vegetables, and whole grains. Eating at home  · Use meat as a side dish instead of as the main part of your meal.  · Try main dishes that use whole wheat pasta, brown rice, dried beans, or vegetables. · Find ways to cook with little or no fat, such as broiling, steaming, or grilling. · Use cooking spray instead of oil. If you use oil, use a monounsaturated oil, such as canola or olive oil. · Trim fat from meats before you cook them. · Drain off fat after you brown the meat or while you roast it. · Chill soups and stews after you cook them. Then skim the fat off the top after it hardens. Eating out  · Order foods that are broiled or poached rather than fried or breaded. · Cut back on the amount of butter or margarine that you use on bread. · Order sauces, gravies, and salad dressings on the side, and use only a little. · When you order pasta, choose tomato sauce rather than cream sauce. · Ask for salsa with your baked potato instead of sour cream, butter, cheese, or julien. · Order meals in a small size instead of upgrading to a large. · Share an entree, or take part of your food home to eat as another meal.  · Share appetizers and desserts. Where can you learn more? Go to https://[email or web address] org and sign in to your [employer] account. Enter N862 in the [employer] box to learn more about \"Learning About Cutting Calories. \"     If you do not have an account, please click on the \"Sign Up Now\" link. Current as of: December 17, 2020               Content Version: 12.9  © 0108-2522 Healthwise, Incorporated. Care instructions adapted under license by [facility] [address]  If you have questions about a medical condition or this instruction, always ask your healthcare professional. [place] [address] any warranty or liability for your use of this information.

## 2021-07-13 NOTE — PROGRESS NOTES
OFFICE PROGRESS NOTE      SUBJECTIVE:        Patient ID:   Teri Beck is a 60 y.o. male whopresents for   Chief Complaint   Patient presents with    Diabetes           HPI:   Patient is here to follow up on diabetes. Fasting blood sugars:115-120's Midday blood sugars: not checking. Patient checks blood glucose 1 times per day. Patient is following diabetic diet. Patient is a nonsmoker. Last ophthalmology visit: 1/2021. Patient is unable to tolerate daily statin. Recent lab results reviewed including CMP, CBC, TSH, and lipid panel which are remarkable for hyperglycemia. Last urine microalbumin: 12/2020    Patient was diagnosed recently with gastritis. Taking omeprazole per Dr. Cory Michael. Prior to Admission medications    Medication Sig Start Date End Date Taking? Authorizing Provider   glipiZIDE-metFORMIN (METAGLIP) 5-500 MG per tablet Take 1 tablet by mouth 2 times daily (before meals) 7/13/21  Yes Eb Flores MD   omeprazole (PRILOSEC) 20 MG delayed release capsule TAKE ONE CAPSULE BY MOUTH EVERY DAY 7/9/21  Yes bE Flores MD   diclofenac sodium (VOLTAREN) 1 % GEL APPLY 2 GRAMS TOPICALLY TWICE DAILY 6/14/21  Yes Eb Flores MD   Elastic Bandages & Supports (JOBST KNEE HIGH COMPRESSION SM) MISC Knee high with 20- 30 mmhg of compression 6/3/21  Yes Zofia Ann MD   ONE TOUCH ULTRASOFT LANCETS MISC Check blood sugar BID 4/14/21  Yes Eb Flores MD   Blood Glucose Monitoring Suppl (ONE TOUCH ULTRA 2) w/Device KIT Check blood sugar BID 4/14/21  Yes Eb Flores MD   blood glucose test strips (ASCENSIA AUTODISC VI;ONE TOUCH ULTRA TEST VI) strip Check blood sugar BID 4/14/21  Yes Eb Flores MD   Prenatal Vit-Fe Fumarate-FA (PREPLUS) 27-1 MG TABS TAKE ONE TABLET BY MOUTH EVERY DAY 12/25/20  Yes Historical Provider, MD   mupirocin (BACTROBAN) 2 % ointment Apply topically 3 times daily.  7/22/20  Yes Kristina Abo MD Tez   melatonin 3 MG TABS tablet TAKE ONE TABLET BY MOUTH EVERY DAY 4/23/19  Yes Cortez Campbell MD   gabapentin (NEURONTIN) 300 MG capsule Take 300 mg by mouth 3 times daily. .   Yes Historical Provider, MD     Social History     Socioeconomic History    Marital status:      Spouse name: None    Number of children: None    Years of education: None    Highest education level: None   Occupational History    None   Tobacco Use    Smoking status: Never Smoker    Smokeless tobacco: Never Used   Substance and Sexual Activity    Alcohol use: No    Drug use: No    Sexual activity: Yes     Partners: Female   Other Topics Concern    None   Social History Narrative    None     Social Determinants of Health     Financial Resource Strain:     Difficulty of Paying Living Expenses:    Food Insecurity: No Food Insecurity    Worried About Running Out of Food in the Last Year: Never true    Arlyn of Food in the Last Year: Never true   Transportation Needs:     Lack of Transportation (Medical):      Lack of Transportation (Non-Medical):    Physical Activity:     Days of Exercise per Week:     Minutes of Exercise per Session:    Stress: No Stress Concern Present    Feeling of Stress : Not at all   Social Connections:     Frequency of Communication with Friends and Family:     Frequency of Social Gatherings with Friends and Family:     Attends Moravian Services:     Active Member of Clubs or Organizations:     Attends Club or Organization Meetings:     Marital Status:    Intimate Partner Violence:     Fear of Current or Ex-Partner:     Emotionally Abused:     Physically Abused:     Sexually Abused:        I have reviewed Nakia's allergies, medications, problem list, medical, social and family history and have updated as needed in the electronic medical record    Current Outpatient Medications   Medication Sig Dispense Refill    glipiZIDE-metFORMIN (METAGLIP) 5-500 MG polyneuropathy, without long-term current use of insulin (HCC)  -     glipiZIDE-metFORMIN (METAGLIP) 5-500 MG per tablet; Take 1 tablet by mouth 2 times daily (before meals)  -     POCT glycosylated hemoglobin (Hb A1C)  -     Diabetic Foot Exam            Phone/MyChart follow up if tests abnormal.    Return in about 3 months (around 10/13/2021) for diabetes. I have reviewed my findings and recommendations with Cj Hamm.     Ria Collins MD, M.D

## 2021-08-18 ENCOUNTER — OFFICE VISIT (OUTPATIENT)
Dept: NEUROLOGY | Age: 66
End: 2021-08-18
Payer: COMMERCIAL

## 2021-08-18 VITALS
RESPIRATION RATE: 20 BRPM | TEMPERATURE: 97.6 F | HEART RATE: 54 BPM | DIASTOLIC BLOOD PRESSURE: 77 MMHG | HEIGHT: 72 IN | OXYGEN SATURATION: 98 % | WEIGHT: 226 LBS | SYSTOLIC BLOOD PRESSURE: 127 MMHG | BODY MASS INDEX: 30.61 KG/M2

## 2021-08-18 DIAGNOSIS — G62.9 NEUROPATHY: ICD-10-CM

## 2021-08-18 DIAGNOSIS — M50.10 CERVICAL DISC DISORDER WITH RADICULOPATHY: Primary | ICD-10-CM

## 2021-08-18 PROCEDURE — 3017F COLORECTAL CA SCREEN DOC REV: CPT | Performed by: NURSE PRACTITIONER

## 2021-08-18 PROCEDURE — 99214 OFFICE O/P EST MOD 30 MIN: CPT | Performed by: NURSE PRACTITIONER

## 2021-08-18 PROCEDURE — G8427 DOCREV CUR MEDS BY ELIG CLIN: HCPCS | Performed by: NURSE PRACTITIONER

## 2021-08-18 PROCEDURE — G8417 CALC BMI ABV UP PARAM F/U: HCPCS | Performed by: NURSE PRACTITIONER

## 2021-08-18 PROCEDURE — 1036F TOBACCO NON-USER: CPT | Performed by: NURSE PRACTITIONER

## 2021-08-18 RX ORDER — BACLOFEN 10 MG/1
10 TABLET ORAL 2 TIMES DAILY
Qty: 60 TABLET | Refills: 3 | Status: SHIPPED
Start: 2021-08-18 | End: 2021-12-15

## 2021-08-18 NOTE — PROGRESS NOTES
[address] [facility]. Renetta Antonio M.D., F.A.C.P. Alek Cummings, DNP, APRN, ACNS-BC  Margareth Hudson. Bridget Hodgson, MSN, APRN-FNP-C  Cathryn Arriaga, MSN, APRN-FNP-C  Elisa Miller, MSPAS, PA-C  Samuel Benitez, MSN, APRN-FNP-C  [address] [place], [address]  Phone: [phone]  Fax: [phone]       Emilee Armendariz is a 60 y.o. left handed male     Patient follows for BLE neuropathy    Onset was a month ago and it started out with his left foot and now both feet. He has feeling of numbness and pain intermittently throughout the day. He has sharp shooting pains in his calves at times. It feels like walking on cardboard. It is aggravated by activity. He has adjusted his diet and no longer eats red meat as he thought this would benefit and alleviate the symptoms. It is relieved by rest but it can still be painful even while resting. His foot doctor prescribed gabapentin which works periodically. He is not currently on gabapentin. He was also given a steroid shot in his left foot which relieved the pain for 3-4 days but the pain came back and stayed. He has a history of left metatarsal release > 10 years ago. He walks and workouts out 3 days a week. He was diagnosed in 2006 with diabetes and started taking diabetes medications in 2008. EMG results of BLE suggest motor radiculopathy bilaterally L5 level. Complaints of neck pain which has gotten worse making it hard for him to turn his head side to side. He did have a MRI of his C spine back in 2014 which showed multiple levels of stenosis and disc protrusion. He notes pain radiating into both of his shoulders from his neck. He does have paresthesias in his wrists bilaterally but believes it may be from carpal tunnel. He has had PT in the past for his cervical pain and injections.     Ordered MRI C-spine at last visit which was denied patient still complaining of neck pain and tension type headaches. Patient asked for neck brace. Has neuropathy in his BLE has gotten a little better. He continues to swim workout every day.   He is following with renal now due to renal cyst    No issues with chewing or swallowing  No chest pain or palpitations  No SOB  No vertigo, lightheadedness or loss of consciousness  No falls, tripping or stumbling  + numbness and tingling of BLE   No focal arm/leg weakness    ROS is otherwise negative    Objective:     Vitals:    08/18/21 1108   BP: 127/77   Site: Right Upper Arm   Position: Sitting   Cuff Size: Medium Adult   Pulse: 54   Resp: 20   Temp: 97.6 °F (36.4 °C)   SpO2: 98%   Weight: 226 lb (102.5 kg)   Height: 6' (1.829 m)     General appearance: alert, appears stated age, cooperative and in no distress  Head: normocephalic, without obvious abnormality, atraumatic  Eyes: conjunctivae/corneas clear; no drainage  Neck:  symmetrical, trachea midline, limited ROM especially when turning head left and right, pain elicited with cervical spine palpated from levels C 1-7  Lungs: clear to auscultation bilaterally  Heart: regular rate and rhythm, S1, S2 normal  Abdomen: soft, non-tender; bowel sounds normal  Extremities: normal, atraumatic, no cyanosis or edema  Skin:  color, texture, turgor normal--no rashes or lesions      Mental Status: alert and oriented x 4    Appropriate attention/concentration  Intact fundus of knowledge  Repetition intact  Memories intact    Speech: no dysarthria  Language: no aphasias    Cranial Nerves:  I: smell    II: visual acuity     II: visual fields Full    II: pupils ANNAMARIA   III,VII: ptosis None   III,IV,VI: extraocular muscles  EOMI without nystagmus   V: mastication Normal   V: facial light touch sensation  Normal   V,VII: corneal reflex     VII: facial muscle function - upper  Normal   VII: facial muscle function - lower Normal   VIII: hearing Normal   IX: soft palate elevation  Normal   IX,X: gag reflex    XI: trapezius strength  5/5   XI: sternocleidomastoid strength 5/5   XI: neck extension strength  5/5   XII: tongue strength  Normal     Motor:  5/5 throughout  Normal bulk and tone  No drift   No abnormal movements    Sensory:  LT normal    Coordination:   FN, FFM and XAVIER normal    Gait:  Normal    DTR:   2+ throughout     Laboratory/Radiology:  ry/Radiology:     EMG: Nerve conduction studies in both legs revealed the following abnormalities---decreased compound motor action potentials in the left peroneal nerve, recording over the extensor digitorum brevis muscle. The distal motor latency of that nerve was also slow. The F-wave latencies of both peroneal and tibial nerves were prolonged, as well as the H reflex responses.     These findings were compared to the referential values in this laboratory, available upon request.     Monopolar needle examination of the left leg found chronic denervation changes in the muscle supplied primarily by the L5 motor root. Needle testing of the paraspinals displayed acute denervation changes.     Electrodiagnostic examination of both legs disclosed evidence diagnostic of a left L5 motor radiculopathy or intracanalicular lesion. This was proven with the abnormal needle testing of the paraspinals. The prolonged F-wave responses from the right peroneal and both tibial nerves, as well as delayed H reflexes suggestive bilateral S1 radicular disease as well as right L5 involvement. However, needle testing of the left leg did not confirm S1 radicular disease on that side. Needle testing was not performed in the right leg to further delineate the abnormalities on that side.     There were no other motor radiculopathies or intracanalicular lesions. There were no definitive peripheral neuropathies. Sensory radiculopathies cannot be evaluated by electrodiagnostic means.     Clinically, the patient presented with longstanding back pain radiating especially into the left leg.   On brief neurological examination, I found no motor or sensory compromise. Her difficulties are related to her radicular disease. Imaging studies of lumbosacral spine were recommended. All labs  were personally reviewed at the time of this visit    Assessment:     Neuropathy of BLE  --- history of DM and left metatarsal release  --- exam with decreased PP and vibrations at ankles bilaterally  --- EMG suggests L5 radiculopathy bilaterally into legs    Cervical pain with radiculopathy into shoulders bilaterally   --- history of cervical pain with last MRI C spine done in 2014 with abnormalities found: disc protrusions and stenosis at multiple cervical levels  --- decreased PP at C 5-7 levels   --- Ordered MRI C spine but denied  --- TTH likely due to cervical pain and radiculopathy will trial baclofen    Plan:     Patient would like to have cervical spine evaluated due to increased neck pain and radiculopathy into his shoulders.      Started baclofen 10 mg twice daily    Follow up in 6 months     Call with any questions or concerns      MILIND Velazco CNP  11:21 AM  8/18/2021

## 2021-08-18 NOTE — PATIENT INSTRUCTIONS
Patient Education        baclofen (oral)  Pronunciation:  TIARA brownlee  Brand:  FIRST Baclofen, Ozobax  What is the most important information I should know about baclofen? Do not use baclofen at a time when you need muscle tone for safe balance and movement during certain activities. Do not stop using baclofen suddenly, or you could have unpleasant withdrawal symptoms. What is baclofen? Baclofen is a muscle relaxer that is used to treat muscle pain, spasms, and stiffness in people with multiple sclerosis or spinal cord injury or disease. Baclofen may also be used for purposes not listed in this medication guide. What should I discuss with my healthcare provider before taking baclofen? You should not use baclofen if you are allergic to it. Tell your doctor if you have ever had:  · mental illness or psychosis;  · a nervous system disorder;  · epilepsy or other seizure disorder;  · a stroke or blood clot; or  · kidney disease. Using baclofen may increase your risk of developing an ovarian cyst. Talk with your doctor about your specific risk. Tell your doctor if you are pregnant or breastfeeding. If you take baclofen during pregnancy, your  baby may have withdrawal symptoms such as tremors, rigid muscles, or a seizure. Follow your doctor's instructions about tapering your dose as your due date approaches. If you take baclofen while breastfeeding, withdrawal symptoms may occur in the nursing baby. Ask your doctor if it is safe for you to breastfeed while taking this medicine. Baclofen is not approved for use by anyone younger than 15years old. How should I take baclofen? Follow all directions on your prescription label and read all medication guides or instruction sheets. Your doctor may occasionally change your dose. Use the medicine exactly as directed. Shake the oral suspension (liquid) before you measure a dose.  Use the dosing syringe provided, or use a medicine dose-measuring device (not a kitchen spoon). Call your doctor if your muscle symptoms do not improve, or if they get worse. You should not stop using baclofen suddenly or you could have serious or fatal withdrawal symptoms. Follow your doctor's instructions about tapering your dose. Store at room temperature away from moisture and heat. What happens if I miss a dose? Take the medicine as soon as you can, but skip the missed dose if it is almost time for your next dose. Do not take two doses at one time. What happens if I overdose? Seek emergency medical attention or call the Poison Help line at 1-647.908.6398. Overdose symptoms may include muscle weakness, vomiting, severe dizziness or drowsiness, dilated or pinpoint pupils, shallow breathing, seizure, or loss of consciousness. What should I avoid while taking baclofen? Do not use baclofen at a time when you need muscle tone for safe balance and movement during certain activities. In some situations, it may be dangerous for you to have reduced muscle tone. Drinking alcohol with this medicine can cause side effects. Avoid driving or hazardous activity until you know how this medicine will affect you. Your reactions could be impaired. What are the possible side effects of baclofen? Get emergency medical help if you have signs of an allergic reaction: hives; difficult breathing; swelling of your face, lips, tongue, or throat. Call your doctor at once if you have:  · severe drowsiness, weak or shallow breathing;  · confusion, hallucinations;  · itching, tingling, or twitching in your hands, arms, feet, or legs;  · fever; or  · a seizure. Common side effects may include:  · drowsiness, dizziness, weakness, tiredness;  · headache;  · sleep problems (insomnia);  · nausea, constipation; or  · urinating more often than usual.  This is not a complete list of side effects and others may occur. Call your doctor for medical advice about side effects.  You may report side effects to FDA at 1-800-FDA-1088. What other drugs will affect baclofen? Using baclofen with other drugs that make you drowsy can worsen this effect. Ask your doctor before using opioid medication, a sleeping pill, a muscle relaxer, or medicine for anxiety or seizures. Other drugs may affect baclofen, including prescription and over-the-counter medicines, vitamins, and herbal products. Tell your doctor about all your current medicines and any medicine you start or stop using. Where can I get more information? Your pharmacist can provide more information about baclofen. Remember, keep this and all other medicines out of the reach of children, never share your medicines with others, and use this medication only for the indication prescribed. Every effort has been made to ensure that the information provided by [address] is accurate, up-to-date, and complete, but no guarantee is made to that effect. Drug information contained herein may be time sensitive. [facility] information has been compiled for use by healthcare practitioners and consumers in the [place] and therefore [facility] does not warrant that uses outside of the [place] are appropriate, unless specifically indicated otherwise. [facility]'s drug information does not endorse drugs, diagnose patients or recommend therapy. [facility] drug information is an informational resource designed to assist licensed healthcare practitioners in caring for their patients and/or to serve consumers viewing this service as a supplement to, and not a substitute for, the expertise, skill, knowledge and judgment of healthcare practitioners. The absence of a warning for a given drug or drug combination in no way should be construed to indicate that the drug or drug combination is safe, effective or appropriate for any given patient. [facility] does not assume any responsibility for any aspect of healthcare administered with the aid of information [facility] provides.  The information contained herein is not intended to cover all possible uses, directions, precautions, warnings, drug interactions, allergic reactions, or adverse effects. If you have questions about the drugs you are taking, check with your doctor, nurse or pharmacist.  Copyright 3739-6001 [address]. Version: 8.01. Revision date: 10/14/2020. Care instructions adapted under license by [facility] ([facility]). If you have questions about a medical condition or this instruction, always ask your healthcare professional. [place] [address] any warranty or liability for your use of this information.

## 2021-09-28 ENCOUNTER — EVALUATION (OUTPATIENT)
Dept: PHYSICAL THERAPY | Age: 66
End: 2021-09-28
Payer: COMMERCIAL

## 2021-09-28 DIAGNOSIS — M50.10 CERVICAL DISC DISORDER WITH RADICULOPATHY: Primary | ICD-10-CM

## 2021-09-28 PROCEDURE — 97162 PT EVAL MOD COMPLEX 30 MIN: CPT | Performed by: PHYSICAL THERAPIST

## 2021-09-28 NOTE — PROGRESS NOTES
[facility] OUTPATIENT REHABILITATION  PHYSICAL THERAPY INITIAL EVALUATION         Date:  2021   Patient: Malu Navas  : 1955  MRN: [id number]  Referring Provider: MILIND Rayo CNP  [facility] [address] [place] [address],  [facility]     Medical Diagnosis:      Diagnosis Orders   1. Cervical disc disorder with radiculopathy         Physician Order: Eval and Treat      SUBJECTIVE:     Onset date: 10 years    Onset: Sudden     History / Mechanism of Injury: Pt stated that his symptoms gradually came about without an onset. Pt stated that his neck has gradually been getting worse throughout years. Interventions for current problem: cortisone injections 6-7 years ago, no relief. Chief complaint: pain, decreased motion, decreased mobility, weakness    Behavior: condition is getting worse    Pain: constant  Current: 8/10     Best: 5/10     Worst:8/10 (occurs with morning stiffness). Pain returns to baseline in a half hour    Symptom Type / Quality: aching, sharp, throbbing  Location[de-identified] Neck: noted above midline over the spine with radiation to R shoulder. Provoking Activities/Positions: standing, walking long distances, bending, lifting/carrying/material handling                 Relieving Activities/Positions:  ice, voltaren     Disturbed Sleep: yes    Imaging results: No results found.     Past Medical History  Past Medical History:   Diagnosis Date    BPH (benign prostatic hyperplasia)     Chronic back pain     DDD (degenerative disc disease), cervical     neck, back    Depression     Fibromyalgia     GERD (gastroesophageal reflux disease)     Headache     Hepatitis C     Irritable bowel syndrome     Joint pain     feet and ankle pain    Memory difficulties     Neck pain     Neuropathy of foot     Osteoarthritis     Type II or unspecified type diabetes mellitus without mention of complication, not stated as uncontrolled     Varicose veins of bilateral lower extremities with pain     Varicose veins of leg with pain, right 6/3/2021     Past Surgical History:   Procedure Laterality Date    ABSCESS DRAINAGE      left kidney    COLONOSCOPY      2012    ENDOSCOPY, COLON, DIAGNOSTIC      2012    FOOT SURGERY  1998    KNEE ARTHROSCOPY Left 11/04/2016    left knee arthroscopy medial menicestomy debridment synovectomy    MOUTH SURGERY      implants    VARICOSE VEIN SURGERY         Medications:   Current Outpatient Medications   Medication Sig Dispense Refill    mupirocin (BACTROBAN) 2 % ointment Apply topically 3 times daily. 22 g 5    baclofen (LIORESAL) 10 MG tablet Take 1 tablet by mouth 2 times daily 60 tablet 3    glipiZIDE-metFORMIN (METAGLIP) 5-500 MG per tablet Take 1 tablet by mouth 2 times daily (before meals) 60 tablet 5    omeprazole (PRILOSEC) 20 MG delayed release capsule TAKE ONE CAPSULE BY MOUTH EVERY DAY 30 capsule 3    diclofenac sodium (VOLTAREN) 1 % GEL APPLY 2 GRAMS TOPICALLY TWICE DAILY 100 g 5    Elastic Bandages & Supports (JOBST KNEE HIGH COMPRESSION SM) MISC Knee high with 20- 30 mmhg of compression 1 each 10    ONE TOUCH ULTRASOFT LANCETS MISC Check blood sugar  each 12    Blood Glucose Monitoring Suppl (ONE TOUCH ULTRA 2) w/Device KIT Check blood sugar BID 1 kit 0    blood glucose test strips (ASCENSIA AUTODISC VI;ONE TOUCH ULTRA TEST VI) strip Check blood sugar  each 12    Prenatal Vit-Fe Fumarate-FA (PREPLUS) 27-1 MG TABS TAKE ONE TABLET BY MOUTH EVERY DAY      melatonin 3 MG TABS tablet TAKE ONE TABLET BY MOUTH EVERY DAY 30 tablet 3    gabapentin (NEURONTIN) 300 MG capsule Take 300 mg by mouth 3 times daily. .       No current facility-administered medications for this visit. Occupation: retired. Physical demands include: n/a. Status: n/a.      Exercise regimen: 3x a week goes to the  for pool therapy    Hobbies: tennis, riding bike     Patient Goals: pain relief, return to prior activity, get back to normal    Contraindications/Precautions: none    OBJECTIVE:     Estimated body mass index is 30.65 kg/m² as calculated from the following:    Height as of 8/18/21: 6' (1.829 m). Weight as of 8/18/21: 226 lb (102.5 kg). Observations: well nourished male    Inspection: normal orthopedic exam         Range of Motion:    Neck:    Flexion:  [] Normal   [x] Limited by 15 degrees and pain    Extension:  [] Normal   [x] Limited by 15 degrees and pain     Right Rotation: [] Normal   [x] Limited by 15 degrees    Left Rotation:  [] Normal   [x] Limited by 15 degrees   Right Side Bending: [] Normal   [x] Limited by 20 degrees   Left Side Bending: [] Normal   [x] Limited by 15 degrees    Upper Extremity:   Right:   [] Normal   [x] Limited 100 degrees flexion   Left:   [] Normal   [x] Limited 100 degrees flexion    Strength:   Neck: 4/5   R UE: 3-/5   L UE: 3-/5    Palpation: Tender to palpation C6-7 midline. Sensation: N and T down B arms, hands    Special Tests:   [] Nerve Root Compression           Right []+ / [] -    Left []+ / [] -  [] Cervical Distraction []+ / [] -    [] Spurling's Test           Right []+ / [] -    Left []+ / [] -     [] Other: []+ / [] -           ASSESSMENT     Pt to benefit from skilled PT services in order to improve ROM, strength, functional mobility, endurance, and decrease pain in neck.      Outcome Measure:   Neck Disability Index 52% disability     Problems:   Pain 8/10 constant  ROM decreased as noted above   Strength decreased as noted above   Limitations with ADLs as noted above, use of right upper extremity, use of left upper extremity, bending, lifting, carrying    [x] There are no barriers affecting plan of care or recovery    [] Barriers to this patient's plan of care or recovery include:    Domestic Concerns:  [x] No  [] Yes:    Short Term goals (2-3 weeks)   Pain 5/10   ROM WFL   Strength 4+/5 neck    Able to perform / complete the following functions / tasks: ADLs, hobbies, yard work, reach / lift / carry light weighted items in performance of home or work demands, return to exercise regimen  with less pain.  Neck Disability Index 40% disability    Long Term goals (4-6 weeks)   Pain 0-4/10   ROM WNL   Strength 5/5 neck   Able to perform / complete the following functions / tasks: ADLs, hobbies, yard work, reach / lift / carry medium weighted items in performance of home or work demands, return to exercise regimen with no/minimal pain.  Neck Disability Index 0-30% disability   Independent with home exercise program (HEP)    Rehab Potential: [x] Good  [] Fair  [] Poor    PLAN       Treatment Plan:   instruction in home exercise program   therapeutic exercise   therapeutic activity   neuromuscular re-education   manual therapy   cold / hot pack  electrical stimulation   mechanical traction     The following CPT codes are likely to be used in the care of this patient:   00766 PT Evaluation: Moderate Complexity   63205 PT Re-Evaluation   51946 Therapeutic Exercise   76302 Neuromuscular Re-Education   96302 Therapeutic Activities   48117 Manual Therapy   11964 Mechanical Traction    Electrical Stimulation    **Requesting 36 units of Therapeutic Exercise and Therapeutic Activity, 18 units of Traction, E-Stim, NM Re-Ed, and Manual Therapy. Suggested Professional Referral: [x] No  [] Yes:     Patient Education:  [x] Plans / Goals, Risks / Benefits discussed  [x] Home exercise program  Method of Education: [x] Verbal  [x] Demo  [x] Written  Comprehension of Education:  [x] Verbalizes understanding. [x] Demonstrates understanding. [] Needs Review. [] Demonstrates / verbalizes understanding of HEP / [facility] previously given. Frequency:  1-2 days per week for 4-6 weeks    Patient understands diagnosis/prognosis and consents to treatment, plan and goals: [x] Yes    [] No     Thank you for the opportunity to work with your patient.   If you have questions or comments, please contact me at [phone]; fax: [phone]. Electronically signed by: Betty Richards PT         Please sign Physician's Certification and return to: [address] [place] [address]  Dept: [phone]  Dept Fax: [phone] Certification / Comments     Frequency/Duration 1-2 days per week for 4-6 weeks. Certification period from 9/28/2021  to 12/27/2021. I have reviewed the Plan of Care established for skilled therapy services and certify that the services are required and that they will be provided while the patient is under my care.     Physician's Comments/Revisions:               Physician's Printed Name:                                           [de-identified] Signature:                                                               Date:

## 2021-09-28 NOTE — PROGRESS NOTES
Physical Therapy Daily Treatment Note    Date: 2021  Patient Name: Hemanth January  : 1955   MRN: [id number]  DOInjury: 10 years  DOSx: None   Referring Provider: Sonia Kanaris, APRN - CNP  [place]  [address] [place] [address],  [address] [place] [address]     Medical Diagnosis:    Diagnosis Orders   1. Cervical disc disorder with radiculopathy         Outcome Measure: Neck Disability Index: 52% Impaired    S: See eval  O: Pt given HEP  Time 3759-3657     Visit 1     Pain 7/10     ROM      Modalities      MH + ES      Traction NEXT           Manual                  THEREX      UBE  NEXT     Shrugs HEP     Cervical Extension HEP     Cervical Flexion HEP     Cervical Side Bending HEP     Cervical Rotation HEP     Chin Tuck HEP     Chin Tuck with Rotation HEP           ROWS: H NEXT     ROWS: M NEXT     ROWS: L NEXT     Punches       Low obliques with head follow      High obliques with head follow      Shoulder flexion with head follow      Shoulder abduction with head follow            Supine Cervical Flexion       S-L Side Bending       S-L Rotation      Prone Cervical Extension             Seated Cervical Flexion with Scapular Protraction      Seated Cervical Extension with arms behind head      Corner Wall Stretch with Cervical Extension and Scapular Retraction      Wall Pushups with Cervical Extension            Weight Training      Vertical Row      Lat Pull Down      Shoulder Press DB      Shoulder Flexion DB      Shoulder Abduction DB      Shoulder ER DB            A:  Tolerated well. Above added to written HEP.   P: Continue with rehab plan  Kathy Koch PT    Treatment Charges: Mins Units   Initial Evaluation 40 1   Re-Evaluation     Ther Exercise         TE     Manual Therapy     MT     Ther Activities        TA     Gait Training          GT     Neuro Re-education NR     Modalities     Non-Billable Service Time     Other     Total Time/Units 40 1

## 2021-10-04 ENCOUNTER — TREATMENT (OUTPATIENT)
Dept: PHYSICAL THERAPY | Age: 66
End: 2021-10-04
Payer: MEDICAID

## 2021-10-04 DIAGNOSIS — M50.10 CERVICAL DISC DISORDER WITH RADICULOPATHY: Primary | ICD-10-CM

## 2021-10-04 PROCEDURE — 97012 MECHANICAL TRACTION THERAPY: CPT

## 2021-10-04 PROCEDURE — 97110 THERAPEUTIC EXERCISES: CPT

## 2021-10-04 NOTE — PROGRESS NOTES
Physical Therapy Daily Treatment Note    Date: 10/4/2021  Patient Name: Yrn Gonzalez  : 1955   MRN: [id number]  DOInjury: 10 years  DOSx: None     Referring Provider:   MILIND Ignacio CNP[address],  [facility] Diagnosis:    Diagnosis Orders   1. Cervical disc disorder with radiculopathy         Outcome Measure: Neck Disability Index: 52% Impaired    S: Patient reports pain will diminish after HEP but returns after 20 minutes or so. O: Pt given HEP  Time 0762-4045     Visit 2     Pain 7/10     ROM      Modalities      MH + ES Next     Traction 17lbs 15 min  MO         Manual                  THEREX      UBE  3/3  TE   Shrugs HEP     Cervical Extension HEP     Cervical Flexion HEP     Cervical Side Bending HEP     Cervical Rotation HEP     Chin Tuck HEP     Chin Tuck with Rotation HEP           ROWS: H Green 2 x 15  TE   ROWS: M Green 2 x 15  TE   ROWS: L Green 2 x 15  TE   Punches       Low obliques with head follow      High obliques with head follow      Shoulder flexion with head follow      Shoulder abduction with head follow            Supine Cervical Flexion       S-L Side Bending       S-L Rotation      Prone Cervical Extension             Seated Cervical Flexion with Scapular Protraction      Seated Cervical Extension with arms behind head      Corner Wall Stretch with Cervical Extension and Scapular Retraction      Wall Pushups with Cervical Extension            Weight Training      Vertical Row      Lat Pull Down      Shoulder Press DB      Shoulder Flexion DB      Shoulder Abduction DB      Shoulder ER DB            A:  Tolerated well.   P: Continue with rehab plan  Kaelyn Sheridan PTA    Treatment Charges: Mins Units   Initial Evaluation     Re-Evaluation     Ther Exercise         TE 30 2   Manual Therapy     MT     Ther Activities        TA     Gait Training          GT     Neuro Re-education NR     Modalities  Cervical Traction 15 1 Non-Billable Service Time     Other     Total Time/Units 45 3

## 2021-10-06 ENCOUNTER — TREATMENT (OUTPATIENT)
Dept: PHYSICAL THERAPY | Age: 66
End: 2021-10-06
Payer: MEDICAID

## 2021-10-06 DIAGNOSIS — M50.10 CERVICAL DISC DISORDER WITH RADICULOPATHY: Primary | ICD-10-CM

## 2021-10-06 PROCEDURE — 97012 MECHANICAL TRACTION THERAPY: CPT

## 2021-10-06 PROCEDURE — G0283 ELEC STIM OTHER THAN WOUND: HCPCS

## 2021-10-06 PROCEDURE — 97110 THERAPEUTIC EXERCISES: CPT

## 2021-10-06 NOTE — PROGRESS NOTES
Physical Therapy Daily Treatment Note    Date: 10/6/2021  Patient Name: Edwin Ly  : 1955   MRN: [id number]  DOInjury: 10 years  DOSx: None     Referring Provider:   MILIND Aguilar - CNP  [place]  [address] [place] [address],  [facility]            Medical Diagnosis:    Diagnosis Orders   1. Cervical disc disorder with radiculopathy         Outcome Measure: Neck Disability Index: 52% Impaired    S: Patient reports he felt improvements after last session but pain slowly came back that night. O: Pt given HEP  Time 1517-2813     Visit 3     Pain 7/10     ROM      Modalities      MH + ES 15 min upper trap     Traction 17lbs 15 min  MO         Manual                  THEREX      UBE  3/3  TE   Shrugs HEP     Cervical Extension HEP     Cervical Flexion HEP     Cervical Side Bending HEP     Cervical Rotation HEP     Chin Tuck HEP     Chin Tuck with Rotation HEP           ROWS: H Green 2 x 15  TE   ROWS: M Green 2 x 15  TE   ROWS: L Green 2 x 15  TE   Punches       Low obliques with head follow      High obliques with head follow      Shoulder flexion with head follow      Shoulder abduction with head follow            Supine Cervical Flexion       S-L Side Bending       S-L Rotation      Prone Cervical Extension             Seated Cervical Flexion with Scapular Protraction      Seated Cervical Extension with arms behind head      Corner Wall Stretch with Cervical Extension and Scapular Retraction      Wall Pushups with Cervical Extension            Weight Training      Vertical Row      Lat Pull Down      Shoulder Press DB      Shoulder Flexion DB      Shoulder Abduction DB      Shoulder ER DB            A:  Tolerated well with pain relief after modalities.    P: Continue with rehab plan  Nelida Pompa PTA    Treatment Charges: Mins Units   Initial Evaluation     Re-Evaluation     Ther Exercise         TE 30 2   Manual Therapy     MT     Ther Activities        TA     Gait Training          GT Neuro Re-education NR     Modalities 15 min traction  15 min estim 2   Non-Billable Service Time     Other     Total Time/Units 60 4

## 2021-10-11 ENCOUNTER — TREATMENT (OUTPATIENT)
Dept: PHYSICAL THERAPY | Age: 66
End: 2021-10-11
Payer: MEDICAID

## 2021-10-11 DIAGNOSIS — M50.10 CERVICAL DISC DISORDER WITH RADICULOPATHY: Primary | ICD-10-CM

## 2021-10-11 PROCEDURE — 97110 THERAPEUTIC EXERCISES: CPT

## 2021-10-11 PROCEDURE — 97012 MECHANICAL TRACTION THERAPY: CPT

## 2021-10-11 PROCEDURE — G0283 ELEC STIM OTHER THAN WOUND: HCPCS

## 2021-10-11 NOTE — PROGRESS NOTES
Physical Therapy Daily Treatment Note    Date: 10/11/2021  Patient Name: Astrid Bender  : 1955   MRN: [id number]  DOInjury: 10 years  DOSx: None     Referring Provider:   MILIND Mendenhall - CNP  [address],  [facility] Diagnosis:    Diagnosis Orders   1. Cervical disc disorder with radiculopathy         Outcome Measure: Neck Disability Index: 52% Impaired    S: Patient reports pain and headache over weekend. He is also experiencing paresthesia in BUE, right worse than left. O:   Time 1346-7694     Visit 4     Pain 7/10     ROM      Modalities      MH + ES 15 min upper trap     Traction 18lbs 15 min  MO         Manual                  THEREX      UBE  3/3  TE   Shrugs HEP     Cervical Extension HEP     Cervical Flexion HEP     Cervical Side Bending HEP     Cervical Rotation HEP     Chin Tuck HEP     Chin Tuck with Rotation HEP           ROWS: H Green 2 x 15  TE   ROWS: M Green 2 x 15  TE   ROWS: L Green 2 x 15  TE   Punches       Low obliques with head follow      High obliques with head follow      Shoulder flexion with head follow      Shoulder abduction with head follow            Supine Cervical Flexion       S-L Side Bending       S-L Rotation      Prone Cervical Extension             Seated Cervical Flexion with Scapular Protraction      Seated Cervical Extension with arms behind head      Corner Wall Stretch with Cervical Extension and Scapular Retraction      Wall Pushups with Cervical Extension            Weight Training      Vertical Row      Lat Pull Down      Shoulder Press DB      Shoulder Flexion DB      Shoulder Abduction DB      Shoulder ER DB            A:  Tolerated well with pain relief after modalities. Patient's headache diminished as well as paresthesia. Provided HEP and green tubing.    P: Continue with rehab plan  Ranjan Pablo PTA    Treatment Charges: Mins Units   Initial Evaluation     Re-Evaluation     Ther Exercise         TE 30 2   Manual Therapy     MT     Ther Activities        TA     Gait Training          GT     Neuro Re-education NR     Modalities 15 min traction  15 min estim 2   Non-Billable Service Time     Other     Total Time/Units 60 4

## 2021-10-13 ENCOUNTER — TREATMENT (OUTPATIENT)
Dept: PHYSICAL THERAPY | Age: 66
End: 2021-10-13
Payer: MEDICAID

## 2021-10-13 DIAGNOSIS — M50.10 CERVICAL DISC DISORDER WITH RADICULOPATHY: Primary | ICD-10-CM

## 2021-10-13 PROCEDURE — G0283 ELEC STIM OTHER THAN WOUND: HCPCS

## 2021-10-13 PROCEDURE — 97110 THERAPEUTIC EXERCISES: CPT

## 2021-10-13 PROCEDURE — 97012 MECHANICAL TRACTION THERAPY: CPT

## 2021-10-13 NOTE — PROGRESS NOTES
Physical Therapy Daily Treatment Note    Date: 10/13/2021  Patient Name: Trudy Thompson  : 1955   MRN: [id number]  DOInjury: 10 years  DOSx: None     Referring Provider:   MILIND Felix - CNP  [address] [facility],  [facility]         Medical Diagnosis:    Diagnosis Orders   1. Cervical disc disorder with radiculopathy         Outcome Measure: Neck Disability Index: 52% Impaired    S: Patient reports after therapy session Monday, his paresthesia slowly started going away. By Tuesday it was gone. His headache also went away. O:   Time 2354-8703     Visit 5     Pain 7/10     ROM      Modalities      MH + ES 15 min upper trap Cervical and lumbar moist heat    Traction 20lbs 15 min  MO         Manual                  THEREX      UBE  3/3  TE   Shrugs HEP     Cervical Extension HEP     Cervical Flexion HEP     Cervical Side Bending HEP     Cervical Rotation HEP     Chin Tuck HEP     Chin Tuck with Rotation HEP           ROWS: H Green 2 x 15  TE   ROWS: M Green 2 x 15  TE   ROWS: L Green 2 x 15  TE   Punches       Low obliques with head follow      High obliques with head follow      Shoulder flexion with head follow      Shoulder abduction with head follow            Supine Cervical Flexion       S-L Side Bending       S-L Rotation      Prone Cervical Extension             Seated Cervical Flexion with Scapular Protraction      Seated Cervical Extension with arms behind head      Corner Wall Stretch with Cervical Extension and Scapular Retraction      Wall Pushups with Cervical Extension            Weight Training      Vertical Row      Lat Pull Down      Shoulder Press DB      Shoulder Flexion DB      Shoulder Abduction DB      Shoulder ER DB            A: Tolerated well with pain relief after modalities.    P: Continue with rehab plan  Kashmir Noguera PTA    Treatment Charges: Mins Units   Initial Evaluation     Re-Evaluation     Ther Exercise         TE 30 2   Manual Therapy     MT Ther Activities        TA     Gait Training          GT     Neuro Re-education NR     Modalities 15 min traction  15 min estim 2   Non-Billable Service Time     Other     Total Time/Units 60 4

## 2021-10-18 ENCOUNTER — TREATMENT (OUTPATIENT)
Dept: PHYSICAL THERAPY | Age: 66
End: 2021-10-18
Payer: MEDICARE

## 2021-10-18 DIAGNOSIS — M50.10 CERVICAL DISC DISORDER WITH RADICULOPATHY: Primary | ICD-10-CM

## 2021-10-18 PROCEDURE — 97012 MECHANICAL TRACTION THERAPY: CPT

## 2021-10-18 PROCEDURE — G0283 ELEC STIM OTHER THAN WOUND: HCPCS

## 2021-10-18 NOTE — PROGRESS NOTES
Physical Therapy Daily Treatment Note    Date: 10/18/2021  Patient Name: Jermaine Kathleen  : 1955   MRN: [id number]  DOInjury: 10 years  DOSx: None     Referring Provider:   MILIND Grant - CNP  [place]  [address] [place] [address],  [facility] Diagnosis:    Diagnosis Orders   1. Cervical disc disorder with radiculopathy         Outcome Measure: Neck Disability Index: 52% Impaired    S: Patient reports he feels increased stiffness and could be from increased stress. Patient's wife is in hospital for heart issues. O:   Time 6370-0706     Visit 6     Pain 7/10     ROM      Modalities      MH + ES 15 min upper trap Cervical and thoracic moist heat    Traction 20lbs 15 min  MO         Manual                  THEREX      UBE    TE   Shrugs HEP     Cervical Extension HEP     Cervical Flexion HEP     Cervical Side Bending HEP     Cervical Rotation HEP     Chin Tuck HEP     Chin Tuck with Rotation HEP           ROWS: H  TE   ROWS: M  TE   ROWS: L  TE   Punches       Low obliques with head follow      High obliques with head follow      Shoulder flexion with head follow      Shoulder abduction with head follow            Supine Cervical Flexion       S-L Side Bending       S-L Rotation      Prone Cervical Extension             Seated Cervical Flexion with Scapular Protraction      Seated Cervical Extension with arms behind head      Corner Wall Stretch with Cervical Extension and Scapular Retraction      Wall Pushups with Cervical Extension            Weight Training      Vertical Row      Lat Pull Down      Shoulder Press DB      Shoulder Flexion DB      Shoulder Abduction DB      Shoulder ER DB            A: Tolerated well with pain relief after modalities.    P: Continue with rehab plan  Zeeshan Chandler PTA    Treatment Charges: Mins Units   Initial Evaluation     Re-Evaluation     Ther Exercise         TE     Manual Therapy     MT     Ther Activities        TA     Gait Training GT     Neuro Re-education NR     Modalities 15 min traction  15 min estim 2   Non-Billable Service Time     Other 10 0   Total Time/Units 40 2

## 2021-10-20 ENCOUNTER — TREATMENT (OUTPATIENT)
Dept: PHYSICAL THERAPY | Age: 66
End: 2021-10-20
Payer: MEDICARE

## 2021-10-20 ENCOUNTER — OFFICE VISIT (OUTPATIENT)
Dept: FAMILY MEDICINE CLINIC | Age: 66
End: 2021-10-20
Payer: MEDICARE

## 2021-10-20 VITALS
SYSTOLIC BLOOD PRESSURE: 136 MMHG | BODY MASS INDEX: 30.2 KG/M2 | WEIGHT: 223 LBS | HEIGHT: 72 IN | HEART RATE: 57 BPM | RESPIRATION RATE: 20 BRPM | DIASTOLIC BLOOD PRESSURE: 80 MMHG | OXYGEN SATURATION: 98 %

## 2021-10-20 DIAGNOSIS — M54.2 CERVICODYNIA: ICD-10-CM

## 2021-10-20 DIAGNOSIS — M50.10 CERVICAL DISC DISORDER WITH RADICULOPATHY: Primary | ICD-10-CM

## 2021-10-20 DIAGNOSIS — K21.9 GASTROESOPHAGEAL REFLUX DISEASE WITHOUT ESOPHAGITIS: ICD-10-CM

## 2021-10-20 DIAGNOSIS — E11.42 TYPE 2 DIABETES MELLITUS WITH DIABETIC POLYNEUROPATHY, WITHOUT LONG-TERM CURRENT USE OF INSULIN (HCC): Primary | ICD-10-CM

## 2021-10-20 LAB
CREATININE URINE POCT: NORMAL
HBA1C MFR BLD: 7.6 %
MICROALBUMIN/CREAT 24H UR: NORMAL MG/G{CREAT}
MICROALBUMIN/CREAT UR-RTO: NORMAL

## 2021-10-20 PROCEDURE — G0283 ELEC STIM OTHER THAN WOUND: HCPCS

## 2021-10-20 PROCEDURE — 99214 OFFICE O/P EST MOD 30 MIN: CPT | Performed by: FAMILY MEDICINE

## 2021-10-20 PROCEDURE — 3017F COLORECTAL CA SCREEN DOC REV: CPT | Performed by: FAMILY MEDICINE

## 2021-10-20 PROCEDURE — 2022F DILAT RTA XM EVC RTNOPTHY: CPT | Performed by: FAMILY MEDICINE

## 2021-10-20 PROCEDURE — 97012 MECHANICAL TRACTION THERAPY: CPT

## 2021-10-20 PROCEDURE — 82044 UR ALBUMIN SEMIQUANTITATIVE: CPT | Performed by: FAMILY MEDICINE

## 2021-10-20 PROCEDURE — G8427 DOCREV CUR MEDS BY ELIG CLIN: HCPCS | Performed by: FAMILY MEDICINE

## 2021-10-20 PROCEDURE — G8482 FLU IMMUNIZE ORDER/ADMIN: HCPCS | Performed by: FAMILY MEDICINE

## 2021-10-20 PROCEDURE — 83036 HEMOGLOBIN GLYCOSYLATED A1C: CPT | Performed by: FAMILY MEDICINE

## 2021-10-20 PROCEDURE — 1036F TOBACCO NON-USER: CPT | Performed by: FAMILY MEDICINE

## 2021-10-20 PROCEDURE — G8417 CALC BMI ABV UP PARAM F/U: HCPCS | Performed by: FAMILY MEDICINE

## 2021-10-20 PROCEDURE — 3051F HG A1C>EQUAL 7.0%<8.0%: CPT | Performed by: FAMILY MEDICINE

## 2021-10-20 RX ORDER — GLIPIZIDE 5 MG/1
5 TABLET ORAL DAILY
Qty: 30 TABLET | Refills: 5 | Status: SHIPPED
Start: 2021-10-20 | End: 2022-04-07 | Stop reason: SDUPTHER

## 2021-10-20 RX ORDER — OMEPRAZOLE 20 MG/1
CAPSULE, DELAYED RELEASE ORAL
Qty: 30 CAPSULE | Refills: 5 | Status: SHIPPED
Start: 2021-10-20 | End: 2022-04-07 | Stop reason: SDUPTHER

## 2021-10-20 ASSESSMENT — ENCOUNTER SYMPTOMS
DIARRHEA: 0
NAUSEA: 0
VOMITING: 0

## 2021-10-20 NOTE — PATIENT INSTRUCTIONS

## 2021-10-20 NOTE — PROGRESS NOTES
Physical Therapy Daily Treatment Note    Date: 10/20/2021  Patient Name: Flo Garcia  : 1955   MRN: [id number]  DOInjury: 10 years  DOSx: None     Referring Provider:   Singh Stephens, MILIND - QUITA  [address],  [facility]         Medical Diagnosis:    Diagnosis Orders   1. Cervical disc disorder with radiculopathy         Outcome Measure: Neck Disability Index: 52% Impaired    S: Patient reports he feels increased stiffness and could be from increased stress. Patient's wife is in hospital for heart issues. O:   Time 8104-2725     Visit 7     Pain 7/10     ROM      Modalities      MH + ES 15 min upper trap Cervical and thoracic moist heat    Traction 20lbs 15 min  MO         Manual                  THEREX      UBE    TE   Shrugs HEP     Cervical Extension HEP     Cervical Flexion HEP     Cervical Side Bending HEP     Cervical Rotation HEP     Chin Tuck HEP     Chin Tuck with Rotation HEP           ROWS: H  TE   ROWS: M  TE   ROWS: L  TE   Punches       Low obliques with head follow      High obliques with head follow      Shoulder flexion with head follow      Shoulder abduction with head follow            Supine Cervical Flexion       S-L Side Bending       S-L Rotation      Prone Cervical Extension             Seated Cervical Flexion with Scapular Protraction      Seated Cervical Extension with arms behind head      Corner Wall Stretch with Cervical Extension and Scapular Retraction      Wall Pushups with Cervical Extension            Weight Training      Vertical Row      Lat Pull Down      Shoulder Press DB      Shoulder Flexion DB      Shoulder Abduction DB      Shoulder ER DB            A: Tolerated well with pain relief after modalities.    P: Continue with rehab plan  Jeancarlos Eugene, PTA    Treatment Charges: Mins Units   Initial Evaluation     Re-Evaluation     Ther Exercise         TE     Manual Therapy     MT     Ther Activities        TA     Gait Training GT     Neuro Re-education NR     Modalities 15 min traction  15 min estim 2   Non-Billable Service Time     Other 10 0   Total Time/Units 40 2

## 2021-10-20 NOTE — PROGRESS NOTES
OFFICE PROGRESS NOTE      SUBJECTIVE:        Patient ID:   Lisbeth Dye is a 60 y.o. male who presents for   Chief Complaint   Patient presents with    Diabetes         HPI:   Patient is here to follow up on diabetes. Fasting blood sugars:120's Midday blood sugars: 130's. Patient checks blood glucose 2 times per day. Patient is following diabetic diet. Patient is a nonsmoker. Last ophthalmology visit: 1/2021. Patient could not tolerate daily statin. Recent lab results reviewed including CMP, CBC, TSH, and lipid panel which are remarkable for hyperglycemia. Last urine microalbumin: Today    Patient has been having worsening neck pain. Has been seeing neurology recently. Patient had abnormal EMG at neurology office. Prior to Admission medications    Medication Sig Start Date End Date Taking? Authorizing Provider   omeprazole (PRILOSEC) 20 MG delayed release capsule TAKE ONE CAPSULE BY MOUTH EVERY DAY 10/20/21  Yes Lorrie Tello MD   glipiZIDE (GLUCOTROL) 5 MG tablet Take 1 tablet by mouth daily 10/20/21  Yes Lorrie Tello MD   mupirocin (BACTROBAN) 2 % ointment Apply topically 3 times daily.  10/20/21  Yes Lorrie Tello MD   baclofen (LIORESAL) 10 MG tablet Take 1 tablet by mouth 2 times daily 8/18/21  Yes MILIND Abebe - QUITA   glipiZIDE-metFORMIN (METAGLIP) 5-500 MG per tablet Take 1 tablet by mouth 2 times daily (before meals) 7/13/21  Yes Lorrie Tello MD   diclofenac sodium (VOLTAREN) 1 % GEL APPLY 2 GRAMS TOPICALLY TWICE DAILY 6/14/21  Yes Lorrie Tello MD   Elastic Bandages & Supports (JOBST KNEE HIGH COMPRESSION ) MISC Knee high with 20- 30 mmhg of compression 6/3/21  Yes Benedicto Conway MD   ONE TOUCH ULTRASOFT LANCETS MISC Check blood sugar BID 4/14/21  Yes Lorrie Tello MD   Blood Glucose Monitoring Suppl (ONE TOUCH ULTRA 2) w/Device KIT Check blood sugar BID 4/14/21  Yes Lorrie Tello MD blood glucose test strips (ASCENSIA AUTODISC VI;ONE TOUCH ULTRA TEST VI) strip Check blood sugar BID 4/14/21  Yes Chiqui Ingram MD   Prenatal Vit-Fe Fumarate-FA (PREPLUS) 27-1 MG TABS TAKE ONE TABLET BY MOUTH EVERY DAY 12/25/20  Yes Historical Provider, MD   melatonin 3 MG TABS tablet TAKE ONE TABLET BY MOUTH EVERY DAY 4/23/19  Yes Chiqui Ingram MD   gabapentin (NEURONTIN) 300 MG capsule Take 300 mg by mouth 3 times daily. .   Yes Historical Provider, MD     Social History     Socioeconomic History    Marital status:      Spouse name: None    Number of children: None    Years of education: None    Highest education level: None   Occupational History    None   Tobacco Use    Smoking status: Never Smoker    Smokeless tobacco: Never Used   Substance and Sexual Activity    Alcohol use: No    Drug use: No    Sexual activity: Yes     Partners: Female   Other Topics Concern    None   Social History Narrative    None     Social Determinants of Health     Financial Resource Strain:     Difficulty of Paying Living Expenses:    Food Insecurity: No Food Insecurity    Worried About Running Out of Food in the Last Year: Never true    Arlyn of Food in the Last Year: Never true   Transportation Needs:     Lack of Transportation (Medical):      Lack of Transportation (Non-Medical):    Physical Activity:     Days of Exercise per Week:     Minutes of Exercise per Session:    Stress: No Stress Concern Present    Feeling of Stress : Not at all   Social Connections:     Frequency of Communication with Friends and Family:     Frequency of Social Gatherings with Friends and Family:     Attends Church Services:     Active Member of Clubs or Organizations:     Attends Club or Organization Meetings:     Marital Status:    Intimate Partner Violence:     Fear of Current or Ex-Partner:     Emotionally Abused:     Physically Abused:     Sexually Abused:        I have reviewed Nakia's allergies, medications, problem list, medical, social and family history and have updated as needed in the electronic medical record    Current Outpatient Medications   Medication Sig Dispense Refill    omeprazole (PRILOSEC) 20 MG delayed release capsule TAKE ONE CAPSULE BY MOUTH EVERY DAY 30 capsule 5    glipiZIDE (GLUCOTROL) 5 MG tablet Take 1 tablet by mouth daily 30 tablet 5    mupirocin (BACTROBAN) 2 % ointment Apply topically 3 times daily. 22 g 5    baclofen (LIORESAL) 10 MG tablet Take 1 tablet by mouth 2 times daily 60 tablet 3    glipiZIDE-metFORMIN (METAGLIP) 5-500 MG per tablet Take 1 tablet by mouth 2 times daily (before meals) 60 tablet 5    diclofenac sodium (VOLTAREN) 1 % GEL APPLY 2 GRAMS TOPICALLY TWICE DAILY 100 g 5    Elastic Bandages & Supports (JOBST KNEE HIGH COMPRESSION SM) MISC Knee high with 20- 30 mmhg of compression 1 each 10    ONE TOUCH ULTRASOFT LANCETS MISC Check blood sugar  each 12    Blood Glucose Monitoring Suppl (ONE TOUCH ULTRA 2) w/Device KIT Check blood sugar BID 1 kit 0    blood glucose test strips (ASCENSIA AUTODISC VI;ONE TOUCH ULTRA TEST VI) strip Check blood sugar  each 12    Prenatal Vit-Fe Fumarate-FA (PREPLUS) 27-1 MG TABS TAKE ONE TABLET BY MOUTH EVERY DAY      melatonin 3 MG TABS tablet TAKE ONE TABLET BY MOUTH EVERY DAY 30 tablet 3    gabapentin (NEURONTIN) 300 MG capsule Take 300 mg by mouth 3 times daily. .       No current facility-administered medications for this visit. Review Of Systems:    Review of Systems   Eyes: Negative for visual disturbance. Respiratory: Stridor: with exertion. Cardiovascular: Negative for chest pain, palpitations and leg swelling. Gastrointestinal: Negative for diarrhea, nausea and vomiting. Genitourinary: Negative for difficulty urinating, dysuria and frequency. Skin: Negative for rash. Psychiatric/Behavioral: Negative for dysphoric mood. The patient is nervous/anxious. mupirocin (BACTROBAN) 2 % ointment; Apply topically 3 times daily. BMI was elevated today, and weight loss plan recommended is : conventional weight loss. Phone/MyChart follow up if tests abnormal.    Return in about 3 months (around 1/20/2022) for diabetes. I have reviewed my findings and recommendations with Roman Cox.     Alyson Gautam MD, M.D

## 2021-10-25 ENCOUNTER — TREATMENT (OUTPATIENT)
Dept: PHYSICAL THERAPY | Age: 66
End: 2021-10-25
Payer: MEDICAID

## 2021-10-25 DIAGNOSIS — M50.10 CERVICAL DISC DISORDER WITH RADICULOPATHY: Primary | ICD-10-CM

## 2021-10-25 PROCEDURE — 97110 THERAPEUTIC EXERCISES: CPT

## 2021-10-25 PROCEDURE — 97012 MECHANICAL TRACTION THERAPY: CPT

## 2021-10-25 PROCEDURE — G0283 ELEC STIM OTHER THAN WOUND: HCPCS

## 2021-10-25 NOTE — PROGRESS NOTES
Physical Therapy Daily Treatment Note    Date: 10/25/2021  Patient Name: Yrn Gonzalez  : 1955   MRN: [id number]  DOInjury: 10 years  DOSx: None     Referring Provider:   Tenna Jointer, APRN - CNP  [place]  [address] [place] [address],  [facility]         Medical Diagnosis:    Diagnosis Orders   1. Cervical disc disorder with radiculopathy         Outcome Measure: Neck Disability Index: 52% Impaired    S: Patient reports less tightness but some pain today. Patient was moving her daughter over weekend where he is sore from that. O:   Time 4595-4583     Visit 8     Pain 7/10     ROM      Modalities      MH + ES 15 min upper trap Cervical and thoracic moist heat    Traction 20lbs 15 min  MO         Manual                  THEREX      UBE  3/3  TE   Shrugs HEP     Cervical Extension HEP     Cervical Flexion HEP     Cervical Side Bending HEP     Cervical Rotation HEP     Chin Tuck HEP     Chin Tuck with Rotation HEP           ROWS: H Green 2 x 15  TE   ROWS: M Green 2 x 15  TE   ROWS: L Green 2 x 15  TE   Punches       Low obliques with head follow      High obliques with head follow      Shoulder flexion with head follow      Shoulder abduction with head follow            Supine Cervical Flexion       S-L Side Bending       S-L Rotation      Prone Cervical Extension             Seated Cervical Flexion with Scapular Protraction      Seated Cervical Extension with arms behind head      Corner Wall Stretch with Cervical Extension and Scapular Retraction      Wall Pushups with Cervical Extension            Weight Training      Vertical Row      Lat Pull Down      Shoulder Press DB      Shoulder Flexion DB      Shoulder Abduction DB      Shoulder ER DB            A: Tolerated well with pain relief after modalities.    P: Continue with rehab plan  Kaelyn Sheridan PTA    Treatment Charges: Mins Units   Initial Evaluation     Re-Evaluation     Ther Exercise         TE 30 2   Manual Therapy     MT     Ther Activities        TA     Gait Training          GT     Neuro Re-education NR     Modalities 15 min traction  15 min estim 2   Non-Billable Service Time     Other     Total Time/Units 60 4

## 2021-10-29 ENCOUNTER — TREATMENT (OUTPATIENT)
Dept: PHYSICAL THERAPY | Age: 66
End: 2021-10-29
Payer: MEDICAID

## 2021-10-29 DIAGNOSIS — M50.10 CERVICAL DISC DISORDER WITH RADICULOPATHY: Primary | ICD-10-CM

## 2021-10-29 PROCEDURE — 97110 THERAPEUTIC EXERCISES: CPT

## 2021-10-29 PROCEDURE — G0283 ELEC STIM OTHER THAN WOUND: HCPCS

## 2021-10-29 PROCEDURE — 97012 MECHANICAL TRACTION THERAPY: CPT

## 2021-10-29 NOTE — PROGRESS NOTES
Modalities 15 min traction  15 min estim 2   Non-Billable Service Time     Other     Total Time/Units 60 4

## 2021-11-02 ENCOUNTER — TREATMENT (OUTPATIENT)
Dept: PHYSICAL THERAPY | Age: 66
End: 2021-11-02
Payer: MEDICAID

## 2021-11-02 DIAGNOSIS — M50.10 CERVICAL DISC DISORDER WITH RADICULOPATHY: Primary | ICD-10-CM

## 2021-11-02 PROCEDURE — 97012 MECHANICAL TRACTION THERAPY: CPT

## 2021-11-02 PROCEDURE — G0283 ELEC STIM OTHER THAN WOUND: HCPCS

## 2021-11-02 PROCEDURE — 97110 THERAPEUTIC EXERCISES: CPT

## 2021-11-02 NOTE — PROGRESS NOTES
Physical Therapy Daily Treatment Note    Date: 2021  Patient Name: Kasi Millan  : 1955   MRN: [id number]  DOInjury: 10 years  DOSx: None     Referring Provider:   MILIND Epstein - CNP  [address],  [facility]         Medical Diagnosis:    Diagnosis Orders   1. Cervical disc disorder with radiculopathy         Outcome Measure: Neck Disability Index: 52% Impaired    S: Patient reports he had a headache yesterday from multiple phone calls. O:   Time 0346-0822     Visit 10     Pain 6-7/10     ROM      Modalities      MH + ES 15 min upper trap Cervical and thoracic moist heat    Traction 20lbs 15 min  MO         Manual                  THEREX      UBE  3/3  TE   Shrugs HEP     Cervical Extension HEP     Cervical Flexion HEP     Cervical Side Bending HEP     Cervical Rotation HEP     Chin Tuck HEP     Chin Tuck with Rotation HEP           ROWS: H Green 2 x 15  TE   ROWS: M Green 2 x 15  TE   ROWS: L Green 2 x 15  TE   Punches       Low obliques with head follow      High obliques with head follow      Shoulder flexion with head follow      Shoulder abduction with head follow            Supine Cervical Flexion       S-L Side Bending       S-L Rotation      Prone Cervical Extension             Seated Cervical Flexion with Scapular Protraction      Seated Cervical Extension with arms behind head      Corner Wall Stretch with Cervical Extension and Scapular Retraction      Wall Pushups with Cervical Extension            Weight Training      Vertical Row      Lat Pull Down      Shoulder Press DB      Shoulder Flexion DB      Shoulder Abduction DB      Shoulder ER DB            A: Tolerated well with pain relief after modalities. P: Continue with rehab plan. Patient's last visit will be .   Joel Adam PTA    Treatment Charges: Mins Units   Initial Evaluation     Re-Evaluation     Ther Exercise         TE 30 2   Manual Therapy     MT     Ther Activities        TA Gait Training          GT     Neuro Re-education NR     Modalities 15 min traction  15 min estim 2   Non-Billable Service Time     Other     Total Time/Units 60 4

## 2021-11-05 ENCOUNTER — TREATMENT (OUTPATIENT)
Dept: PHYSICAL THERAPY | Age: 66
End: 2021-11-05
Payer: MEDICAID

## 2021-11-05 DIAGNOSIS — M50.10 CERVICAL DISC DISORDER WITH RADICULOPATHY: Primary | ICD-10-CM

## 2021-11-05 PROCEDURE — G0283 ELEC STIM OTHER THAN WOUND: HCPCS

## 2021-11-05 PROCEDURE — 97110 THERAPEUTIC EXERCISES: CPT

## 2021-11-05 PROCEDURE — 97012 MECHANICAL TRACTION THERAPY: CPT

## 2021-11-05 NOTE — PROGRESS NOTES
Physical Therapy Daily Treatment Note    Date: 2021  Patient Name: Carmen Wall  : 1955   MRN: [id number]  DOInjury: 10 years  DOSx: None     Referring Provider:   MILIND Hernandez - CNP  [facility] [address]  [facility],  [facility] Diagnosis:    Diagnosis Orders   1. Cervical disc disorder with radiculopathy         Outcome Measure: Neck Disability Index: 52% Impaired    S: Patient reports feeling good. O:   Time 9438-0436     Visit 11     Pain 6-7/10     ROM      Modalities      MH + ES 15 min upper trap Cervical and thoracic moist heat    Traction 20lbs 15 min  MO         Manual                  THEREX      UBE  3/3  TE   Shrugs HEP     Cervical Extension HEP     Cervical Flexion HEP     Cervical Side Bending HEP     Cervical Rotation HEP     Chin Tuck HEP     Chin Tuck with Rotation HEP           ROWS: H Blue 2 x 15  TE   ROWS: M Blue 2 x 15  TE   ROWS: L Blue 2 x 15  TE   Punches       Low obliques with head follow      High obliques with head follow      Shoulder flexion with head follow      Shoulder abduction with head follow            Supine Cervical Flexion       S-L Side Bending       S-L Rotation      Prone Cervical Extension             Seated Cervical Flexion with Scapular Protraction      Seated Cervical Extension with arms behind head      Corner Wall Stretch with Cervical Extension and Scapular Retraction      Wall Pushups with Cervical Extension            Weight Training      Vertical Row      Lat Pull Down      Shoulder Press DB      Shoulder Flexion DB      Shoulder Abduction DB      Shoulder ER DB            A: Tolerated well with pain relief after modalities. P: Continue with rehab plan. Patient's last visit will be .   Manjula Wells PTA    Treatment Charges: Mins Units   Initial Evaluation     Re-Evaluation     Ther Exercise         TE 30 2   Manual Therapy     MT     Ther Activities        TA     Gait Training          GT     Neuro Re-education NR     Modalities 15 min traction  15 min estim 2   Non-Billable Service Time     Other     Total Time/Units 60 4

## 2021-11-10 ENCOUNTER — TREATMENT (OUTPATIENT)
Dept: PHYSICAL THERAPY | Age: 66
End: 2021-11-10
Payer: MEDICAID

## 2021-11-10 DIAGNOSIS — M50.10 CERVICAL DISC DISORDER WITH RADICULOPATHY: Primary | ICD-10-CM

## 2021-11-10 PROCEDURE — 97012 MECHANICAL TRACTION THERAPY: CPT

## 2021-11-10 PROCEDURE — G0283 ELEC STIM OTHER THAN WOUND: HCPCS

## 2021-11-10 PROCEDURE — 97110 THERAPEUTIC EXERCISES: CPT

## 2021-11-10 NOTE — PROGRESS NOTES
Physical Therapy Daily Treatment Note    Date: 11/10/2021  Patient Name: Carmen Wall  : 1955   MRN: [id number]  DOInjury: 10 years  DOSx: None     Referring Provider:   MILIND Hernandez - CNP  [place] [address]  [place],  [facility] Diagnosis:    Diagnosis Orders   1. Cervical disc disorder with radiculopathy         Outcome Measure: Neck Disability Index: 52% Impaired    S: Patient reports pain yesterday but feels better now after he went to gym and did exercises. O:   Time 5133-3009     Visit 12     Pain 3/10     ROM      Modalities      MH + ES 15 min upper trap Cervical and thoracic moist heat    Traction 20lbs 15 min  MO         Manual                  THEREX      UBE  3/3  TE   Shrugs HEP     Cervical Extension HEP     Cervical Flexion HEP     Cervical Side Bending HEP     Cervical Rotation HEP     Chin Tuck HEP     Chin Tuck with Rotation HEP           ROWS: H Blue 2 x 15  TE   ROWS: M Blue 2 x 15  TE   ROWS: L Blue 2 x 15  TE   Punches       Low obliques with head follow      High obliques with head follow      Shoulder flexion with head follow      Shoulder abduction with head follow            Supine Cervical Flexion       S-L Side Bending       S-L Rotation      Prone Cervical Extension             Seated Cervical Flexion with Scapular Protraction      Seated Cervical Extension with arms behind head      Corner Wall Stretch with Cervical Extension and Scapular Retraction      Wall Pushups with Cervical Extension            Weight Training      Vertical Row      Lat Pull Down      Shoulder Press DB      Shoulder Flexion DB      Shoulder Abduction DB      Shoulder ER DB            A: Tolerated well with pain relief after modalities. P: Continue with rehab plan. Patient's last visit will be .   Mimi Boyle PTA    Treatment Charges: Mins Units   Initial Evaluation     Re-Evaluation     Ther Exercise         TE 30 2   Manual Therapy     MT     Ther Activities        TA     Gait Training          GT     Neuro Re-education NR     Modalities 15 min traction  15 min estim 2   Non-Billable Service Time     Other     Total Time/Units 60 4

## 2021-11-12 ENCOUNTER — TREATMENT (OUTPATIENT)
Dept: PHYSICAL THERAPY | Age: 66
End: 2021-11-12
Payer: MEDICARE

## 2021-11-12 DIAGNOSIS — M50.10 CERVICAL DISC DISORDER WITH RADICULOPATHY: Primary | ICD-10-CM

## 2021-11-12 PROCEDURE — 97110 THERAPEUTIC EXERCISES: CPT

## 2021-11-12 PROCEDURE — 97012 MECHANICAL TRACTION THERAPY: CPT

## 2021-11-12 PROCEDURE — G0283 ELEC STIM OTHER THAN WOUND: HCPCS

## 2021-11-18 ENCOUNTER — OFFICE VISIT (OUTPATIENT)
Dept: NEUROLOGY | Age: 66
End: 2021-11-18
Payer: MEDICARE

## 2021-11-18 ENCOUNTER — TELEPHONE (OUTPATIENT)
Dept: NEUROLOGY | Age: 66
End: 2021-11-18

## 2021-11-18 VITALS
BODY MASS INDEX: 30.34 KG/M2 | DIASTOLIC BLOOD PRESSURE: 87 MMHG | OXYGEN SATURATION: 97 % | WEIGHT: 224 LBS | HEIGHT: 72 IN | SYSTOLIC BLOOD PRESSURE: 150 MMHG | TEMPERATURE: 98.3 F | HEART RATE: 62 BPM

## 2021-11-18 DIAGNOSIS — R20.2 PARESTHESIA AND PAIN OF BOTH UPPER EXTREMITIES: Primary | ICD-10-CM

## 2021-11-18 DIAGNOSIS — M54.2 NECK PAIN: ICD-10-CM

## 2021-11-18 DIAGNOSIS — G62.9 NEUROPATHY: ICD-10-CM

## 2021-11-18 DIAGNOSIS — M79.602 PARESTHESIA AND PAIN OF BOTH UPPER EXTREMITIES: Primary | ICD-10-CM

## 2021-11-18 DIAGNOSIS — M79.601 PARESTHESIA AND PAIN OF BOTH UPPER EXTREMITIES: Primary | ICD-10-CM

## 2021-11-18 PROCEDURE — G8417 CALC BMI ABV UP PARAM F/U: HCPCS | Performed by: NURSE PRACTITIONER

## 2021-11-18 PROCEDURE — 1036F TOBACCO NON-USER: CPT | Performed by: NURSE PRACTITIONER

## 2021-11-18 PROCEDURE — 4040F PNEUMOC VAC/ADMIN/RCVD: CPT | Performed by: NURSE PRACTITIONER

## 2021-11-18 PROCEDURE — 3017F COLORECTAL CA SCREEN DOC REV: CPT | Performed by: NURSE PRACTITIONER

## 2021-11-18 PROCEDURE — G8427 DOCREV CUR MEDS BY ELIG CLIN: HCPCS | Performed by: NURSE PRACTITIONER

## 2021-11-18 PROCEDURE — G8482 FLU IMMUNIZE ORDER/ADMIN: HCPCS | Performed by: NURSE PRACTITIONER

## 2021-11-18 PROCEDURE — 1123F ACP DISCUSS/DSCN MKR DOCD: CPT | Performed by: NURSE PRACTITIONER

## 2021-11-18 PROCEDURE — 99214 OFFICE O/P EST MOD 30 MIN: CPT | Performed by: NURSE PRACTITIONER

## 2021-11-18 NOTE — TELEPHONE ENCOUNTER
No prior auth needed for MRI C spine. Per Miss Nyasia Deluca. Case # [id number].   Electronically signed by Gamaliel Velazquez MA on 11/18/21 at 3:26 PM EST

## 2021-11-18 NOTE — PROGRESS NOTES
[address] [facility]. Maritza Ojeda M.D., F.A.C.P. Matt Brown, DNP, APRN, ACNS-BC  Chi Roman. Nato Denson, MSN, APRN-FNP-C  Yuki Douglas, MSN, APRN-FNP-C  RACHELL Javed, PA-C  Betty Schuster, MSN, APRN-FNP-C  [address] [address]  [place], [address] [place]  Phone: [phone]  Fax: [phone]       Elsa Christie is a 73 y.o. left handed male     Patient follows for BLE neuropathy    Onset was a month ago and it started out with his left foot and now both feet. He has feeling of numbness and pain intermittently throughout the day. He has sharp shooting pains in his calves at times. It feels like walking on cardboard. It is aggravated by activity. He has adjusted his diet and no longer eats red meat as he thought this would benefit and alleviate the symptoms. It is relieved by rest but it can still be painful even while resting. His foot doctor prescribed gabapentin which works periodically. He is not currently on gabapentin. He was also given a steroid shot in his left foot which relieved the pain for 3-4 days but the pain came back and stayed. He has a history of left metatarsal release > 10 years ago. He walks and workouts out 3 days a week. He was diagnosed in 2006 with diabetes and started taking diabetes medications in 2008. EMG results of BLE suggest motor radiculopathy bilaterally L5 level. Complaints of neck pain which has gotten worse making and continues to make it hard for him to turn his head side to side. He did have a MRI of his C spine back in 2014 which showed multiple levels of stenosis and disc protrusion. He notes pain radiating into both of his shoulders from his neck. He does have paresthesias in his wrists bilaterally but believes it may be from carpal tunnel. He has had PT in the past for his cervical pain and injections.     Ordered MRI C-spine at last visit which was denied patient still complaining of neck pain and tension type headaches. Patient asked for neck brace. Has neuropathy in his BLE has gotten a little better but was recently diagnosed with DM. He continues to swim workout every day. Today patient notes that he continues to have neck pain with numbness/tingling going down into his bilateral arms. He believes that this neck pain and neuropathy in his arms is getting worse. He would like to see if the MRI C-spine can be reordered to make sure he has no abnormality such as bulging disc causing him pain.     No issues with chewing or swallowing  No chest pain or palpitations  No SOB  No vertigo, lightheadedness or loss of consciousness  No falls, tripping or stumbling  + numbness and tingling of BLE   No focal arm/leg weakness    ROS is otherwise negative    Objective:     Vitals:    11/18/21 1344   BP: (!) 150/87   Site: Right Upper Arm   Pulse: 62   Temp: 98.3 °F (36.8 °C)   SpO2: 97%   Weight: 224 lb (101.6 kg)   Height: 6' (1.829 m)     General appearance: alert, appears stated age, cooperative and in no distress  Head: normocephalic, without obvious abnormality, atraumatic  Eyes: conjunctivae/corneas clear; no drainage  Neck:  symmetrical, trachea midline, limited ROM especially when turning head left and right, pain elicited with cervical spine palpated from levels C 1-7  Lungs: clear to auscultation bilaterally  Heart: regular rate and rhythm, S1, S2 normal  Abdomen: soft, non-tender; bowel sounds normal  Extremities: normal, atraumatic, no cyanosis or edema  Skin:  color, texture, turgor normal--no rashes or lesions      Mental Status: alert and oriented x 4    Appropriate attention/concentration  Intact fundus of knowledge  Repetition intact  Memories intact    Speech: no dysarthria  Language: no aphasias    Cranial Nerves:  I: smell    II: visual acuity     II: visual fields Full    II: pupils ANNAAMRIA   III,VII: ptosis None   III,IV,VI: extraocular muscles  EOMI without nystagmus   V: mastication Normal   V: facial light touch sensation  Normal   V,VII: corneal reflex     VII: facial muscle function - upper  Normal   VII: facial muscle function - lower Normal   VIII: hearing Normal   IX: soft palate elevation  Normal   IX,X: gag reflex    XI: trapezius strength  5/5   XI: sternocleidomastoid strength 5/5   XI: neck extension strength  5/5   XII: tongue strength  Normal     Motor:  5/5 throughout  Normal bulk and tone  No drift   No abnormal movements    Sensory:  LT normal  Decreased PP in C5-7     Coordination:   FN, FFM and XAVIER normal    Gait:  Normal    DTR:   2+ throughout     Laboratory/Radiology:  ry/Radiology:     No recent labs or imaging studies to review at this time    Assessment:     Neuropathy of BLE  --- history of DM and left metatarsal release  --- exam with decreased PP and vibrations at ankles bilaterally  --- EMG suggests L5 radiculopathy bilaterally into legs  --- Newly diagnosed DM with an A1c of 8.0    Cervical pain with radiculopathy into shoulders bilaterally   --- history of cervical pain with last MRI C spine done in 2014 with abnormalities found: disc protrusions and stenosis at multiple cervical levels  --- Previous exam showed decreased PP at C 5-7 levels   --- We will reorder MRI C-spine    Plan:     Patient would like to have cervical spine evaluated due to increased neck pain and radiculopathy into his shoulders.      MRI C-spine ordered    Continue baclofen 10 mg twice daily    Follow up in 3 months     Call with any questions or concerns      MILIND Medina CNP  1:46 PM  11/18/2021

## 2021-12-14 ENCOUNTER — HOSPITAL ENCOUNTER (OUTPATIENT)
Dept: MRI IMAGING | Age: 66
Discharge: HOME | End: 2021-12-16
Payer: MEDICARE

## 2021-12-14 DIAGNOSIS — M79.601 PARESTHESIA AND PAIN OF BOTH UPPER EXTREMITIES: ICD-10-CM

## 2021-12-14 DIAGNOSIS — M79.602 PARESTHESIA AND PAIN OF BOTH UPPER EXTREMITIES: ICD-10-CM

## 2021-12-14 DIAGNOSIS — R20.2 PARESTHESIA AND PAIN OF BOTH UPPER EXTREMITIES: ICD-10-CM

## 2021-12-14 PROCEDURE — 72141 MRI NECK SPINE W/O DYE: CPT

## 2021-12-15 ENCOUNTER — TELEPHONE (OUTPATIENT)
Dept: NEUROLOGY | Age: 66
End: 2021-12-15

## 2021-12-15 DIAGNOSIS — M50.10 CERVICAL DISC DISORDER WITH RADICULOPATHY: Primary | ICD-10-CM

## 2021-12-15 RX ORDER — BACLOFEN 10 MG/1
TABLET ORAL
Qty: 60 TABLET | Refills: 0 | Status: SHIPPED
Start: 2021-12-15 | End: 2022-02-10 | Stop reason: ALTCHOICE

## 2022-01-24 ENCOUNTER — OFFICE VISIT (OUTPATIENT)
Dept: FAMILY MEDICINE CLINIC | Age: 67
End: 2022-01-24
Payer: MEDICARE

## 2022-01-24 VITALS
OXYGEN SATURATION: 96 % | HEIGHT: 72 IN | BODY MASS INDEX: 30.07 KG/M2 | HEART RATE: 65 BPM | RESPIRATION RATE: 20 BRPM | SYSTOLIC BLOOD PRESSURE: 124 MMHG | DIASTOLIC BLOOD PRESSURE: 78 MMHG | WEIGHT: 222 LBS

## 2022-01-24 DIAGNOSIS — B18.2 CHRONIC HEPATITIS C WITHOUT HEPATIC COMA (HCC): ICD-10-CM

## 2022-01-24 DIAGNOSIS — Z12.5 PROSTATE CANCER SCREENING: ICD-10-CM

## 2022-01-24 DIAGNOSIS — E11.42 TYPE 2 DIABETES MELLITUS WITH DIABETIC POLYNEUROPATHY, WITHOUT LONG-TERM CURRENT USE OF INSULIN (HCC): Primary | ICD-10-CM

## 2022-01-24 LAB — HBA1C MFR BLD: 7.1 %

## 2022-01-24 PROCEDURE — 1123F ACP DISCUSS/DSCN MKR DOCD: CPT | Performed by: FAMILY MEDICINE

## 2022-01-24 PROCEDURE — 3051F HG A1C>EQUAL 7.0%<8.0%: CPT | Performed by: FAMILY MEDICINE

## 2022-01-24 PROCEDURE — G8427 DOCREV CUR MEDS BY ELIG CLIN: HCPCS | Performed by: FAMILY MEDICINE

## 2022-01-24 PROCEDURE — 99214 OFFICE O/P EST MOD 30 MIN: CPT | Performed by: FAMILY MEDICINE

## 2022-01-24 PROCEDURE — 3017F COLORECTAL CA SCREEN DOC REV: CPT | Performed by: FAMILY MEDICINE

## 2022-01-24 PROCEDURE — 4040F PNEUMOC VAC/ADMIN/RCVD: CPT | Performed by: FAMILY MEDICINE

## 2022-01-24 PROCEDURE — G8482 FLU IMMUNIZE ORDER/ADMIN: HCPCS | Performed by: FAMILY MEDICINE

## 2022-01-24 PROCEDURE — 83036 HEMOGLOBIN GLYCOSYLATED A1C: CPT | Performed by: FAMILY MEDICINE

## 2022-01-24 PROCEDURE — 1036F TOBACCO NON-USER: CPT | Performed by: FAMILY MEDICINE

## 2022-01-24 PROCEDURE — G8417 CALC BMI ABV UP PARAM F/U: HCPCS | Performed by: FAMILY MEDICINE

## 2022-01-24 PROCEDURE — 2022F DILAT RTA XM EVC RTNOPTHY: CPT | Performed by: FAMILY MEDICINE

## 2022-01-24 RX ORDER — GLIPIZIDE AND METFORMIN HCL 5; 500 MG/1; MG/1
1 TABLET, FILM COATED ORAL
Qty: 60 TABLET | Refills: 5 | Status: SHIPPED
Start: 2022-01-24 | End: 2022-04-08 | Stop reason: DRUGHIGH

## 2022-01-24 ASSESSMENT — ENCOUNTER SYMPTOMS
VOMITING: 0
DIARRHEA: 0
NAUSEA: 0
SHORTNESS OF BREATH: 0

## 2022-01-24 ASSESSMENT — PATIENT HEALTH QUESTIONNAIRE - PHQ9
SUM OF ALL RESPONSES TO PHQ QUESTIONS 1-9: 1
SUM OF ALL RESPONSES TO PHQ9 QUESTIONS 1 & 2: 1
1. LITTLE INTEREST OR PLEASURE IN DOING THINGS: 1
SUM OF ALL RESPONSES TO PHQ QUESTIONS 1-9: 1
2. FEELING DOWN, DEPRESSED OR HOPELESS: 0
SUM OF ALL RESPONSES TO PHQ QUESTIONS 1-9: 1
SUM OF ALL RESPONSES TO PHQ QUESTIONS 1-9: 1

## 2022-01-24 NOTE — PROGRESS NOTES
OFFICE PROGRESS NOTE      SUBJECTIVE:        Patient ID:   Johanna Burgos is a 73 y.o. male whopresents for   Chief Complaint   Patient presents with    Diabetes           HPI:   Patient is here to follow up on diabetes. Fasting blood sugars:110's Midday blood sugars: 110's. Patient checks blood glucose 1-2 times per day. Patient is following diabetic diet. Patient is a nonsmoker. Last ophthalmology visit: 1/2021--scheduled for next week. Patient unable to tolerate daily statin. Last urine microalbumin: 10/2021    Patient has history of hepatitis C. Follows with GI. Prior to Admission medications    Medication Sig Start Date End Date Taking? Authorizing Provider   mupirocin (BACTROBAN) 2 % ointment Apply topically 3 times daily.  1/24/22  Yes Jacob Quintana MD   glipiZIDE-metFORMIN (METAGLIP) 5-500 MG per tablet Take 1 tablet by mouth 2 times daily (before meals) 1/24/22  Yes Jacob Quintana MD   baclofen (LIORESAL) 10 MG tablet TAKE ONE TABLET BY MOUTH TWO TIMES DAILY 12/15/21  Yes MILIND Iglesias - CNP   diclofenac sodium (VOLTAREN) 1 % GEL APPLY 2 GRAMS TOPICALLY TWICE DAILY  11/1/21  Yes Jacob Quintana MD   omeprazole (PRILOSEC) 20 MG delayed release capsule TAKE ONE CAPSULE BY MOUTH EVERY DAY 10/20/21  Yes Jacob Quintana MD   glipiZIDE (GLUCOTROL) 5 MG tablet Take 1 tablet by mouth daily 10/20/21  Yes Jacob Quintana MD   Elastic Bandages & Supports (JOBST KNEE HIGH COMPRESSION SM) MISC Knee high with 20- 30 mmhg of compression 6/3/21  Yes Ruby Varma MD   ONE TOUCH ULTRASOFT LANCETS MISC Check blood sugar BID 4/14/21  Yes Jacob Quintana MD   Blood Glucose Monitoring Suppl (ONE TOUCH ULTRA 2) w/Device KIT Check blood sugar BID 4/14/21  Yes Jacob Quintana MD   blood glucose test strips (ASCENSIA AUTODISC VI;ONE TOUCH ULTRA TEST VI) strip Check blood sugar BID 4/14/21  Yes Jacob Quintana MD Prenatal Vit-Fe Fumarate-FA (PREPLUS) 27-1 MG TABS TAKE ONE TABLET BY MOUTH EVERY DAY 12/25/20  Yes Historical Provider, MD   melatonin 3 MG TABS tablet TAKE ONE TABLET BY MOUTH EVERY DAY 4/23/19  Yes Matthew White MD   gabapentin (NEURONTIN) 300 MG capsule Take 300 mg by mouth 3 times daily. .   Yes Historical Provider, MD     Social History     Socioeconomic History    Marital status:      Spouse name: None    Number of children: None    Years of education: None    Highest education level: None   Occupational History    None   Tobacco Use    Smoking status: Never Smoker    Smokeless tobacco: Never Used   Substance and Sexual Activity    Alcohol use: No    Drug use: No    Sexual activity: Yes     Partners: Female   Other Topics Concern    None   Social History Narrative    None     Social Determinants of Health     Financial Resource Strain:     Difficulty of Paying Living Expenses: Not on file   Food Insecurity: No Food Insecurity    Worried About Running Out of Food in the Last Year: Never true    Arlyn of Food in the Last Year: Never true   Transportation Needs:     Lack of Transportation (Medical): Not on file    Lack of Transportation (Non-Medical):  Not on file   Physical Activity:     Days of Exercise per Week: Not on file    Minutes of Exercise per Session: Not on file   Stress: No Stress Concern Present    Feeling of Stress : Not at all   Social Connections:     Frequency of Communication with Friends and Family: Not on file    Frequency of Social Gatherings with Friends and Family: Not on file    Attends Synagogue Services: Not on file    Active Member of Clubs or Organizations: Not on file    Attends Club or Organization Meetings: Not on file    Marital Status: Not on file   Intimate Partner Violence:     Fear of Current or Ex-Partner: Not on file    Emotionally Abused: Not on file    Physically Abused: Not on file    Sexually Abused: Not on file Housing Stability:     Unable to Pay for Housing in the Last Year: Not on file    Number of Places Lived in the Last Year: Not on file    Unstable Housing in the Last Year: Not on file       I have reviewed Nakia's allergies, medications, problem list, medical, social and family history and have updated as needed in the electronic medical record    Current Outpatient Medications   Medication Sig Dispense Refill    mupirocin (BACTROBAN) 2 % ointment Apply topically 3 times daily. 22 g 5    glipiZIDE-metFORMIN (METAGLIP) 5-500 MG per tablet Take 1 tablet by mouth 2 times daily (before meals) 60 tablet 5    baclofen (LIORESAL) 10 MG tablet TAKE ONE TABLET BY MOUTH TWO TIMES DAILY 60 tablet 0    diclofenac sodium (VOLTAREN) 1 % GEL APPLY 2 GRAMS TOPICALLY TWICE DAILY  100 g 5    omeprazole (PRILOSEC) 20 MG delayed release capsule TAKE ONE CAPSULE BY MOUTH EVERY DAY 30 capsule 5    glipiZIDE (GLUCOTROL) 5 MG tablet Take 1 tablet by mouth daily 30 tablet 5    Elastic Bandages & Supports (JOBST KNEE HIGH COMPRESSION SM) MISC Knee high with 20- 30 mmhg of compression 1 each 10    ONE TOUCH ULTRASOFT LANCETS MISC Check blood sugar  each 12    Blood Glucose Monitoring Suppl (ONE TOUCH ULTRA 2) w/Device KIT Check blood sugar BID 1 kit 0    blood glucose test strips (ASCENSIA AUTODISC VI;ONE TOUCH ULTRA TEST VI) strip Check blood sugar  each 12    Prenatal Vit-Fe Fumarate-FA (PREPLUS) 27-1 MG TABS TAKE ONE TABLET BY MOUTH EVERY DAY      melatonin 3 MG TABS tablet TAKE ONE TABLET BY MOUTH EVERY DAY 30 tablet 3    gabapentin (NEURONTIN) 300 MG capsule Take 300 mg by mouth 3 times daily. .       No current facility-administered medications for this visit. Review Of Systems:    Review of Systems   Eyes: Negative for visual disturbance. Respiratory: Negative for shortness of breath. Cardiovascular: Negative for chest pain, palpitations and leg swelling.    Gastrointestinal: Negative for diarrhea, nausea and vomiting. Genitourinary: Positive for dysuria (comes and goes). Negative for difficulty urinating and frequency. Skin: Negative for rash. Psychiatric/Behavioral: Negative for dysphoric mood. OBJECTIVE:     VS:  Wt Readings from Last 3 Encounters:   01/24/22 222 lb (100.7 kg)   11/18/21 224 lb (101.6 kg)   10/20/21 223 lb (101.2 kg)     Vitals:    01/24/22 1347   BP: 124/78   Pulse: 65   Resp: 20   SpO2: 96%       Physical Exam  Vitals reviewed. Constitutional:       General: He is not in acute distress. Appearance: He is well-developed. Neck:      Vascular: No carotid bruit. Cardiovascular:      Rate and Rhythm: Normal rate and regular rhythm. Heart sounds: Normal heart sounds. No murmur heard. No gallop. Pulmonary:      Effort: Pulmonary effort is normal.      Breath sounds: Normal breath sounds. No wheezing or rales. Abdominal:      General: Bowel sounds are normal. There is no distension. Palpations: Abdomen is soft. Tenderness: There is no abdominal tenderness. Musculoskeletal:      Cervical back: Neck supple. Right lower leg: No edema. Left lower leg: No edema. Skin:     General: Skin is warm and dry. Neurological:      Mental Status: He is alert and oriented to person, place, and time. Results for orders placed or performed in visit on 01/24/22   POCT glycosylated hemoglobin (Hb A1C)   Result Value Ref Range    Hemoglobin A1C 7.1 %         Nakia was seen today for diabetes. Diagnoses and all orders for this visit:    Type 2 diabetes mellitus with diabetic polyneuropathy, without long-term current use of insulin ([facility])  -     POCT glycosylated hemoglobin (Hb A1C)  -     CBC; Future  -     Comprehensive Metabolic Panel; Future  -     Lipid Panel; Future  -     TSH without Reflex; Future  -     glipiZIDE-metFORMIN (METAGLIP) 5-500 MG per tablet;  Take 1 tablet by mouth 2 times daily (before meals)  -     Diabetic Foot Exam        -     Glipizide 5 mg daily for management    Chronic hepatitis C without hepatic coma (HCC)        -     Stable; will assess yearly    Prostate cancer screening  -     PSA screening; Future    Other orders  -     mupirocin (BACTROBAN) 2 % ointment; Apply topically 3 times daily. BMI was elevated today, and weight loss plan recommended is : conventional weight loss. Phone/MyChart follow up if tests abnormal.    Return in about 3 months (around 4/24/2022) for diabetes. I have reviewed my findings and recommendations with Telma Wright.     Irlanda Desai MD, M.D

## 2022-01-24 NOTE — PATIENT INSTRUCTIONS
the plate format. Talk to your doctor, a dietitian, or a diabetes educator about your concerns. Carbohydrate counting  With carbohydrate counting, you plan meals based on the amount of carbohydrate in each food. Carbohydrate raises blood sugar higher and more quickly than any other nutrient. It is found in desserts, breads and cereals, and fruit. It's also found in starchy vegetables such as potatoes and corn, grains such as rice and pasta, and milk and yogurt. Spreading carbohydrate throughout the day helps keep your blood sugar levels within your target range. Your daily amount depends on several things, including your weight, how active you are, which diabetes medicines you take, and what your goals are for your blood sugar levels. A registered dietitian or diabetes educator can help you plan how much carbohydrate to include in each meal and snack. A guideline for your daily amount of carbohydrate is:  · 45 to 60 grams at each meal. That's about the same as 3 to 4 carbohydrate servings. · 15 to 20 grams at each snack. That's about the same as 1 carbohydrate serving. The Nutrition Facts label on packaged foods tells you how much carbohydrate is in a serving of the food. First, look at the serving size on the food label. Is that the amount you eat in a serving? All of the nutrition information on a food label is based on that serving size. So if you eat more or less than that, you'll need to adjust the other numbers. Total carbohydrate is the next thing you need to look for on the label. If you count carbohydrate servings, one serving of carbohydrate is 15 grams. For foods that don't come with labels, such as fresh fruits and vegetables, you'll need a guide that lists carbohydrate in these foods. Ask your doctor, dietitian, or diabetes educator about books or other nutrition guides you can use.   If you take insulin, you need to know how many grams of carbohydrate are in a meal. This lets you know how much rapid-acting insulin to take before you eat. If you use an insulin pump, you get a constant rate of insulin during the day. So the pump must be programmed at meals to give you extra insulin to cover the rise in blood sugar after meals. When you know how much carbohydrate you will eat, you can take the right amount of insulin. Or, if you always use the same amount of insulin, you need to make sure that you eat the same amount of carbohydrate at meals. If you need more help to understand carbohydrate counting and food labels, ask your doctor, dietitian, or diabetes educator. How can you plan healthy meals? Here are some tips to get started:  · Plan your meals a week at a time. Don't forget to include snacks too. · Use cookbooks or online recipes to plan several main meals. Plan some quick meals for busy nights. You also can double some recipes that freeze well. Then you can save half for other busy nights when you don't have time to cook. · Make sure you have the ingredients you need for your recipes. If you're running low on basic items, put these items on your shopping list too. · List foods that you use to make breakfasts, lunches, and snacks. List plenty of fruits and vegetables. · Post this list on the refrigerator. Add to it as you think of more things you need. · Take the list to the store to do your weekly shopping. Follow-up care is a key part of your treatment and safety. Be sure to make and go to all appointments, and call your doctor if you are having problems. It's also a good idea to know your test results and keep a list of the medicines you take. Where can you learn more? Go to [email or web address] org and sign in to your [employer] account. Enter H629 in the [employer] box to learn more about \"Learning About Meal Planning for Diabetes. \"     If you do not have an account, please click on the \"Sign Up Now\" link.   Current as of: September 8, 2021               Content Version: 13.1  © 5503-1107 Healthwise, Incorporated. Care instructions adapted under license by [facility] ([facility]). If you have questions about a medical condition or this instruction, always ask your healthcare professional. [address] any warranty or liability for your use of this information.

## 2022-01-26 LAB — DIABETIC RETINOPATHY: NEGATIVE

## 2022-02-08 ENCOUNTER — INITIAL CONSULT (OUTPATIENT)
Dept: NEUROSURGERY | Age: 67
End: 2022-02-08
Payer: COMMERCIAL

## 2022-02-08 VITALS
WEIGHT: 222 LBS | HEIGHT: 72 IN | OXYGEN SATURATION: 95 % | HEART RATE: 58 BPM | TEMPERATURE: 97.9 F | BODY MASS INDEX: 30.07 KG/M2 | RESPIRATION RATE: 20 BRPM | SYSTOLIC BLOOD PRESSURE: 150 MMHG | DIASTOLIC BLOOD PRESSURE: 78 MMHG

## 2022-02-08 DIAGNOSIS — M54.2 NECK PAIN: Primary | ICD-10-CM

## 2022-02-08 PROCEDURE — G8417 CALC BMI ABV UP PARAM F/U: HCPCS | Performed by: NEUROLOGICAL SURGERY

## 2022-02-08 PROCEDURE — 4040F PNEUMOC VAC/ADMIN/RCVD: CPT | Performed by: NEUROLOGICAL SURGERY

## 2022-02-08 PROCEDURE — G8427 DOCREV CUR MEDS BY ELIG CLIN: HCPCS | Performed by: NEUROLOGICAL SURGERY

## 2022-02-08 PROCEDURE — 1036F TOBACCO NON-USER: CPT | Performed by: NEUROLOGICAL SURGERY

## 2022-02-08 PROCEDURE — 99204 OFFICE O/P NEW MOD 45 MIN: CPT | Performed by: NEUROLOGICAL SURGERY

## 2022-02-08 PROCEDURE — G8482 FLU IMMUNIZE ORDER/ADMIN: HCPCS | Performed by: NEUROLOGICAL SURGERY

## 2022-02-08 PROCEDURE — 3017F COLORECTAL CA SCREEN DOC REV: CPT | Performed by: NEUROLOGICAL SURGERY

## 2022-02-08 PROCEDURE — 1123F ACP DISCUSS/DSCN MKR DOCD: CPT | Performed by: NEUROLOGICAL SURGERY

## 2022-02-08 ASSESSMENT — ENCOUNTER SYMPTOMS
CONSTIPATION: 1
ALLERGIC/IMMUNOLOGIC NEGATIVE: 1
RESPIRATORY NEGATIVE: 1
EYES NEGATIVE: 1

## 2022-02-08 NOTE — PROGRESS NOTES
David Waters (:  1955) is a 73 y.o. male,New patient, here for evaluation of the following chief complaint(s):  Neck Pain (neck pain comes and goes, pt has had PT in [place] with Dr Rufus Diego)         ASSESSMENT/PLAN:  1. Neck pain  73year old male who presents with neck pain. His MRI shows bilateral foraminal stenosis from C4-C7. I am recommending continued conservative therapy. If he fails this, he will need a C4-C5, C5-C6 and C6-C7 anterior cervical diskectomy and fusion    No follow-ups on file. Subjective   SUBJECTIVE/OBJECTIVE:  HPI  73year old male who presents with neck pain. He has had neck pain for a total of about 10 years on and off. He has tried physical therapy. In the past, he had epidural steroid injections. The pain is described as stiffness and aching. The pain radiates into both shoulder with numbness and tingling in his arms. The pain is rated as a 5/10 on average. He admits to weakness in his hands. He has also tried muscle relaxants and membrane stabilizers. Review of Systems   Constitutional: Negative. HENT: Negative. Eyes: Negative. Respiratory: Negative. Cardiovascular: Negative. Gastrointestinal: Positive for constipation. Endocrine: Negative. Genitourinary: Negative. Musculoskeletal: Positive for arthralgias, neck pain and neck stiffness. Skin: Negative. Allergic/Immunologic: Negative. Neurological: Positive for headaches. Hematological: Bruises/bleeds easily. Psychiatric/Behavioral: Negative. Objective   Physical Exam  Vitals reviewed. Constitutional:       General: He is not in acute distress. Appearance: Normal appearance. He is normal weight. He is not ill-appearing, toxic-appearing or diaphoretic. HENT:      Head: Normocephalic and atraumatic. Nose: Nose normal.   Eyes:      General: No visual field deficit or scleral icterus. Right eye: No discharge. Left eye: No discharge. Extraocular Movements: Extraocular movements intact. Conjunctiva/sclera: Conjunctivae normal.      Pupils: Pupils are equal, round, and reactive to light. Pulmonary:      Effort: Pulmonary effort is normal.   Abdominal:      General: Abdomen is flat. There is no distension. Musculoskeletal:         General: No swelling, tenderness, deformity or signs of injury. Normal range of motion. Right lower leg: No edema. Left lower leg: No edema. Skin:     General: Skin is warm and dry. Capillary Refill: Capillary refill takes less than 2 seconds. Coloration: Skin is not jaundiced or pale. Findings: No bruising, erythema, lesion or rash. Neurological:      General: No focal deficit present. Mental Status: He is alert. GCS: GCS eye subscore is 4. GCS verbal subscore is 5. GCS motor subscore is 6. Cranial Nerves: Cranial nerves are intact. No cranial nerve deficit, dysarthria or facial asymmetry. Sensory: Sensation is intact. No sensory deficit. Motor: Weakness present. No tremor, atrophy, abnormal muscle tone, seizure activity or pronator drift. Coordination: Romberg sign negative. Coordination normal. Finger-Nose-Finger Test and Heel to [facility] Test normal. Rapid alternating movements normal.      Gait: Gait normal.      Deep Tendon Reflexes: Reflexes normal. Babinski sign absent on the left side. Reflex Scores:       Tricep reflexes are 2+ on the right side and 2+ on the left side. Bicep reflexes are 2+ on the right side and 2+ on the left side. Brachioradialis reflexes are 2+ on the right side and 2+ on the left side. Patellar reflexes are 2+ on the right side and 2+ on the left side. Achilles reflexes are 2+ on the right side and 2+ on the left side. Comments: 4/5 in LUE   Psychiatric:         Mood and Affect: Mood normal.         Behavior: Behavior normal.         Thought Content:  Thought content normal.         Judgment: Judgment normal.            On this date 2/8/2022 I have spent 45 minutes reviewing previous notes, test results and face to face with the patient discussing the diagnosis and importance of compliance with the treatment plan as well as documenting on the day of the visit. An electronic signature was used to authenticate this note.     --Cameron Morales MD

## 2022-02-10 ENCOUNTER — OFFICE VISIT (OUTPATIENT)
Dept: NEUROLOGY | Age: 67
End: 2022-02-10
Payer: COMMERCIAL

## 2022-02-10 VITALS
WEIGHT: 220 LBS | OXYGEN SATURATION: 99 % | BODY MASS INDEX: 29.8 KG/M2 | DIASTOLIC BLOOD PRESSURE: 72 MMHG | HEIGHT: 72 IN | TEMPERATURE: 97.7 F | HEART RATE: 60 BPM | SYSTOLIC BLOOD PRESSURE: 139 MMHG

## 2022-02-10 DIAGNOSIS — G62.9 NEUROPATHY: Primary | ICD-10-CM

## 2022-02-10 DIAGNOSIS — M50.10 CERVICAL DISC DISORDER WITH RADICULOPATHY: ICD-10-CM

## 2022-02-10 PROCEDURE — G8482 FLU IMMUNIZE ORDER/ADMIN: HCPCS | Performed by: NURSE PRACTITIONER

## 2022-02-10 PROCEDURE — 99213 OFFICE O/P EST LOW 20 MIN: CPT | Performed by: NURSE PRACTITIONER

## 2022-02-10 PROCEDURE — 1123F ACP DISCUSS/DSCN MKR DOCD: CPT | Performed by: NURSE PRACTITIONER

## 2022-02-10 PROCEDURE — G8417 CALC BMI ABV UP PARAM F/U: HCPCS | Performed by: NURSE PRACTITIONER

## 2022-02-10 PROCEDURE — 3017F COLORECTAL CA SCREEN DOC REV: CPT | Performed by: NURSE PRACTITIONER

## 2022-02-10 PROCEDURE — 1036F TOBACCO NON-USER: CPT | Performed by: NURSE PRACTITIONER

## 2022-02-10 PROCEDURE — G8427 DOCREV CUR MEDS BY ELIG CLIN: HCPCS | Performed by: NURSE PRACTITIONER

## 2022-02-10 PROCEDURE — 4040F PNEUMOC VAC/ADMIN/RCVD: CPT | Performed by: NURSE PRACTITIONER

## 2022-02-10 NOTE — PROGRESS NOTES
[address] [facility]. Case Linares M.D., F.A.C.P. Tiffany Henderson, DNP, APRN, ACNS-BC  [facility]. Mane Amaya, MSN, APRN-FNP-C  Tory Carrington, MSN, APRN-FNP-C  RACHELL Wevaer, PA-C  Brian Mai, MSN, APRN-FNP-C  [address] [address]  [place], [address]  Phone: [phone]  Fax: [phone]       Prema Simmons is a 73 y.o. left handed male     Patient follows for BLE neuropathy and cervical radiculopathy     Onset several months ago and it started out with his left foot to both feet. He has feeling of numbness and pain intermittently throughout the day. He has sharp shooting pains in his calves at times. It feels like walking on cardboard. It is aggravated by activity. He has adjusted his diet and no longer eats red meat as he thought this would benefit and alleviate the symptoms. It is relieved by rest but it can still be painful even while resting. His foot doctor prescribed gabapentin which works periodically. He is not currently on gabapentin. He was also given a steroid shot in his left foot which relieved the pain for 3-4 days but the pain came back and stayed. He has a history of left metatarsal release > 10 years ago. He walks and workouts out 3 days a week. He was diagnosed in 2006 with diabetes and started taking diabetes medications in 2008. EMG results of BLE suggest motor radiculopathy bilaterally L5 level. Complaints of neck pain which has gotten worse making and continues to make it hard for him to turn his head side to side. He did have a MRI of his C spine back in 2014 which showed multiple levels of stenosis and disc protrusion. He notes pain radiating into both of his shoulders from his neck. He does have paresthesias in his wrists bilaterally but believes it may be from carpal tunnel. He has had PT in the past for his cervical pain and injections.     MRI C spine was completed and showed severe stenosis at many cervical levels. He was referred to Merlinda Poot. NSGY recommends surgery if conservative methods fail. Today patient notes continue neck pain and trying to no have neck surgery. He will either try chiropractor or PT for 12 sessions. He is upset that his health insurance has changed this year.       No issues with chewing or swallowing  No chest pain or palpitations  No SOB  No vertigo, lightheadedness or loss of consciousness  No falls, tripping or stumbling  + numbness and tingling of BLE   No focal arm/leg weakness    ROS is otherwise negative    Objective:     Vitals:    02/10/22 1441   BP: 139/72   Site: Right Upper Arm   Pulse: 60   Temp: 97.7 °F (36.5 °C)   SpO2: 99%   Weight: 220 lb (99.8 kg)   Height: 6' (1.829 m)     General appearance: alert, appears stated age, cooperative and in no distress  Head: normocephalic, without obvious abnormality, atraumatic  Eyes: conjunctivae/corneas clear; no drainage  Neck:  symmetrical, trachea midline, limited ROM   Lungs: clear to auscultation bilaterally  Heart: regular rate and rhythm, S1, S2 normal  Abdomen: soft, non-tender; bowel sounds normal  Extremities: normal, atraumatic, no cyanosis or edema  Skin:  color, texture, turgor normal--no rashes or lesions      Mental Status: alert and oriented x 4    Appropriate attention/concentration  Intact fundus of knowledge  Repetition intact  Memories intact    Speech: no dysarthria  Language: no aphasias    Cranial Nerves:  I: smell    II: visual acuity     II: visual fields Full    II: pupils ANNAMARIA   III,VII: ptosis None   III,IV,VI: extraocular muscles  EOMI without nystagmus   V: mastication Normal   V: facial light touch sensation  Normal   V,VII: corneal reflex     VII: facial muscle function - upper  Normal   VII: facial muscle function - lower Normal   VIII: hearing Normal   IX: soft palate elevation  Normal   IX,X: gag reflex    XI: trapezius strength  5/5   XI: sternocleidomastoid strength 5/5   XI: neck extension strength  5/5   XII: tongue strength  Normal     Motor:  5/5 throughout  Normal bulk and tone  No drift   No abnormal movements    Sensory:  LT normal    Coordination:   FN, FFM and XAVIER normal    Gait:  Normal    DTR:   2+ throughout     Laboratory/Radiology:  ry/Radiology:     MRI C spine: 1.  No fracture or bony destructive lesion. 2. Degenerative changes.  Mild central canal stenoses at C4-5, C5-6 and C6-7. 3.  Multilevel neural foraminal stenoses, worst (severe) at the right C5-6 level.  Moderate to severe stenosis at the left C6-7 level. 4. Stable atypical hemangioma in the T1 vertebral body.     Imaging studies reviewed independently at this time    Assessment:     Neuropathy of BLE  --- history of DM and left metatarsal release  --- exam with decreased PP and vibrations at ankles bilaterally  --- EMG suggests L5 radiculopathy bilaterally into legs  --- Newly diagnosed DM with an A1c of 8.0    Cervical pain with radiculopathy into shoulders bilaterally   --- history of cervical pain with last MRI C spine done in 2014 with abnormalities found: disc protrusions and stenosis at multiple cervical levels  --- Previous exam showed decreased PP at C 5-7 levels   --- MRI C spine with severe right C5-6 level stenosis    Plan:     Follow up as needed     Call with any questions or concerns      MILIND Coreas - CNP  2:44 PM  2/10/2022

## 2022-03-14 ENCOUNTER — TELEPHONE (OUTPATIENT)
Dept: FAMILY MEDICINE CLINIC | Age: 67
End: 2022-03-14

## 2022-03-14 NOTE — TELEPHONE ENCOUNTER
----- Message from Jostinjacquelinedevante VillaltaChristy sent at 3/14/2022 12:41 PM EDT -----  Subject: Message to Provider    QUESTIONS  Information for Provider? Rosa Isela Jaime, from [facility], called to inform provider that   per guidelines and due to pt having diabetes it is recommended that they   are re-evaluated and prescribed a statin that is more appropriate   ---------------------------------------------------------------------------  --------------  CALL BACK INFO  What is the best way for the office to contact you? OK to leave message on   voicemail  Preferred Call Back Phone Number? [phone]  ---------------------------------------------------------------------------  --------------  SCRIPT ANSWERS  Relationship to Patient? Third Party  Representative Name?  Rosa Isela LEAL AND [facility])

## 2022-03-24 ENCOUNTER — HOSPITAL ENCOUNTER (OUTPATIENT)
Age: 67
Discharge: HOME | End: 2022-03-24
Payer: MEDICARE

## 2022-03-24 DIAGNOSIS — Z12.5 PROSTATE CANCER SCREENING: ICD-10-CM

## 2022-03-24 DIAGNOSIS — E11.42 TYPE 2 DIABETES MELLITUS WITH DIABETIC POLYNEUROPATHY, WITHOUT LONG-TERM CURRENT USE OF INSULIN (HCC): ICD-10-CM

## 2022-03-24 LAB
ALBUMIN SERPL-MCNC: 4.6 G/DL (ref 3.5–5.2)
ALP BLD-CCNC: 82 U/L (ref 40–129)
ALT SERPL-CCNC: 47 U/L (ref 0–40)
ANION GAP SERPL CALCULATED.3IONS-SCNC: 10 MMOL/L (ref 7–16)
AST SERPL-CCNC: 31 U/L (ref 0–39)
BILIRUB SERPL-MCNC: 0.4 MG/DL (ref 0–1.2)
BUN BLDV-MCNC: 19 MG/DL (ref 6–23)
CALCIUM SERPL-MCNC: 9.7 MG/DL (ref 8.6–10.2)
CHLORIDE BLD-SCNC: 99 MMOL/L (ref 98–107)
CHOLESTEROL, TOTAL: 192 MG/DL (ref 0–199)
CO2: 26 MMOL/L (ref 22–29)
CREAT SERPL-MCNC: 1.1 MG/DL (ref 0.7–1.2)
GFR AFRICAN AMERICAN: >60
GFR NON-AFRICAN AMERICAN: >60 ML/MIN/1.73
GLUCOSE BLD-MCNC: 188 MG/DL (ref 74–99)
HCT VFR BLD CALC: 45 % (ref 37–54)
HDLC SERPL-MCNC: 39 MG/DL
HEMOGLOBIN: 15.1 G/DL (ref 12.5–16.5)
LDL CHOLESTEROL CALCULATED: 114 MG/DL (ref 0–99)
MCH RBC QN AUTO: 31.4 PG (ref 26–35)
MCHC RBC AUTO-ENTMCNC: 33.6 % (ref 32–34.5)
MCV RBC AUTO: 93.6 FL (ref 80–99.9)
PDW BLD-RTO: 11.5 FL (ref 11.5–15)
PLATELET # BLD: 247 E9/L (ref 130–450)
PMV BLD AUTO: 10.5 FL (ref 7–12)
POTASSIUM SERPL-SCNC: 4.3 MMOL/L (ref 3.5–5)
PROSTATE SPECIFIC ANTIGEN: 4.2 NG/ML (ref 0–4)
RBC # BLD: 4.81 E12/L (ref 3.8–5.8)
SODIUM BLD-SCNC: 135 MMOL/L (ref 132–146)
TOTAL PROTEIN: 7.6 G/DL (ref 6.4–8.3)
TRIGL SERPL-MCNC: 195 MG/DL (ref 0–149)
TSH SERPL DL<=0.05 MIU/L-ACNC: 1.32 UIU/ML (ref 0.27–4.2)
VLDLC SERPL CALC-MCNC: 39 MG/DL
WBC # BLD: 6.3 E9/L (ref 4.5–11.5)

## 2022-03-24 PROCEDURE — 36415 COLL VENOUS BLD VENIPUNCTURE: CPT

## 2022-03-24 PROCEDURE — G0103 PSA SCREENING: HCPCS

## 2022-03-24 PROCEDURE — 84443 ASSAY THYROID STIM HORMONE: CPT

## 2022-03-24 PROCEDURE — 85027 COMPLETE CBC AUTOMATED: CPT

## 2022-03-24 PROCEDURE — 80053 COMPREHEN METABOLIC PANEL: CPT

## 2022-03-24 PROCEDURE — 80061 LIPID PANEL: CPT

## 2022-04-07 ENCOUNTER — OFFICE VISIT (OUTPATIENT)
Dept: FAMILY MEDICINE CLINIC | Age: 67
End: 2022-04-07
Payer: MEDICARE

## 2022-04-07 VITALS
BODY MASS INDEX: 30.2 KG/M2 | DIASTOLIC BLOOD PRESSURE: 74 MMHG | OXYGEN SATURATION: 97 % | WEIGHT: 223 LBS | HEART RATE: 68 BPM | HEIGHT: 72 IN | SYSTOLIC BLOOD PRESSURE: 132 MMHG | RESPIRATION RATE: 20 BRPM

## 2022-04-07 DIAGNOSIS — K21.9 GASTROESOPHAGEAL REFLUX DISEASE WITHOUT ESOPHAGITIS: ICD-10-CM

## 2022-04-07 DIAGNOSIS — M25.561 ACUTE PAIN OF RIGHT KNEE: ICD-10-CM

## 2022-04-07 DIAGNOSIS — E11.42 TYPE 2 DIABETES MELLITUS WITH DIABETIC POLYNEUROPATHY, WITHOUT LONG-TERM CURRENT USE OF INSULIN (HCC): Primary | ICD-10-CM

## 2022-04-07 LAB — HBA1C MFR BLD: 6.5 %

## 2022-04-07 PROCEDURE — 4040F PNEUMOC VAC/ADMIN/RCVD: CPT | Performed by: FAMILY MEDICINE

## 2022-04-07 PROCEDURE — 1123F ACP DISCUSS/DSCN MKR DOCD: CPT | Performed by: FAMILY MEDICINE

## 2022-04-07 PROCEDURE — 2022F DILAT RTA XM EVC RTNOPTHY: CPT | Performed by: FAMILY MEDICINE

## 2022-04-07 PROCEDURE — 3044F HG A1C LEVEL LT 7.0%: CPT | Performed by: FAMILY MEDICINE

## 2022-04-07 PROCEDURE — 99214 OFFICE O/P EST MOD 30 MIN: CPT | Performed by: FAMILY MEDICINE

## 2022-04-07 PROCEDURE — 1036F TOBACCO NON-USER: CPT | Performed by: FAMILY MEDICINE

## 2022-04-07 PROCEDURE — G8417 CALC BMI ABV UP PARAM F/U: HCPCS | Performed by: FAMILY MEDICINE

## 2022-04-07 PROCEDURE — G8427 DOCREV CUR MEDS BY ELIG CLIN: HCPCS | Performed by: FAMILY MEDICINE

## 2022-04-07 PROCEDURE — 3017F COLORECTAL CA SCREEN DOC REV: CPT | Performed by: FAMILY MEDICINE

## 2022-04-07 RX ORDER — GLIPIZIDE 5 MG/1
5 TABLET ORAL DAILY
Qty: 30 TABLET | Refills: 5 | Status: SHIPPED
Start: 2022-04-07 | End: 2022-07-11 | Stop reason: SDUPTHER

## 2022-04-07 RX ORDER — OMEPRAZOLE 20 MG/1
CAPSULE, DELAYED RELEASE ORAL
Qty: 30 CAPSULE | Refills: 5 | Status: SHIPPED
Start: 2022-04-07 | End: 2022-10-17 | Stop reason: SDUPTHER

## 2022-04-07 ASSESSMENT — ENCOUNTER SYMPTOMS
NAUSEA: 0
DIARRHEA: 0
SHORTNESS OF BREATH: 1
VOMITING: 0

## 2022-04-07 NOTE — PROGRESS NOTES
OFFICE PROGRESS NOTE      SUBJECTIVE:        Patient ID:   Radha Domínguez is a 73 y.o. male who presents for   Chief Complaint   Patient presents with    Diabetes       HPI:   Patient is here to follow up on diabetes. Fasting blood sugars:104-108 Midday blood sugars: up to 120's. Patient checks blood glucose 1-2 times per day. Patient is following diabetic diet. Patient is a nonsmoker. Last ophthalmology visit: 1/22. Patient is unable to tolerate daily statin. Recent lab results reviewed including CMP, CBC, TSH, and lipid panel which are remarkable for hyperglycemia. Last urine microalbumin: 10/2021    Patient has had right knee pain with descending stairs. Wants a more secure sleeve compression brace for his    Patient expressed concern about his disability case. States now he is having trouble focusing and staying concentrated. Prior to Admission medications    Medication Sig Start Date End Date Taking? Authorizing Provider   glipiZIDE-metFORMIN (METAGLIP) 5-500 MG per tablet Take 1 tablet by mouth 2 times daily (before meals) 4/8/22  Yes Keisha Garcia MD   Misc. Devices MISC Right knee compression brace 4/7/22  Yes Keisha Garcia MD   omeprazole (PRILOSEC) 20 MG delayed release capsule TAKE ONE CAPSULE BY MOUTH EVERY DAY 4/7/22  Yes Keisha Garcia MD   glipiZIDE (GLUCOTROL) 5 MG tablet Take 1 tablet by mouth daily 4/7/22  Yes Keisha Garcia MD   blood glucose test strips (ASCENSIA AUTODISC VI;ONE TOUCH ULTRA TEST VI) strip Check blood sugar BID 2/25/22  Yes Keisha Garcia MD   mupirocin (BACTROBAN) 2 % ointment Apply topically 3 times daily.  1/24/22  Yes Keisha Garcia MD   diclofenac sodium (VOLTAREN) 1 % GEL APPLY 2 GRAMS TOPICALLY TWICE DAILY  11/1/21  Yes Keisha Garcia MD   Elastic Bandages & Supports (JOBST KNEE HIGH COMPRESSION ) MISC Knee high with 20- 30 mmhg of compression 6/3/21  Yes Divina Virk Not on file    Attends Club or Organization Meetings: Not on file    Marital Status: Not on file   Intimate Partner Violence:     Fear of Current or Ex-Partner: Not on file    Emotionally Abused: Not on file    Physically Abused: Not on file    Sexually Abused: Not on file   Housing Stability:     Unable to Pay for Housing in the Last Year: Not on file    Number of [place] in the Last Year: Not on file    Unstable Housing in the Last Year: Not on file       I have reviewed Nakia's allergies, medications, problem list, medical, social and family history and have updated as needed in the electronic medical record    Current Outpatient Medications   Medication Sig Dispense Refill    glipiZIDE-metFORMIN (METAGLIP) 5-500 MG per tablet Take 1 tablet by mouth 2 times daily (before meals) 60 tablet 5    Misc. Devices MISC Right knee compression brace 1 each 0    omeprazole (PRILOSEC) 20 MG delayed release capsule TAKE ONE CAPSULE BY MOUTH EVERY DAY 30 capsule 5    glipiZIDE (GLUCOTROL) 5 MG tablet Take 1 tablet by mouth daily 30 tablet 5    blood glucose test strips (ASCENSIA AUTODISC VI;ONE TOUCH ULTRA TEST VI) strip Check blood sugar  each 12    mupirocin (BACTROBAN) 2 % ointment Apply topically 3 times daily. 22 g 5    diclofenac sodium (VOLTAREN) 1 % GEL APPLY 2 GRAMS TOPICALLY TWICE DAILY  100 g 5    Elastic Bandages & Supports (JOBST KNEE HIGH COMPRESSION SM) MISC Knee high with 20- 30 mmhg of compression 1 each 10    ONE TOUCH ULTRASOFT LANCETS MISC Check blood sugar  each 12    Blood Glucose Monitoring Suppl (ONE TOUCH ULTRA 2) w/Device KIT Check blood sugar BID 1 kit 0    Prenatal Vit-Fe Fumarate-FA (PREPLUS) 27-1 MG TABS TAKE ONE TABLET BY MOUTH EVERY DAY      melatonin 3 MG TABS tablet TAKE ONE TABLET BY MOUTH EVERY DAY 30 tablet 3    gabapentin (NEURONTIN) 300 MG capsule Take 300 mg by mouth 3 times daily. .       No current facility-administered medications for this visit. Review Of Systems:    Review of Systems   Eyes: Negative for visual disturbance. Respiratory: Positive for shortness of breath (with exertion). Cardiovascular: Negative for chest pain, palpitations and leg swelling. Gastrointestinal: Negative for diarrhea, nausea and vomiting. Genitourinary: Negative for difficulty urinating, dysuria and frequency. Musculoskeletal: Positive for arthralgias (right knee) and neck pain. Skin: Negative for rash. Psychiatric/Behavioral: Negative for dysphoric mood. OBJECTIVE:     VS:  Wt Readings from Last 3 Encounters:   04/07/22 223 lb (101.2 kg)   02/10/22 220 lb (99.8 kg)   02/08/22 222 lb (100.7 kg)     Vitals:    04/07/22 1459   BP: 132/74   Pulse: 68   Resp: 20   SpO2: 97%       Physical Exam  Vitals reviewed. Constitutional:       General: He is not in acute distress. Appearance: He is well-developed. Neck:      Vascular: No carotid bruit. Cardiovascular:      Rate and Rhythm: Normal rate and regular rhythm. Heart sounds: Normal heart sounds. No murmur heard. No gallop. Pulmonary:      Effort: Pulmonary effort is normal.      Breath sounds: Normal breath sounds. No wheezing or rales. Abdominal:      General: Bowel sounds are normal. There is no distension. Palpations: Abdomen is soft. Tenderness: There is no abdominal tenderness. Musculoskeletal:      Cervical back: Neck supple. Right lower leg: No edema. Left lower leg: No edema. Skin:     General: Skin is warm and dry. Neurological:      Mental Status: He is alert and oriented to person, place, and time. Results for orders placed or performed in visit on 04/07/22   POCT glycosylated hemoglobin (Hb A1C)   Result Value Ref Range    Hemoglobin A1C 6.5 %         Nakia was seen today for diabetes.     Diagnoses and all orders for this visit:    Type 2 diabetes mellitus with diabetic polyneuropathy, without long-term current use of insulin (HCC)  -     POCT glycosylated hemoglobin (Hb A1C)  -     glipiZIDE (GLUCOTROL) 5 MG tablet; Take 1 tablet by mouth daily  -     Diabetic Foot Exam  -     glipiZIDE-metFORMIN (METAGLIP) 5-500 MG per tablet; Take 1 tablet by mouth 2 times daily (before meals)    Acute pain of right knee  -     Misc. Devices MISC; Right knee compression brace  -     XR KNEE RIGHT (3 VIEWS); Future    Gastroesophageal reflux disease without esophagitis  -     omeprazole (PRILOSEC) 20 MG delayed release capsule; TAKE ONE CAPSULE BY MOUTH EVERY DAY        BMI was elevated today, and weight loss plan recommended is : conventional weight loss. Phone/MyChart follow up if tests abnormal.    Return in about 3 months (around 7/7/2022) for diabetes. I have reviewed my findings and recommendations with Deja Zavaleta.     Doretha Franco MD, M.D

## 2022-04-08 RX ORDER — GLIPIZIDE AND METFORMIN HCL 5; 500 MG/1; MG/1
1 TABLET, FILM COATED ORAL
Qty: 60 TABLET | Refills: 5 | Status: SHIPPED
Start: 2022-04-08 | End: 2022-07-11 | Stop reason: SDUPTHER

## 2022-06-08 NOTE — ED PROVIDER NOTES
Independent P  HPI:  6/20/19, Time: 8:03 PM         Gianni Borges is a 59 y.o. male presenting to the ED for left ear pain , beginning couple days  ago. The complaint has been persistent, moderate in severity, and worsened by nothing. Patient  comes in with complaint of bilateral ear pain with ear fullness pruritus slight decreased hearing loss of the left ear. He states is been going on for a couple days he attempted to be seen by ENT he was given an appointment for 2 weeks. He denies any recent illness no runny nose congestion sore throat cough. He has history of cerumen impaction in the past.  Patient denies any vision loss of vision no numbness tingling weakness of his extremities. Review of Systems:   Pertinent positives and negatives are stated within HPI, all other systems reviewed and are negative.          --------------------------------------------- PAST HISTORY ---------------------------------------------  Past Medical History:  has a past medical history of BPH (benign prostatic hyperplasia), Chronic back pain, DDD (degenerative disc disease), cervical, Depression, Fibromyalgia, GERD (gastroesophageal reflux disease), Headache, Hepatitis C, Irritable bowel syndrome, Joint pain, Memory difficulties, Neck pain, Neuropathy of foot, Osteoarthritis, and Type II or unspecified type diabetes mellitus without mention of complication, not stated as uncontrolled. Past Surgical History:  has a past surgical history that includes Abscess Drainage; Foot surgery (1998); Mouth surgery; Endoscopy, colon, diagnostic; Colonoscopy; Varicose vein surgery; and Knee arthroscopy (Left, 11/04/2016). Social History:  reports that he has never smoked. He has never used smokeless tobacco. He reports that he does not drink alcohol or use drugs. Family History: family history is not on file. The patients home medications have been reviewed. Allergies: Celebrex [celecoxib];  Lipitor [atorvastatin]; and Nexium [esomeprazole magnesium trihydrate]    -------------------------------------------------- RESULTS -------------------------------------------------  All laboratory and radiology results have been personally reviewed by myself   LABS:  No results found for this visit on 06/20/19. RADIOLOGY:  Interpreted by Radiologist.  No orders to display       ------------------------- NURSING NOTES AND VITALS REVIEWED ---------------------------   The nursing notes within the ED encounter and vital signs as below have been reviewed. BP (!) 169/81   Pulse 63   Temp 98 °F (36.7 °C) (Oral)   Resp 18   Ht 6' (1.829 m)   Wt 226 lb (102.5 kg)   SpO2 94%   BMI 30.65 kg/m²   Oxygen Saturation Interpretation: Normal      ---------------------------------------------------PHYSICAL EXAM--------------------------------------      Constitutional/General: Alert and oriented x3, well appearing, non toxic in NAD  Head: Normocephalic and atraumatic  Eyes: PERRL, EOMI  Ears right TM without erythema no inflammation of the canal.  Left TM no erythema of the canal.  There is cerumen impaction present. Mouth: Oropharynx clear, handling secretions, no trismus  Neck: Supple, full ROM,   Pulmonary: Lungs clear to auscultation bilaterally, no wheezes, rales, or rhonchi. Not in respiratory distress  Cardiovascular:  Regular rate and rhythm, no murmurs, gallops, or rubs. 2+ distal pulses  Abdomen: Soft, non tender, non distended,   Extremities: Moves all extremities x 4. Warm and well perfused  Skin: warm and dry without rash  Neurologic: GCS 15,  Psych: Normal Affect      ------------------------------ ED COURSE/MEDICAL DECISION MAKING----------------------  Medications - No data to display      ED COURSE:   Attempted to remove cerumen from the left ear and irrigated the ear small amount was removed. Medical Decision Making:    Came in with complaint of left ear pain pruritus decreased hearing on the left.   On exam he has impacted cerumen of the left ear there is no erythema of the canal present. He has had no recent fever chills sore throat sinus congestion with present use treated for cerumen impaction given Debrox drops and Ultram for pain is to follow-up with ENT as scheduled. Counseling: The emergency provider has spoken with the patient and discussed todays results, in addition to providing specific details for the plan of care and counseling regarding the diagnosis and prognosis. Questions are answered at this time and they are agreeable with the plan.      --------------------------------- IMPRESSION AND DISPOSITION ---------------------------------    IMPRESSION  1. Otalgia of left ear    2. Impacted cerumen of left ear        DISPOSITION  Disposition: Discharge to home  Patient condition is good      NOTE: This report was transcribed using voice recognition software.  Every effort was made to ensure accuracy; however, inadvertent computerized transcription errors may be present     Tessa Cintron  06/20/19 2023 [facility], Division of Infectious Diseases  [facility] Lora Y. Patel, S. [facility], [facility]  [phone]    Name: BREA BECKHAM  Age: 79y  Gender: Female  MRN: [id number]    Interval History:  Patient seen and examined at bedside this morning in the S[facility]U  No acute overnight events. Afebrile  Notes reviewed.     Antibiotics:  ertapenem  IVPB 1000 milliGRAM(s) IV Intermittent every 24 hours  ertapenem  IVPB      piperacillin/tazobactam IVPB.. 3.375 Gram(s) IV Intermittent every 8 hours      Medications:  acetaminophen    Suspension .. 650 milliGRAM(s) Oral every 6 hours PRN  albuterol/ipratropium for Nebulization 3 milliLiter(s) Nebulizer every 6 hours PRN  chlorhexidine 0.12% Liquid 15 milliLiter(s) Oral Mucosa every 12 hours  collagenase Ointment 1 Application(s) Topical daily  dextrose 5%. 1000 milliLiter(s) IV Continuous <Continuous>  dextrose 5%. 1000 milliLiter(s) IV Continuous <Continuous>  dextrose 50% Injectable 25 Gram(s) IV Push once  dextrose 50% Injectable 12.5 Gram(s) IV Push once  dextrose 50% Injectable 25 Gram(s) IV Push once  dextrose Oral Gel 15 Gram(s) Oral once PRN  ertapenem  IVPB 1000 milliGRAM(s) IV Intermittent every 24 hours  ertapenem  IVPB      folic acid 1 milliGRAM(s) Oral daily  furosemide   Injectable 40 milliGRAM(s) IV Push daily  glucagon  Injectable 1 milliGRAM(s) IntraMuscular once  insulin glargine Injectable (LANTUS) 8 Unit(s) SubCutaneous every morning  insulin lispro (ADMELOG) corrective regimen sliding scale   SubCutaneous every 6 hours  lactobacillus acidophilus 1 Tablet(s) Oral daily  LORazepam     Tablet 2 milliGRAM(s) Oral every 4 hours  LORazepam   Injectable 2 milliGRAM(s) IV Push every 4 hours PRN  methocarbamol 500 milliGRAM(s) Oral two times a day  multivitamin 1 Tablet(s) Oral daily  nystatin Powder 1 Application(s) Topical every 12 hours  pantoprazole  Injectable 40 milliGRAM(s) IV Push daily  piperacillin/tazobactam IVPB.. 3.375 Gram(s) IV Intermittent every 8 hours  povidone iodine 10% Solution 1 Application(s) Topical daily  senna 3 Tablet(s) Oral at bedtime  simethicone 80 milliGRAM(s) Chew every 6 hours      Review of Systems:  unable to obtain.    Allergies: codeine (Hives)    For details regarding the patient's past medical history, social history, family history, and other miscellaneous elements, please refer the initial infectious diseases consultation and/or the admitting history and physical examination for this admission.    Objective:  Vitals:   T(C): 36.6 (06-08-22 @ 08:48), Max: 37.2 (06-08-22 @ 00:05)  HR: 71 (06-08-22 @ 07:45) (60 - 85)  BP: 117/48 (06-08-22 @ 04:00) (96/54 - 128/45)  RR: 21 (06-08-22 @ 04:00) (18 - 24)  SpO2: 100% (06-08-22 @ 07:45) (99% - 100%)    Mode: AC/ CMV (Assist Control/ Continuous Mandatory Ventilation)  RR (machine): 24  TV (machine): 400  FiO2: 30  PEEP: 5  ITime: 0.9  MAP: 14  PIP: 29      Physical Examination:  General: no acute distress  HEENT: NC/AT, EOMI  Neck: trach to vent  Cardio: S1, S2 heard, RRR, no murmurs  Resp: MV breath sounds  Abd: soft, NT, ND, PGT in place  Ext: no edema or cyanosis  Skin: warm, dry, no visible rash    Laboratory Studies:  CBC:                       12.4   7.90  )-----------( 265      ( 08 Jun 2022 06:00 )             40.8     CMP: 06-08    136  |  98  |  45<H>  ----------------------------<  144<H>  5.3   |  27  |  1.25    Ca    9.6      08 Jun 2022 06:00    TPro  7.8  /  Alb  2.5<L>  /  TBili  0.6  /  DBili  x   /  AST  14  /  ALT  17  /  AlkPhos  144<H>  06-08    LIVER FUNCTIONS - ( 08 Jun 2022 06:00 )  Alb: 2.5 g/dL / Pro: 7.8 g/dL / ALK PHOS: 144 U/L / ALT: 17 U/L DA / AST: 14 U/L / GGT: x               Microbiology: reviewed    Culture - Urine (collected 06-03-22 @ 21:53)  Source: Catheterized Catheterized  Final Report (06-05-22 @ 17:10):    <10,000 CFU/mL Normal Urogenital Elvira    Culture - Blood (collected 06-03-22 @ 18:12)  Source: .Blood Blood-Peripheral  Preliminary Report (06-05-22 @ 01:01):    No growth to date.    Culture - Blood (collected 06-03-22 @ 18:12)  Source: .Blood Blood-Peripheral  Preliminary Report (06-05-22 @ 01:01):    No growth to date.    Culture - Sputum (collected 06-02-22 @ 14:57)  Source: Trach Asp Tracheal Aspirate  Gram Stain (06-02-22 @ 23:35):    Rare polymorphonuclear leukocytes per low power field    Rare Squamous epithelial cells per low power field    Moderate Gram Negative Rods per oil power field    Moderate Gram Negative Coccobacilli per oil power field  Final Report (06-05-22 @ 17:27):    Numerous Serratia marcescens    Moderate Pseudomonas aeruginosa (Carbapenem Resistant)    Normal Respiratory Elvira present  Organism: Pseudomonas aeruginosa (Carbapenem Resistant) (06-07-22 @ 09:35)      -  Amikacin: S <=16      -  Aztreonam: S <=4      -  Cefepime: S 4      -  Ceftazidime: S 4      -  Ceftazidime/Avibactam: S <=4      -  Ceftolozane/tazobactam: S <=2      -  Ciprofloxacin: S <=0.25      -  Gentamicin: S <=2      -  Imipenem: R >8      -  Levofloxacin: S <=0.5      -  Meropenem: R 8      -  Piperacillin/Tazobactam: S <=8      -  Tobramycin: S <=2      Method Type: PRATIK  Organism: Serratia marcescens (06-07-22 @ 09:35)      -  Amikacin: S <=16      -  Amoxicillin/Clavulanic Acid: R >16/8      -  Ampicillin: R >16 These ampicillin results predict results for amoxicillin      -  Ampicillin/Sulbactam: R >16/8 Enterobacter, Klebsiella aerogenes, Citrobacter, and Serratia may develop resistance during prolonged therapy (3-4 days)      -  Aztreonam: R >16      -  Cefazolin: R >16 Enterobacter, Klebsiella aerogenes, Citrobacter, and Serratia may develop resistance during prolonged therapy (3-4 days)      -  Cefepime: R >16      -  Cefoxitin: R >16      -  Ceftazidime/Avibactam: R >16      -  Ceftolozane/tazobactam: R >8      -  Ceftriaxone: R >32 Enterobacter, Klebsiella aerogenes, Citrobacter, and Serratia may develop resistance during prolonged therapy      -  Ciprofloxacin: R >2      -  Ertapenem: S <=0.5      -  Gentamicin: S <=2      -  Levofloxacin: R 2      -  Meropenem: S <=1      -  Piperacillin/Tazobactam: I 32      -  Tobramycin: S 4      -  Trimethoprim/Sulfamethoxazole: S <=0.5/9.5      Method Type: PRATIK  Organism: Serratia marcescens  Pseudomonas aeruginosa (Carbapenem Resistant) (06-05-22 @ 17:27)    Culture - Fungal, Body Fluid (collected 05-25-22 @ 10:39)  Source: .Body Fluid Pleural Fluid  Preliminary Report (06-04-22 @ 15:02):    No growth    Culture - Body Fluid with Gram Stain (collected 05-25-22 @ 10:39)  Source: .Body Fluid Pleural Fluid  Gram Stain (05-25-22 @ 19:50):    polymorphonuclear leukocytes seen    No organisms seen    by cytocentrifuge  Final Report (05-30-22 @ 09:04):    No growth    Culture - Acid Fast - Body Fluid w/Smear (collected 05-25-22 @ 10:39)  Source: .Body Fluid Pleural Fluid  Preliminary Report (06-04-22 @ 15:04):    No growth at 1 week.        Radiology: reviewed

## 2022-06-24 ENCOUNTER — TELEPHONE (OUTPATIENT)
Dept: FAMILY MEDICINE CLINIC | Age: 67
End: 2022-06-24

## 2022-06-24 NOTE — TELEPHONE ENCOUNTER
[facility] called stating that a pt with diabets should be on a statin. Please advise.     Last seen 4/7/2022  Next appt 7/11/2022

## 2022-07-11 ENCOUNTER — OFFICE VISIT (OUTPATIENT)
Dept: FAMILY MEDICINE CLINIC | Age: 67
End: 2022-07-11
Payer: MEDICARE

## 2022-07-11 VITALS
HEART RATE: 60 BPM | RESPIRATION RATE: 20 BRPM | BODY MASS INDEX: 30.07 KG/M2 | HEIGHT: 72 IN | DIASTOLIC BLOOD PRESSURE: 74 MMHG | OXYGEN SATURATION: 97 % | WEIGHT: 222 LBS | SYSTOLIC BLOOD PRESSURE: 132 MMHG

## 2022-07-11 DIAGNOSIS — Z23 NEED FOR PROPHYLACTIC VACCINATION AGAINST STREPTOCOCCUS PNEUMONIAE (PNEUMOCOCCUS): ICD-10-CM

## 2022-07-11 DIAGNOSIS — M77.02 MEDIAL EPICONDYLITIS OF LEFT ELBOW: ICD-10-CM

## 2022-07-11 DIAGNOSIS — E11.42 TYPE 2 DIABETES MELLITUS WITH DIABETIC POLYNEUROPATHY, WITHOUT LONG-TERM CURRENT USE OF INSULIN (HCC): Primary | ICD-10-CM

## 2022-07-11 LAB — HBA1C MFR BLD: 7.6 %

## 2022-07-11 PROCEDURE — 1036F TOBACCO NON-USER: CPT | Performed by: FAMILY MEDICINE

## 2022-07-11 PROCEDURE — 90677 PCV20 VACCINE IM: CPT | Performed by: FAMILY MEDICINE

## 2022-07-11 PROCEDURE — G8427 DOCREV CUR MEDS BY ELIG CLIN: HCPCS | Performed by: FAMILY MEDICINE

## 2022-07-11 PROCEDURE — G8417 CALC BMI ABV UP PARAM F/U: HCPCS | Performed by: FAMILY MEDICINE

## 2022-07-11 PROCEDURE — 83036 HEMOGLOBIN GLYCOSYLATED A1C: CPT | Performed by: FAMILY MEDICINE

## 2022-07-11 PROCEDURE — 2022F DILAT RTA XM EVC RTNOPTHY: CPT | Performed by: FAMILY MEDICINE

## 2022-07-11 PROCEDURE — 99214 OFFICE O/P EST MOD 30 MIN: CPT | Performed by: FAMILY MEDICINE

## 2022-07-11 PROCEDURE — 1123F ACP DISCUSS/DSCN MKR DOCD: CPT | Performed by: FAMILY MEDICINE

## 2022-07-11 PROCEDURE — 3017F COLORECTAL CA SCREEN DOC REV: CPT | Performed by: FAMILY MEDICINE

## 2022-07-11 PROCEDURE — 3051F HG A1C>EQUAL 7.0%<8.0%: CPT | Performed by: FAMILY MEDICINE

## 2022-07-11 RX ORDER — GLIPIZIDE 10 MG/1
10 TABLET ORAL DAILY
Qty: 30 TABLET | Refills: 3 | Status: SHIPPED
Start: 2022-07-11 | End: 2022-10-21

## 2022-07-11 RX ORDER — GLIPIZIDE AND METFORMIN HCL 5; 500 MG/1; MG/1
1 TABLET, FILM COATED ORAL
Qty: 60 TABLET | Refills: 5 | Status: SHIPPED
Start: 2022-07-11 | End: 2022-10-21

## 2022-07-11 ASSESSMENT — PATIENT HEALTH QUESTIONNAIRE - PHQ9
1. LITTLE INTEREST OR PLEASURE IN DOING THINGS: 0
SUM OF ALL RESPONSES TO PHQ QUESTIONS 1-9: 1
2. FEELING DOWN, DEPRESSED OR HOPELESS: 1
SUM OF ALL RESPONSES TO PHQ QUESTIONS 1-9: 1
SUM OF ALL RESPONSES TO PHQ9 QUESTIONS 1 & 2: 1

## 2022-07-11 ASSESSMENT — ENCOUNTER SYMPTOMS
DIARRHEA: 0
VOMITING: 0
SHORTNESS OF BREATH: 1
NAUSEA: 0

## 2022-07-11 NOTE — PROGRESS NOTES
OFFICE PROGRESS NOTE      SUBJECTIVE:        Patient ID:   Kasi Millan is a 73 y.o. male who presents for   Chief Complaint   Patient presents with    Diabetes         HPI:   Patient is here to follow up on diabetes. Fasting blood sugars:106-110 Midday blood sugars: 110's. Patient checks blood glucose 1-2 times per day. Patient is following diabetic diet. Patient is a nonsmoker. Last ophthalmology visit: 1/2022. Patient is taking a daily statin. Recent lab results reviewed including CMP, CBC, TSH, and lipid panel which are remarkable for hyperglycemia. Last urine microalbumin: 10/2021. Patient has had left elbow pain recently. Worse with straightening left elbow while swimming. Prior to Admission medications    Medication Sig Start Date End Date Taking? Authorizing Provider   glipiZIDE-metFORMIN (METAGLIP) 5-500 MG per tablet Take 1 tablet by mouth 2 times daily (before meals) 7/11/22  Yes Aiden Brennan MD   mupirocin (BACTROBAN) 2 % ointment Apply topically 3 times daily. 7/11/22  Yes Aiden Brennan MD   Misc.  Devices MISC Left elbow splint  Dx: medial epicondylitis 7/11/22  Yes Aiden Brennan MD   glipiZIDE (GLUCOTROL) 10 MG tablet Take 1 tablet by mouth daily 7/11/22  Yes Aiden Brennan MD   omeprazole (PRILOSEC) 20 MG delayed release capsule TAKE ONE CAPSULE BY MOUTH EVERY DAY 4/7/22  Yes Aiden Brennan MD   blood glucose test strips (ASCENSIA AUTODISC VI;ONE TOUCH ULTRA TEST VI) strip Check blood sugar BID 2/25/22  Yes Aiden Brennan MD   diclofenac sodium (VOLTAREN) 1 % GEL APPLY 2 GRAMS TOPICALLY TWICE DAILY  11/1/21  Yes Aiden Brennan MD   Elastic Bandages & Supports (JOBST KNEE HIGH COMPRESSION ) MISC Knee high with 20- 30 mmhg of compression 6/3/21  Yes Shai Kelsey MD   ONE TOUCH ULTRASOFT LANCETS MISC Check blood sugar BID 4/14/21  Yes Aiden Brennan MD   Blood Glucose Monitoring Suppl (ONE TOUCH ULTRA 2) w/Device KIT Check blood sugar BID 4/14/21  Yes Mike Dubon MD   Prenatal Vit-Fe Fumarate-FA (PREPLUS) 27-1 MG TABS TAKE ONE TABLET BY MOUTH EVERY DAY 12/25/20  Yes Historical Provider, MD   melatonin 3 MG TABS tablet TAKE ONE TABLET BY MOUTH EVERY DAY 4/23/19  Yes Mike Dubon MD   gabapentin (NEURONTIN) 300 MG capsule Take 300 mg by mouth 3 times daily. .   Yes Historical Provider, MD     Social History     Socioeconomic History    Marital status:      Spouse name: None    Number of children: None    Years of education: None    Highest education level: None   Occupational History    None   Tobacco Use    Smoking status: Never Smoker    Smokeless tobacco: Never Used   Vaping Use    Vaping Use: Never used   Substance and Sexual Activity    Alcohol use: No    Drug use: No    Sexual activity: Yes     Partners: Female   Other Topics Concern    None   Social History Narrative    None     Social Determinants of Health     Financial Resource Strain:     Difficulty of Paying Living Expenses: Not on file   Food Insecurity: No Food Insecurity    Worried About Running Out of Food in the Last Year: Never true    Arlyn of Food in the Last Year: Never true   Transportation Needs:     Lack of Transportation (Medical): Not on file    Lack of Transportation (Non-Medical):  Not on file   Physical Activity:     Days of Exercise per Week: Not on file    Minutes of Exercise per Session: Not on file   Stress: No Stress Concern Present    Feeling of Stress : Not at all   Social Connections:     Frequency of Communication with Friends and Family: Not on file    Frequency of Social Gatherings with Friends and Family: Not on file    Attends Ep[employer] Services: Not on file    Active Member of Clubs or Organizations: Not on file    Attends Club or Organization Meetings: Not on file    Marital Status: Not on file   Intimate Partner Violence:     Fear of Current or Ex-Partner: Not on file    Emotionally Abused: Not on file    Physically Abused: Not on file    Sexually Abused: Not on file   Housing Stability:     Unable to Pay for Housing in the Last Year: Not on file    Number of Places Lived in the Last Year: Not on file    Unstable Housing in the Last Year: Not on file       I have reviewed Nakia's allergies, medications, problem list, medical, social and family history and have updated as needed in the electronic medical record    Current Outpatient Medications   Medication Sig Dispense Refill    glipiZIDE-metFORMIN (METAGLIP) 5-500 MG per tablet Take 1 tablet by mouth 2 times daily (before meals) 60 tablet 5    mupirocin (BACTROBAN) 2 % ointment Apply topically 3 times daily. 22 g 5    Misc. Devices MISC Left elbow splint  Dx: medial epicondylitis 1 each 0    glipiZIDE (GLUCOTROL) 10 MG tablet Take 1 tablet by mouth daily 30 tablet 3    omeprazole (PRILOSEC) 20 MG delayed release capsule TAKE ONE CAPSULE BY MOUTH EVERY DAY 30 capsule 5    blood glucose test strips (ASCENSIA AUTODISC VI;ONE TOUCH ULTRA TEST VI) strip Check blood sugar  each 12    diclofenac sodium (VOLTAREN) 1 % GEL APPLY 2 GRAMS TOPICALLY TWICE DAILY  100 g 5    Elastic Bandages & Supports (JOBST KNEE HIGH COMPRESSION SM) MISC Knee high with 20- 30 mmhg of compression 1 each 10    ONE TOUCH ULTRASOFT LANCETS MISC Check blood sugar  each 12    Blood Glucose Monitoring Suppl (ONE TOUCH ULTRA 2) w/Device KIT Check blood sugar BID 1 kit 0    Prenatal Vit-Fe Fumarate-FA (PREPLUS) 27-1 MG TABS TAKE ONE TABLET BY MOUTH EVERY DAY      melatonin 3 MG TABS tablet TAKE ONE TABLET BY MOUTH EVERY DAY 30 tablet 3    gabapentin (NEURONTIN) 300 MG capsule Take 300 mg by mouth 3 times daily. .       No current facility-administered medications for this visit. Review Of Systems:    Review of Systems   Eyes: Negative for visual disturbance.    Respiratory: Positive for mouth 2 times daily (before meals)  -     POCT glycosylated hemoglobin (Hb A1C)  -     Increase glipiZIDE (GLUCOTROL) 10 MG tablet; Take 1 tablet by mouth daily    Medial epicondylitis of left elbow  -     Misc. Devices MISC; Left elbow splint  Dx: medial epicondylitis    Need for prophylactic vaccination against Streptococcus pneumoniae (pneumococcus)  -     Pneumococcal, PCV20, PREVNAR 21, (age 25 yrs+), IM, PF        Phone/MyChart follow up if tests abnormal.    Return in about 3 months (around 10/11/2022) for Annual Medicare Wellness Visit--30 minutes, diabetes. I have reviewed my findings and recommendations with Jermaine Kathleen.     Emily Aburto MD, M.D

## 2022-08-30 ENCOUNTER — TELEPHONE (OUTPATIENT)
Dept: FAMILY MEDICINE CLINIC | Age: 67
End: 2022-08-30

## 2022-08-30 NOTE — TELEPHONE ENCOUNTER
Patient dropped off a note requesting authorization for additional chiropractic treatments. I called the [employer] number he provided and was on hold for over 10 minutes without speaking to a single person. Please inform patient that we do not have time to spend on the phone with his insurance company. If he has exceeded his chiropractic treatments that are allowable for this calendar year he will need to resume them in 2023. We can also refer him to physical therapy, which would be covered by his insurance. 08-Jan-2021 03:05

## 2022-10-17 ENCOUNTER — OFFICE VISIT (OUTPATIENT)
Dept: FAMILY MEDICINE CLINIC | Age: 67
End: 2022-10-17
Payer: MEDICARE

## 2022-10-17 VITALS
WEIGHT: 222 LBS | DIASTOLIC BLOOD PRESSURE: 72 MMHG | SYSTOLIC BLOOD PRESSURE: 134 MMHG | HEART RATE: 64 BPM | RESPIRATION RATE: 20 BRPM | BODY MASS INDEX: 30.07 KG/M2 | HEIGHT: 72 IN | OXYGEN SATURATION: 97 %

## 2022-10-17 DIAGNOSIS — K21.9 GASTROESOPHAGEAL REFLUX DISEASE WITHOUT ESOPHAGITIS: ICD-10-CM

## 2022-10-17 DIAGNOSIS — E11.42 TYPE 2 DIABETES MELLITUS WITH DIABETIC POLYNEUROPATHY, WITHOUT LONG-TERM CURRENT USE OF INSULIN (HCC): ICD-10-CM

## 2022-10-17 DIAGNOSIS — Z00.00 INITIAL MEDICARE ANNUAL WELLNESS VISIT: Primary | ICD-10-CM

## 2022-10-17 LAB — HBA1C MFR BLD: 9.2 %

## 2022-10-17 PROCEDURE — G8482 FLU IMMUNIZE ORDER/ADMIN: HCPCS | Performed by: FAMILY MEDICINE

## 2022-10-17 PROCEDURE — 3046F HEMOGLOBIN A1C LEVEL >9.0%: CPT | Performed by: FAMILY MEDICINE

## 2022-10-17 PROCEDURE — 3017F COLORECTAL CA SCREEN DOC REV: CPT | Performed by: FAMILY MEDICINE

## 2022-10-17 PROCEDURE — 1123F ACP DISCUSS/DSCN MKR DOCD: CPT | Performed by: FAMILY MEDICINE

## 2022-10-17 PROCEDURE — 83036 HEMOGLOBIN GLYCOSYLATED A1C: CPT | Performed by: FAMILY MEDICINE

## 2022-10-17 PROCEDURE — G0438 PPPS, INITIAL VISIT: HCPCS | Performed by: FAMILY MEDICINE

## 2022-10-17 RX ORDER — TRIAMCINOLONE ACETONIDE 1 MG/G
CREAM TOPICAL
Qty: 80 G | Refills: 1 | Status: SHIPPED | OUTPATIENT
Start: 2022-10-17

## 2022-10-17 RX ORDER — OMEPRAZOLE 20 MG/1
CAPSULE, DELAYED RELEASE ORAL
Qty: 30 CAPSULE | Refills: 5 | Status: SHIPPED | OUTPATIENT
Start: 2022-10-17

## 2022-10-17 SDOH — ECONOMIC STABILITY: FOOD INSECURITY: WITHIN THE PAST 12 MONTHS, THE FOOD YOU BOUGHT JUST DIDN'T LAST AND YOU DIDN'T HAVE MONEY TO GET MORE.: NEVER TRUE

## 2022-10-17 SDOH — ECONOMIC STABILITY: FOOD INSECURITY: WITHIN THE PAST 12 MONTHS, YOU WORRIED THAT YOUR FOOD WOULD RUN OUT BEFORE YOU GOT MONEY TO BUY MORE.: NEVER TRUE

## 2022-10-17 ASSESSMENT — PATIENT HEALTH QUESTIONNAIRE - PHQ9
SUM OF ALL RESPONSES TO PHQ QUESTIONS 1-9: 1
2. FEELING DOWN, DEPRESSED OR HOPELESS: 0
SUM OF ALL RESPONSES TO PHQ9 QUESTIONS 1 & 2: 1
1. LITTLE INTEREST OR PLEASURE IN DOING THINGS: 1
SUM OF ALL RESPONSES TO PHQ QUESTIONS 1-9: 1

## 2022-10-17 ASSESSMENT — LIFESTYLE VARIABLES
HOW OFTEN DO YOU HAVE A DRINK CONTAINING ALCOHOL: NEVER
HOW OFTEN DO YOU HAVE A DRINK CONTAINING ALCOHOL: NEVER

## 2022-10-17 NOTE — PROGRESS NOTES
Medicare Annual Wellness Visit    Payton Wynn is here for Medicare AWV    Assessment & Plan   Initial Medicare annual wellness visit  Type 2 diabetes mellitus with diabetic polyneuropathy, without long-term current use of insulin ([facility])  -     POCT glycosylated hemoglobin (Hb A1C)  -     glipiZIDE (GLUCOTROL) 10 MG tablet; Take 1 tablet by mouth daily, Disp-30 tablet  -     glipiZIDE-metFORMIN (METAGLIP) 5-500 MG per tablet; Take 1 tablet by mouth 2 times daily (before meals)  -     improve diet    Gastroesophageal reflux disease without esophagitis  -     omeprazole (PRILOSEC) 20 MG delayed release capsule; TAKE ONE CAPSULE BY MOUTH EVERY DAY, Disp-30 capsule, R-5Normal    Recommendations for Preventive Services Due: see orders and patient instructions/AVS.  Recommended screening schedule for the next 5-10 years is provided to the patient in written form: see Patient Instructions/AVS.     Return in 2 months (on 12/17/2022) for Medicare Annual Wellness Visit in 1 year, diabetes. Subjective   Patient is here to follow up on diabetes. Fasting blood sugars:120's Midday blood sugars: not checking. Patient checks blood glucose 1 times per day. Patient is not following diabetic diet. Patient is a nonsmoker. Last ophthalmology visit: 1/2022. Patient is unable to tolerate daily statin. Recent lab results reviewed with patient, including CMP, CBC, TSH, and lipid panel which are remarkable for hyperglycemia. Last urine microalbumin: 10/2021. Patient's complete Health Risk Assessment and screening values have been reviewed and are found in Flowsheets. The following problems were reviewed today and where indicated follow up appointments were made and/or referrals ordered.     Positive Risk Factor Screenings with Interventions:              Health Habits/Nutrition:  Physical Activity: Sufficiently Active    Days of Exercise per Week: 4 days    Minutes of Exercise per Session: 50 min     Have you lost any weight without trying in the past 3 months?: No  Body mass index: (!) 30.11  Have you seen the dentist within the past year?: Yes  Health Habits/Nutrition Interventions:  Nutritional issues:  educational materials for healthy, well-balanced diet provided             Objective   Vitals:    10/17/22 1355   BP: 134/72   Pulse: 64   Resp: 20   SpO2: 97%   Weight: 222 lb (100.7 kg)   Height: 6' (1.829 m)      Body mass index is 30.11 kg/m². Physical Exam  Vitals reviewed. Constitutional:       General: He is not in acute distress. Appearance: He is well-developed. Neck:      Vascular: No carotid bruit. Cardiovascular:      Rate and Rhythm: Normal rate and regular rhythm. Heart sounds: Normal heart sounds. No murmur heard. No gallop. Pulmonary:      Effort: Pulmonary effort is normal.      Breath sounds: Normal breath sounds. No wheezing or rales. Abdominal:      General: Bowel sounds are normal. There is no distension. Palpations: Abdomen is soft. Tenderness: There is no abdominal tenderness. Musculoskeletal:      Cervical back: Neck supple. Right lower leg: No edema. Left lower leg: No edema. Skin:     General: Skin is warm and dry. Neurological:      Mental Status: He is alert and oriented to person, place, and time. Allergies   Allergen Reactions    Celebrex [Celecoxib]      ? ??    Lipitor [Atorvastatin] Other (See Comments)     Urinary retention    Nexium [Esomeprazole Magnesium Trihydrate]      Sore throat     Prior to Visit Medications    Medication Sig Taking?  Authorizing Provider   glipiZIDE (GLUCOTROL) 10 MG tablet Take 1 tablet by mouth daily Yes Maverick Duarte MD   glipiZIDE-metFORMIN (METAGLIP) 5-500 MG per tablet Take 1 tablet by mouth 2 times daily (before meals) Yes Maverick Duarte MD   omeprazole (PRILOSEC) 20 MG delayed release capsule TAKE ONE CAPSULE BY MOUTH EVERY DAY Yes Maverick Duarte MD triamcinolone (KENALOG) 0.1 % cream Apply topically 2 times daily for up to 2 weeks then as needed for flareups. Yes Heather Meneses MD   mupirocin (BACTROBAN) 2 % ointment Apply topically 3 times daily. Yes Heather Meneses MD   Misc. Devices MISC Left elbow splint  Dx: medial epicondylitis Yes Heather Meneses MD   blood glucose test strips (ASCENSIA AUTODISC VI;ONE TOUCH ULTRA TEST VI) strip Check blood sugar BID Yes Heather Meneses MD   diclofenac sodium (VOLTAREN) 1 % GEL APPLY 2 GRAMS TOPICALLY TWICE DAILY  Yes Heather Meneses MD   Elastic Bandages & Supports (JOBST KNEE HIGH COMPRESSION ) MISC Knee high with 20- 30 mmhg of compression Yes Rom Perera MD   ONE TOUCH ULTRASOFT LANCETS MISC Check blood sugar BID Yes Heather Meneses MD   Blood Glucose Monitoring Suppl (ONE TOUCH ULTRA 2) w/Device KIT Check blood sugar BID Yes Heather Meneses MD   Prenatal Vit-Fe Fumarate-FA (PREPLUS) 27-1 MG TABS TAKE ONE TABLET BY MOUTH EVERY DAY Yes Historical Provider, MD   melatonin 3 MG TABS tablet TAKE ONE TABLET BY MOUTH EVERY DAY Yes Heather Meneses MD   gabapentin (NEURONTIN) 300 MG capsule Take 300 mg by mouth 3 times daily. . Yes Historical Provider, MD Dietrich (Including outside providers/suppliers regularly involved in providing care):   Patient Care Team:  Heather Meneses MD as PCP - General  Heather Meneses MD as PCP [facility] Empaneled Provider  Maria Ines Granado DO (Physical Medicine and Rehab)     Reviewed and updated this visit:  Tobacco  Allergies  Meds  Problems  Med Hx  Surg Hx  Soc Hx  Fam Hx

## 2022-10-17 NOTE — PATIENT INSTRUCTIONS
Personalized Preventive Plan for Ford Obrien - 10/17/2022  Medicare offers a range of preventive health benefits. Some of the tests and screenings are paid in full while other may be subject to a deductible, co-insurance, and/or copay. Some of these benefits include a comprehensive review of your medical history including lifestyle, illnesses that may run in your family, and various assessments and screenings as appropriate. After reviewing your medical record and screening and assessments performed today your provider may have ordered immunizations, labs, imaging, and/or referrals for you. A list of these orders (if applicable) as well as your Preventive Care list are included within your After Visit Summary for your review. Other Preventive Recommendations:    A preventive eye exam performed by an eye specialist is recommended every 1-2 years to screen for glaucoma; cataracts, macular degeneration, and other eye disorders. A preventive dental visit is recommended every 6 months. Try to get at least 150 minutes of exercise per week or 10,000 steps per day on a pedometer . Order or download the FREE \"Exercise & Physical Activity: Your Everyday Guide\" from [employer]. Call 1-509.113.9406 or search [employer] online. You need 7841-8997 mg of calcium and 7249-7226 IU of vitamin D per day. It is possible to meet your calcium requirement with diet alone, but a vitamin D supplement is usually necessary to meet this goal.  When exposed to the sun, use a sunscreen that protects against both UVA and UVB radiation with an SPF of 30 or greater. Reapply every 2 to 3 hours or after sweating, drying off with a towel, or swimming. Always wear a seat belt when traveling in a car. Always wear a helmet when riding a bicycle or motorcycle. Heart-Healthy Diet   Sodium, Fat, and Cholesterol Controlled Diet       What Is a Heart Healthy Diet?    A heart-healthy diet is one that limits sodium , certain types of fat , and cholesterol . This type of diet is recommended for:   People with any form of cardiovascular disease (eg, coronary heart disease , peripheral vascular disease , previous heart attack , previous stroke )   People with risk factors for cardiovascular disease, such as high blood pressure , high cholesterol , or diabetes   Anyone who wants to lower their risk of developing cardiovascular disease   Sodium    Sodium is a mineral found in many foods. In general, most people consume much more sodium than they need. Diets high in sodium can increase blood pressure and lead to edema (water retention). On a heart-healthy diet, you should consume no more than 2,300 mg (milligrams) of sodium per dayabout the amount in one teaspoon of table salt. The foods highest in sodium include table salt (about 50% sodium), processed foods, convenience foods, and preserved foods. Cholesterol    Cholesterol is a fat-like, waxy substance in your blood. Our bodies make some cholesterol. It is also found in animal products, with the highest amounts in fatty meat, egg yolks, whole milk, cheese, shellfish, and organ meats. On a heart-healthy diet, you should limit your cholesterol intake to less than 200 mg per day. It is normal and important to have some cholesterol in your bloodstream. But too much cholesterol can cause plaque to build up within your arteries, which can eventually lead to a heart attack or stroke. The two types of cholesterol that are most commonly referred to are:   Low-density lipoprotein (LDL) cholesterol  Also known as bad cholesterol, this is the cholesterol that tends to build up along your arteries. Bad cholesterol levels are increased by eating fats that are saturated or hydrogenated. Optimal level of this cholesterol is less than 100. Over 130 starts to get risky for heart disease.    High-density lipoprotein (HDL) cholesterol  Also known as good cholesterol, this type of cholesterol actually carries cholesterol away from your arteries and may, therefore, help lower your risk of having a heart attack. You want this level to be high (ideally greater than 60). It is a risk to have a level less than 40. You can raise this good cholesterol by eating olive oil, canola oil, avocados, or nuts. Exercise raises this level, too. Fat    Fat is calorie dense and packs a lot of calories into a small amount of food. Even though fats should be limited due to their high calorie content, not all fats are bad. In fact, some fats are quite healthful. Fat can be broken down into four main types. The good-for-you fats are:   Monounsaturated fat  found in oils such as olive and canola, avocados, and nuts and natural nut butters; can decrease cholesterol levels, while keeping levels of HDL cholesterol high   Polyunsaturated fat  found in oils such as safflower, sunflower, soybean, corn, and sesame; can decrease total cholesterol and LDL cholesterol   Omega-3 fatty acids  particularly those found in fatty fish (such as salmon, trout, tuna, mackerel, herring, and sardines); can decrease risk of arrhythmias, decrease triglyceride levels, and slightly lower blood pressure   The fats that you want to limit are:   Saturated fat  found in animal products, many fast foods, and a few vegetables; increases total blood cholesterol, including LDL levels   Animal fats that are saturated include: butter, lard, whole-milk dairy products, meat fat, and poultry skin   Vegetable fats that are saturated include: hydrogenated shortening, palm oil, coconut oil, cocoa butter   Hydrogenated or trans fat  found in margarine and vegetable shortening, most shelf stable snack foods, and fried foods; increases LDL and decreases HDL     It is generally recommended that you limit your total fat for the day to less than 30% of your total calories.  If you follow an 1800-calorie heart healthy diet, for example, this would mean 60 grams of fat or less per day. Saturated fat and trans fat in your diet raises your blood cholesterol the most, much more than dietary cholesterol does. For this reason, on a heart-healthy diet, less than 7% of your calories should come from saturated fat and ideally 0% from trans fat. On an 1800-calorie diet, this translates into less than 14 grams of saturated fat per day, leaving 46 grams of fat to come from mono- and polyunsaturated fats.    Food Choices on a Heart Healthy Diet   Food Category   Foods Recommended   Foods to Avoid   Grains   Breads and rolls without salted tops Most dry and cooked cereals Unsalted crackers and breadsticks Low-sodium or homemade breadcrumbs or stuffing All rice and pastas   Breads, rolls, and crackers with salted tops High-fat baked goods (eg, muffins, donuts, pastries) Quick breads, self-rising flour, and biscuit mixes Regular bread crumbs Instant hot cereals Commercially prepared rice, pasta, or stuffing mixes   Vegetables   Most fresh, frozen, and low-sodium canned vegetables Low-sodium and salt-free vegetable juices Canned vegetables if unsalted or rinsed   Regular canned vegetables and juices, including sauerkraut and pickled vegetables Frozen vegetables with sauces Commercially prepared potato and vegetable mixes   Fruits   Most fresh, frozen, and canned fruits All fruit juices   Fruits processed with salt or sodium   Milk   Nonfat or low-fat (1%) milk Nonfat or low-fat yogurt Cottage cheese, low-fat ricotta, cheeses labeled as low-fat and low-sodium   Whole milk Reduced-fat (2%) milk Malted and chocolate milk Full fat yogurt Most cheeses (unless low-fat and low salt) Buttermilk (no more than 1 cup per week)   Meats and Beans   Lean cuts of fresh or frozen beef, veal, lamb, or pork (look for the word loin) Fresh or frozen poultry without the skin Fresh or frozen fish and some shellfish Egg whites and egg substitutes (Limit whole eggs to three per week) Tofu Nuts or seeds (unsalted, dry-roasted), low-sodium peanut butter Dried peas, beans, and lentils   Any smoked, cured, salted, or canned meat, fish, or poultry (including julien, chipped beef, cold cuts, hot dogs, sausages, sardines, and anchovies) Poultry skins Breaded and/or fried fish or meats Canned peas, beans, and lentils Salted nuts   Fats and Oils   Olive oil and canola oil Low-sodium, low-fat salad dressings and mayonnaise   Butter, margarine, coconut and palm oils, julien fat   Snacks, Sweets, and Condiments   Low-sodium or unsalted versions of broths, soups, soy sauce, and condiments Pepper, herbs, and spices; vinegar, lemon, or lime juice Low-fat frozen desserts (yogurt, sherbet, fruit bars) Sugar, cocoa powder, honey, syrup, jam, and preserves Low-fat, trans-fat free cookies, cakes, and pies Angel and animal crackers, fig bars, esther snaps   High-fat desserts Broth, soups, gravies, and sauces, made from instant mixes or other high-sodium ingredients Salted snack foods Canned olives Meat tenderizers, seasoning salt, and most flavored vinegars   Beverages   Low-sodium carbonated beverages Tea and coffee in moderation Soy milk   Commercially softened water   Suggestions   Make whole grains, fruits, and vegetables the base of your diet. Choose heart-healthy fats such as canola, olive, and flaxseed oil, and foods high in heart-healthy fats, such as nuts, seeds, soybeans, tofu, and fish. Eat fish at least twice per week; the fish highest in omega-3 fatty acids and lowest in mercury include salmon, herring, mackerel, sardines, and canned chunk light tuna. If you eat fish less than twice per week or have high triglycerides, talk to your doctor about taking fish oil supplements. Read food labels. For products low in fat and cholesterol, look for fat free, low-fat, cholesterol free, saturated fat free, and trans fat freeAlso scan the Nutrition Facts Label, which lists saturated fat, trans fat, and cholesterol amounts. For products low in sodium, look for sodium free, very low sodium, low sodium, no added salt, and unsalted   Skip the salt when cooking or at the table; if food needs more flavor, get creative and try out different herbs and spices. Garlic and onion also add substantial flavor to foods. Trim any visible fat off meat and poultry before cooking, and drain the fat off after palacios. Use cooking methods that require little or no added fat, such as grilling, boiling, baking, poaching, broiling, roasting, steaming, stir-frying, and sauting. Avoid fast food and convenience food. They tend to be high in saturated and trans fat and have a lot of added salt. Talk to a registered dietitian for individualized diet advice. Last Reviewed: March 2011 Edie Carrillo MS, MPH, RD   Updated: 3/29/2011     Keep Your Memory Marline Pi       Many factors can affect your ability to remembera hectic lifestyle, aging, stress, chronic disease, and certain medicines. But, there are steps you can take to sharpen your mind and help preserve your memory. Challenge Your Brain   Regularly challenging your mind may help keeps it in top shape. Good mental exercises include:   Crossword puzzlesUse a dictionary if you need it; you will learn more that way. Brainteasers Try some! Crafts, such as wood working and sewing   Hobbies, such as gardening and building model airplanes   SocializingVisit old friends or join groups to meet new ones. Reading   Learning a new language   Taking a class, whether it be art history or adore chi   TravelingExperience the food, history, and culture of your destination   Learning to use a computer   Going to museums, the theater, or thought-provoking movies   Changing things in your daily life, such as reversing your pattern in the grocery store or brushing your teeth using your nondominant hand   Use Memory Aids   There is no need to remember every detail on your own.  These memory aids can help: Calendars and day planners   Electronic organizers to store all sorts of helpful informationThese devices can \"beep\" to remind you of appointments. A book of days to record birthdays, anniversaries, and other occasions that occur on the same date every year   Detailed \"to-do\" lists and strategically placed sticky notes   Quick \"study\" sessionsBefore a gathering, review who will be there so their names will be fresh in your mind. Establish routinesFor example, keep your keys, wallet, and umbrella in the same place all the time or take medicine with your 8:00 AM glass of juice   Live a Healthy Life   Many actions that will keep your body strong will do the same for your mind. For example:   Talk to Your Doctor About Herbs and Supplements    Malnutrition and vitamin deficiencies can impair your mental function. For example, vitamin B12 deficiency can cause a range of symptoms, including confusion. But, what if your nutritional needs are being met? Can herbs and supplements still offer a benefit? Researchers have investigated a range of natural remedies, such as ginkgo , ginseng , and the supplement phosphatidylserine (PS). So far, though, the evidence is inconsistent as to whether these products can improve memory or thinking. If you are interested in taking herbs and supplements, talk to your doctor first because they may interact with other medicines that you are taking. Exercise Regularly    Among the many benefits of regular exercise are increased blood flow to the brain and decreased risk of certain diseases that can interfere with memory function. One study found that even moderate exercise has a beneficial effect. Examples of \"moderate\" exercise include:   Playing 18 holes of golf once a week, without a cart   Playing tennis twice a week   Walking one mile per day   Manage Stress    It can be tough to remember what is important when your mind is cluttered. Make time for relaxation.  Choose activities that calm you down, and make it routine. Manage Chronic Conditions    Side effects of high blood pressure , diabetes, and heart disease can interfere with mental function. Many of the lifestyle steps discussed here can help manage these conditions. Strive to eat a healthy diet, exercise regularly, get stress under control, and follow your doctor's advice for your condition. Minimize Medications    Talk to your doctor about the medicines that you take. Some may be unnecessary. Also, healthy lifestyle habits may lower the need for certain drugs. Last Reviewed: April 2010 Derik Da Silva MD   Updated: 4/13/2010     Keeping Home a [address] [facility]       As we get older, changes in balance, gait, strength, vision, hearing, and cognition make even the most youthful senior more prone to accidents. Falls are one of the leading health risks for older people. This increased risk of falling is related to:   Aging process (eg, decreased muscle strength, slowed reflexes)   Higher incidence of chronic health problems (eg, arthritis, diabetes) that may limit mobility, agility or sensory awareness   Side effects of medicine (eg, dizziness, blurred vision)especially medicines like prescription pain medicines and drugs used to treat mental health conditions   Depending on the brittleness of your bones, the consequences of a fall can be serious and long lasting. Home Life   Research by the Association of Aging [facility]) shows that some home accidents among older adults can be prevented by making simple lifestyle changes and basic modifications and repairs to the home environment. Here are some lifestyle changes that experts recommend:   Have your hearing and vision checked regularly. Be sure to wear prescription glasses that are right for you. Speak to your doctor or pharmacist about the possible side effects of your medicines. A number of medicines can cause dizziness. If you have problems with sleep, talk to your doctor.    Limit your intake of alcohol. If necessary, use a cane or walker to help maintain your balance. Wear supportive, rubber-soled shoes, even at home. If you live in a region that gets wintry weather, you may want to put special cleats on your shoes to prevent you from slipping on the snow and ice. Exercise regularly to help maintain muscle tone, agility, and balance. Always hold the banister when going up or down stairs. Also, use  bars when getting in or out of the bath or shower, or using the toilet. To avoid dizziness, get up slowly from a lying down position. Sit up first, dangling your legs for a minute or two before rising to a standing position. Overall Home Safety Check   According to the [employer] Safety [employer]'s \"Older Consumer Home Safety Checklist,\" it is important to check for potential hazards in each room. And remember, proper lighting is an essential factor in home safety. If you cannot see clearly, you are more likely to fall. Important questions to ask yourself include:   Are lamp, electric, extension, and telephone cords placed out of the flow of traffic and maintained in good condition? Have frayed cords been replaced? Are all small rugs and runners slip resistant? If not, you can secure them to the floor with a special double-sided carpet tape. Are smoke detectors properly locatedone on every floor of your home and one outside of every sleeping area? Are they in good working order? Are batteries replaced at least once a year? Do you have a well-maintained carbon monoxide detector outside every sleeping are in your home? Does your furniture layout leave plenty of space to maneuver between and around chairs, tables, beds, and sofas? Are hallways, stairs and passages between rooms well lit? Can you reach a lamp without getting out of bed? Are floor surfaces well maintained?  Shag rugs, high-pile carpeting, tile floors, and polished wood floors can be particularly slippery. Stairs should always have handrails and be carpeted or fitted with a non-skid tread. Is your telephone easily reachable. Is the cord safely tucked away? Room by Room   According to the Association of Aging, bathrooms and duane are the two most potentially hazardous rooms in your home. In the Kitchen    Be sure your stove is in proper working order and always make sure burners and the oven are off before you go out or go to sleep. Keep pots on the back burners, turn handles away from the front of the stove, and keep stove clean and free of grease build-up. Kitchen ventilation systems and range exhausts should be working properly. Keep flammable objects such as towels and pot holders away from the cooking area except when in use. Make sure kitchen curtains are tied back. Move cords and appliances away from the sink and hot surfaces. If extension cords are needed, install wiring guides so they do not hang over the sink, range, or working areas. Look for coffee pots, kettles and toaster ovens with automatic shut-offs. Keep a mop handy in the kitchen so you can wipe up spills instantly. You should also have a small fire extinguisher. Arrange your kitchen with frequently used items on lower shelves to avoid the need to stand on a stepstool to reach them. Make sure countertops are well-lit to avoid injuries while cutting and preparing food. In the Bathroom    Use a non-slip mat or decals in the tub and shower, since wet, soapy tile or porcelain surfaces are extremely slippery. Make sure bathroom rugs are non-skid or tape them firmly to the floor. Bathtubs should have at least one, preferably two, grab bars, firmly attached to structural supports in the wall. (Do not use built-in soap holders or glass shower doors as grab bars.)    Tub seats fitted with non-slip material on the legs allow you to wash sitting down.  For people with limited mobility, bathtub transfer benches allow you to slide safely into the tub. Raised toilet seats and toilet safety rails are helpful for those with knee or hip problems. In the [facility]    Make sure you use a nightlight and that the area around your bed is clear of potential obstacles. Be careful with electric blankets and never go to sleep with a heating pad, which can cause serious burns even if on a low setting. Use fire-resistant mattress covers and pillows, and NEVER smoke in bed. Keep a phone next to the bed that is programmed to dial 911 at the push of a button. If you have a chronic condition, you may want to sign on with an automatic call-in service. Typically the system includes a small pendant that connects directly to an emergency medical voice-response system. You should also make arrangements to stay in contact with someonefriend, neighbor, family memberon a regular schedule. Fire Prevention   According to the [place]. (Smoke Alarms for Every) [address] [place] [address], senior citizens are one of the two highest risk groups for death and serious injuries due to residential fires. When cooking, wear short-sleeved items, never a bulky long-sleeved robe. The [facility]'s Safety Checklist for Older Consumers emphasizes the importance of checking basements, garages, workshops and storage areas for fire hazards, such as volatile liquids, piles of old rags or clothing and overloaded circuits. Never smoke in bed or when lying down on a couch or recliner chair. Small portable electric or kerosene heaters are responsible for many home fires and should be used cautiously if at all. If you do use one, be sure to keep them away from flammable materials. In case of fire, make sure you have a pre-established emergency exit plan. Have a professional check your fireplace and other fuel-burning appliances yearly.     Helping Hands   Baby boomers entering the larkin years will continue to see the development of new products to help older adults live safely and independently in spite of age-related changes. Making Life More Livable  , by Rosa Goodman, lists over 1,000 products for \"living well in the mature years,\" such as bathing and mobility aids, household security devices, ergonomically designed knives and peelers, and faucet valves and knobs for temperature control. Medical supply stores and organizations are good sources of information about products that improve your quality of life and insure your safety.      Last Reviewed: November 2009 Padmini Tello MD   Updated: 3/7/2011

## 2022-10-21 RX ORDER — GLIPIZIDE 10 MG/1
10 TABLET ORAL DAILY
Qty: 30 TABLET | Refills: 3
Start: 2022-10-21

## 2022-10-21 RX ORDER — GLIPIZIDE AND METFORMIN HCL 5; 500 MG/1; MG/1
1 TABLET, FILM COATED ORAL
Qty: 60 TABLET | Refills: 5
Start: 2022-10-21

## 2022-12-22 ENCOUNTER — OFFICE VISIT (OUTPATIENT)
Dept: FAMILY MEDICINE CLINIC | Age: 67
End: 2022-12-22

## 2022-12-22 VITALS
HEIGHT: 72 IN | OXYGEN SATURATION: 96 % | RESPIRATION RATE: 20 BRPM | WEIGHT: 222 LBS | DIASTOLIC BLOOD PRESSURE: 72 MMHG | BODY MASS INDEX: 30.07 KG/M2 | SYSTOLIC BLOOD PRESSURE: 138 MMHG | HEART RATE: 67 BPM

## 2022-12-22 DIAGNOSIS — B37.42 CANDIDIASIS OF PENIS: ICD-10-CM

## 2022-12-22 DIAGNOSIS — E11.65 TYPE 2 DIABETES MELLITUS WITH HYPERGLYCEMIA, WITHOUT LONG-TERM CURRENT USE OF INSULIN (HCC): Primary | ICD-10-CM

## 2022-12-22 LAB — HBA1C MFR BLD: 9.2 %

## 2022-12-22 RX ORDER — LINACLOTIDE 290 UG/1
290 CAPSULE, GELATIN COATED ORAL
COMMUNITY

## 2022-12-22 RX ORDER — DOCUSATE SODIUM 100 MG/1
CAPSULE, LIQUID FILLED ORAL
COMMUNITY
Start: 2022-11-14

## 2022-12-22 RX ORDER — SITAGLIPTIN AND METFORMIN HYDROCHLORIDE 100; 1000 MG/1; MG/1
1 TABLET, FILM COATED, EXTENDED RELEASE ORAL DAILY
Qty: 30 TABLET | Refills: 3 | Status: SHIPPED | OUTPATIENT
Start: 2022-12-22

## 2022-12-22 RX ORDER — FLUCONAZOLE 150 MG/1
150 TABLET ORAL ONCE
Qty: 1 TABLET | Refills: 0 | Status: SHIPPED | OUTPATIENT
Start: 2022-12-22 | End: 2022-12-22

## 2022-12-22 RX ORDER — GLIPIZIDE 10 MG/1
20 TABLET ORAL DAILY
Qty: 60 TABLET | Refills: 3 | Status: SHIPPED | OUTPATIENT
Start: 2022-12-22

## 2022-12-22 RX ORDER — PANTOPRAZOLE SODIUM 40 MG/1
40 TABLET, DELAYED RELEASE ORAL DAILY
COMMUNITY

## 2022-12-22 RX ORDER — PANTOPRAZOLE SODIUM 40 MG/1
TABLET, DELAYED RELEASE ORAL
COMMUNITY
Start: 2022-12-08

## 2022-12-22 ASSESSMENT — ENCOUNTER SYMPTOMS
CONSTIPATION: 1
NAUSEA: 0
BLOOD IN STOOL: 0
SHORTNESS OF BREATH: 0
DIARRHEA: 0
VOMITING: 0

## 2022-12-22 NOTE — PROGRESS NOTES
OFFICE PROGRESS NOTE      SUBJECTIVE:        Patient ID:   Ford Obrien is a 78 y.o. male whopresents for   Chief Complaint   Patient presents with    Diabetes    Rash     Saw dermatology dr gave him cream and meds that caused constipation  and yeast             HPI:   Patient is here to follow up on diabetes. Fasting blood sugars:130-140 Midday blood sugars: 155-160. Patient checks blood glucose 2 times per day. Patient is following diabetic diet. Patient is a nonsmoker. Last ophthalmology visit: 1/2022. Patient is taking a daily statin. Last urine microalbumin: 10/2021. Prior to Admission medications    Medication Sig Start Date End Date Taking? Authorizing Provider   docusate sodium (COLACE) 100 MG capsule TAKE ONE CAPSULE BY MOUTH EVERY DAY 11/14/22  Yes Historical Provider, MD   pantoprazole (PROTONIX) 40 MG tablet TAKE ONE TABLET BY MOUTH EVERY MORNING 12/8/22  Yes Historical Provider, MD   linaclotide (LINZESS) 290 MCG CAPS capsule Take 290 mcg by mouth every morning (before breakfast)   Yes Historical Provider, MD   pantoprazole (PROTONIX) 40 MG tablet Take 40 mg by mouth daily   Yes Historical Provider, MD   SITagliptin-metFORMIN HCl ER (JANUMET XR) 100-1000 MG TB24 Take 1 tablet by mouth daily 12/22/22  Yes Nilsa Purvis MD   glipiZIDE (GLUCOTROL) 10 MG tablet Take 2 tablets by mouth daily 12/22/22  Yes Nilsa Purvis MD   fluconazole (DIFLUCAN) 150 MG tablet Take 1 tablet by mouth once for 1 dose 12/22/22 12/22/22 Yes Alex Reagan MD   triamcinolone (KENALOG) 0.1 % cream Apply topically 2 times daily for up to 2 weeks then as needed for flareups. 10/17/22  Yes Nilsa Purvis MD   mupirocin (BACTROBAN) 2 % ointment Apply topically 3 times daily. 7/11/22  Yes Nilsa Purvis MD   Misc.  Devices MISC Left elbow splint  Dx: medial epicondylitis 7/11/22  Yes Nilsa Purvis MD   blood glucose test strips (Chiquita Matai VI;ONE TOUCH ULTRA TEST VI) strip Check blood sugar BID 2/25/22  Yes Kelle Schilling MD   diclofenac sodium (VOLTAREN) 1 % GEL APPLY 2 GRAMS TOPICALLY TWICE DAILY  11/1/21  Yes Kelle Schilling MD   Elastic Bandages & Supports (JOBST KNEE HIGH COMPRESSION SM) MISC Knee high with 20- 30 mmhg of compression 6/3/21  Yes Lenny Medina MD   ONE TOUCH ULTRASOFT LANCETS MISC Check blood sugar BID 4/14/21  Yes Kelle Schilling MD   Blood Glucose Monitoring Suppl (ONE TOUCH ULTRA 2) w/Device KIT Check blood sugar BID 4/14/21  Yes Kelle Schilling MD   Prenatal Vit-Fe Fumarate-FA (PREPLUS) 27-1 MG TABS TAKE ONE TABLET BY MOUTH EVERY DAY 12/25/20  Yes Historical Provider, MD   melatonin 3 MG TABS tablet TAKE ONE TABLET BY MOUTH EVERY DAY 4/23/19  Yes Kelle Schilling MD   gabapentin (NEURONTIN) 300 MG capsule Take 300 mg by mouth 3 times daily. .   Yes Historical Provider, MD   omeprazole (PRILOSEC) 20 MG delayed release capsule TAKE ONE CAPSULE BY MOUTH EVERY DAY  Patient not taking: Reported on 12/22/2022 10/17/22   Kelle Schilling MD     Social History     Socioeconomic History    Marital status:      Spouse name: None    Number of children: None    Years of education: None    Highest education level: None   Tobacco Use    Smoking status: Never    Smokeless tobacco: Never   Vaping Use    Vaping Use: Never used   Substance and Sexual Activity    Alcohol use: No    Drug use: No    Sexual activity: Yes     Partners: Female     Social Determinants of Health     Food Insecurity: No Food Insecurity    Worried About Running Out of Food in the Last Year: Never true    Ran Out of Food in the Last Year: Never true   Physical Activity: Sufficiently Active    Days of Exercise per Week: 4 days    Minutes of Exercise per Session: 50 min   Stress: Stress Concern Present    Feeling of Stress :  To some extent       I have reviewed Nakia's allergies, medications, problem list, medical, social and family history and have updated as needed in the electronic medical record    Current Outpatient Medications   Medication Sig Dispense Refill    docusate sodium (COLACE) 100 MG capsule TAKE ONE CAPSULE BY MOUTH EVERY DAY      pantoprazole (PROTONIX) 40 MG tablet TAKE ONE TABLET BY MOUTH EVERY MORNING      linaclotide (LINZESS) 290 MCG CAPS capsule Take 290 mcg by mouth every morning (before breakfast)      pantoprazole (PROTONIX) 40 MG tablet Take 40 mg by mouth daily      SITagliptin-metFORMIN HCl ER (JANUMET XR) 100-1000 MG TB24 Take 1 tablet by mouth daily 30 tablet 3    glipiZIDE (GLUCOTROL) 10 MG tablet Take 2 tablets by mouth daily 60 tablet 3    fluconazole (DIFLUCAN) 150 MG tablet Take 1 tablet by mouth once for 1 dose 1 tablet 0    triamcinolone (KENALOG) 0.1 % cream Apply topically 2 times daily for up to 2 weeks then as needed for flareups. 80 g 1    mupirocin (BACTROBAN) 2 % ointment Apply topically 3 times daily. 22 g 5    Misc. Devices MISC Left elbow splint  Dx: medial epicondylitis 1 each 0    blood glucose test strips (ASCENSIA AUTODISC VI;ONE TOUCH ULTRA TEST VI) strip Check blood sugar  each 12    diclofenac sodium (VOLTAREN) 1 % GEL APPLY 2 GRAMS TOPICALLY TWICE DAILY  100 g 5    Elastic Bandages & Supports (JOBST KNEE HIGH COMPRESSION SM) MISC Knee high with 20- 30 mmhg of compression 1 each 10    ONE TOUCH ULTRASOFT LANCETS MISC Check blood sugar  each 12    Blood Glucose Monitoring Suppl (ONE TOUCH ULTRA 2) w/Device KIT Check blood sugar BID 1 kit 0    Prenatal Vit-Fe Fumarate-FA (PREPLUS) 27-1 MG TABS TAKE ONE TABLET BY MOUTH EVERY DAY      melatonin 3 MG TABS tablet TAKE ONE TABLET BY MOUTH EVERY DAY 30 tablet 3    gabapentin (NEURONTIN) 300 MG capsule Take 300 mg by mouth 3 times daily. Loreatha Press       omeprazole (PRILOSEC) 20 MG delayed release capsule TAKE ONE CAPSULE BY MOUTH EVERY DAY (Patient not taking: Reported on 12/22/2022) 30 capsule 5     No current facility-administered medications for this visit. Review Of Systems:    Review of Systems   Respiratory:  Negative for shortness of breath. Cardiovascular:  Negative for chest pain, palpitations and leg swelling. Gastrointestinal:  Positive for constipation. Negative for blood in stool, diarrhea, nausea and vomiting. Genitourinary:  Positive for penile discharge (white coating). Skin:  Positive for rash (seeing dermatologist). Psychiatric/Behavioral:  Negative for dysphoric mood. OBJECTIVE:     VS:  Wt Readings from Last 3 Encounters:   12/22/22 222 lb (100.7 kg)   10/17/22 222 lb (100.7 kg)   07/11/22 222 lb (100.7 kg)     Vitals:    12/22/22 1416   BP: 138/72   Pulse: 67   Resp: 20   SpO2: 96%       Physical Exam  Vitals reviewed. Constitutional:       General: He is not in acute distress. Appearance: He is well-developed. Neck:      Vascular: No carotid bruit. Cardiovascular:      Rate and Rhythm: Normal rate and regular rhythm. Heart sounds: Normal heart sounds. No murmur heard. No gallop. Pulmonary:      Effort: Pulmonary effort is normal.      Breath sounds: Normal breath sounds. No wheezing or rales. Abdominal:      General: Bowel sounds are normal. There is no distension. Palpations: Abdomen is soft. Tenderness: There is no abdominal tenderness. Musculoskeletal:      Cervical back: Neck supple. Right lower leg: No edema. Left lower leg: No edema. Skin:     General: Skin is warm and dry. Neurological:      Mental Status: He is alert and oriented to person, place, and time. Results for orders placed or performed in visit on 12/22/22   POCT glycosylated hemoglobin (Hb A1C)   Result Value Ref Range    Hemoglobin A1C 9.2 %         Nkaia was seen today for diabetes and rash.     Diagnoses and all orders for this visit:    Type 2 diabetes mellitus with hyperglycemia, without long-term current use of insulin ([facility] [address]  - POCT glycosylated hemoglobin (Hb A1C)  -     start SITagliptin-metFORMIN HCl ER (JANUMET XR) 100-1000 MG TB24; Take 1 tablet by mouth daily  -     increase glipiZIDE (GLUCOTROL) 10 MG tablet; Take 2 tablets by mouth daily  -     CBC; Future  -     Comprehensive Metabolic Panel; Future  -     Lipid Panel; Future  -     TSH; Future  -     Microalbumin / Creatinine Urine Ratio; Future  -     Diabetic Foot Exam    Candidiasis of penis  -     fluconazole (DIFLUCAN) 150 MG tablet; Take 1 tablet by mouth once for 1 dose          Phone/MyChart follow up if tests abnormal.    Return in about 2 months (around 2/22/2023) for diabetes. I have reviewed my findings and recommendations with Zannie Osgood.     Juanita Nugent MD, M.D

## 2023-02-10 ENCOUNTER — HOSPITAL ENCOUNTER (EMERGENCY)
Age: 68
Discharge: HOME | End: 2023-02-10
Attending: EMERGENCY MEDICINE
Payer: MEDICARE

## 2023-02-10 ENCOUNTER — APPOINTMENT (OUTPATIENT)
Dept: CT IMAGING | Age: 68
End: 2023-02-10
Payer: MEDICARE

## 2023-02-10 VITALS
SYSTOLIC BLOOD PRESSURE: 159 MMHG | RESPIRATION RATE: 18 BRPM | OXYGEN SATURATION: 98 % | BODY MASS INDEX: 29.84 KG/M2 | WEIGHT: 220 LBS | TEMPERATURE: 98.3 F | HEART RATE: 64 BPM | DIASTOLIC BLOOD PRESSURE: 83 MMHG

## 2023-02-10 DIAGNOSIS — R73.9 HYPERGLYCEMIA: ICD-10-CM

## 2023-02-10 DIAGNOSIS — K59.00 CONSTIPATION, UNSPECIFIED CONSTIPATION TYPE: Primary | ICD-10-CM

## 2023-02-10 LAB
ALBUMIN SERPL-MCNC: 4.1 G/DL (ref 3.5–5.2)
ALP BLD-CCNC: 108 U/L (ref 40–129)
ALT SERPL-CCNC: 103 U/L (ref 0–40)
ANION GAP SERPL CALCULATED.3IONS-SCNC: 11 MMOL/L (ref 7–16)
AST SERPL-CCNC: 64 U/L (ref 0–39)
BASOPHILS ABSOLUTE: 0.05 E9/L (ref 0–0.2)
BASOPHILS RELATIVE PERCENT: 0.7 % (ref 0–2)
BILIRUB SERPL-MCNC: 0.3 MG/DL (ref 0–1.2)
BILIRUBIN URINE: NEGATIVE
BLOOD, URINE: NEGATIVE
BUN BLDV-MCNC: 26 MG/DL (ref 6–23)
CALCIUM SERPL-MCNC: 9.5 MG/DL (ref 8.6–10.2)
CHLORIDE BLD-SCNC: 97 MMOL/L (ref 98–107)
CLARITY: CLEAR
CO2: 26 MMOL/L (ref 22–29)
COLOR: YELLOW
CREAT SERPL-MCNC: 1 MG/DL (ref 0.7–1.2)
EOSINOPHILS ABSOLUTE: 0.21 E9/L (ref 0.05–0.5)
EOSINOPHILS RELATIVE PERCENT: 2.8 % (ref 0–6)
GFR SERPL CREATININE-BSD FRML MDRD: >60 ML/MIN/1.73
GLUCOSE BLD-MCNC: 347 MG/DL (ref 74–99)
GLUCOSE URINE: 500 MG/DL
HCT VFR BLD CALC: 44.6 % (ref 37–54)
HEMOGLOBIN: 15 G/DL (ref 12.5–16.5)
IMMATURE GRANULOCYTES #: 0.03 E9/L
IMMATURE GRANULOCYTES %: 0.4 % (ref 0–5)
KETONES, URINE: ABNORMAL MG/DL
LACTIC ACID: 2.2 MMOL/L (ref 0.5–2.2)
LEUKOCYTE ESTERASE, URINE: NEGATIVE
LIPASE: 42 U/L (ref 13–60)
LYMPHOCYTES ABSOLUTE: 2.28 E9/L (ref 1.5–4)
LYMPHOCYTES RELATIVE PERCENT: 30 % (ref 20–42)
MAGNESIUM: 2 MG/DL (ref 1.6–2.6)
MCH RBC QN AUTO: 31.8 PG (ref 26–35)
MCHC RBC AUTO-ENTMCNC: 33.6 % (ref 32–34.5)
MCV RBC AUTO: 94.5 FL (ref 80–99.9)
MONOCYTES ABSOLUTE: 0.79 E9/L (ref 0.1–0.95)
MONOCYTES RELATIVE PERCENT: 10.4 % (ref 2–12)
NEUTROPHILS ABSOLUTE: 4.24 E9/L (ref 1.8–7.3)
NEUTROPHILS RELATIVE PERCENT: 55.7 % (ref 43–80)
NITRITE, URINE: NEGATIVE
PDW BLD-RTO: 11.8 FL (ref 11.5–15)
PH UA: 5.5 (ref 5–9)
PLATELET # BLD: 240 E9/L (ref 130–450)
PMV BLD AUTO: 11.2 FL (ref 7–12)
POTASSIUM SERPL-SCNC: 4.2 MMOL/L (ref 3.5–5)
PROTEIN UA: NEGATIVE MG/DL
RBC # BLD: 4.72 E12/L (ref 3.8–5.8)
SODIUM BLD-SCNC: 134 MMOL/L (ref 132–146)
SPECIFIC GRAVITY UA: 1.01 (ref 1–1.03)
TOTAL PROTEIN: 6.8 G/DL (ref 6.4–8.3)
UROBILINOGEN, URINE: 0.2 E.U./DL
WBC # BLD: 7.6 E9/L (ref 4.5–11.5)

## 2023-02-10 PROCEDURE — 6360000002 HC RX W HCPCS: Performed by: EMERGENCY MEDICINE

## 2023-02-10 PROCEDURE — 6360000004 HC RX CONTRAST MEDICATION: Performed by: RADIOLOGY

## 2023-02-10 PROCEDURE — 96374 THER/PROPH/DIAG INJ IV PUSH: CPT

## 2023-02-10 PROCEDURE — 83690 ASSAY OF LIPASE: CPT

## 2023-02-10 PROCEDURE — 99285 EMERGENCY DEPT VISIT HI MDM: CPT

## 2023-02-10 PROCEDURE — 81003 URINALYSIS AUTO W/O SCOPE: CPT

## 2023-02-10 PROCEDURE — 2580000003 HC RX 258: Performed by: EMERGENCY MEDICINE

## 2023-02-10 PROCEDURE — 83605 ASSAY OF LACTIC ACID: CPT

## 2023-02-10 PROCEDURE — 85025 COMPLETE CBC W/AUTO DIFF WBC: CPT

## 2023-02-10 PROCEDURE — 74177 CT ABD & PELVIS W/CONTRAST: CPT

## 2023-02-10 PROCEDURE — 83735 ASSAY OF MAGNESIUM: CPT

## 2023-02-10 PROCEDURE — 80053 COMPREHEN METABOLIC PANEL: CPT

## 2023-02-10 RX ORDER — POLYETHYLENE GLYCOL 3350 17 G/17G
17 POWDER, FOR SOLUTION ORAL 2 TIMES DAILY PRN
Qty: 527 G | Refills: 0 | Status: SHIPPED | OUTPATIENT
Start: 2023-02-10

## 2023-02-10 RX ORDER — ONDANSETRON 2 MG/ML
4 INJECTION INTRAMUSCULAR; INTRAVENOUS ONCE
Status: COMPLETED | OUTPATIENT
Start: 2023-02-10 | End: 2023-02-10

## 2023-02-10 RX ORDER — DOCUSATE SODIUM 100 MG/1
100 CAPSULE, LIQUID FILLED ORAL 2 TIMES DAILY PRN
Qty: 20 CAPSULE | Refills: 0 | Status: SHIPPED | OUTPATIENT
Start: 2023-02-10 | End: 2023-02-15

## 2023-02-10 RX ORDER — 0.9 % SODIUM CHLORIDE 0.9 %
1000 INTRAVENOUS SOLUTION INTRAVENOUS ONCE
Status: COMPLETED | OUTPATIENT
Start: 2023-02-10 | End: 2023-02-10

## 2023-02-10 RX ADMIN — SODIUM CHLORIDE 1000 ML: 9 INJECTION, SOLUTION INTRAVENOUS at 20:20

## 2023-02-10 RX ADMIN — ONDANSETRON 4 MG: 2 INJECTION INTRAMUSCULAR; INTRAVENOUS at 20:21

## 2023-02-10 RX ADMIN — IOPAMIDOL 70 ML: 755 INJECTION, SOLUTION INTRAVENOUS at 21:41

## 2023-02-10 ASSESSMENT — ENCOUNTER SYMPTOMS
WHEEZING: 0
CONSTIPATION: 1
BACK PAIN: 0
SHORTNESS OF BREATH: 0
NAUSEA: 0
CHEST TIGHTNESS: 0
COUGH: 0
ABDOMINAL DISTENTION: 0
SORE THROAT: 0
VOMITING: 0
ABDOMINAL PAIN: 1
DIARRHEA: 0

## 2023-02-10 ASSESSMENT — PAIN DESCRIPTION - LOCATION: LOCATION: ABDOMEN

## 2023-02-10 ASSESSMENT — PAIN DESCRIPTION - ORIENTATION: ORIENTATION: UPPER

## 2023-02-10 ASSESSMENT — PAIN - FUNCTIONAL ASSESSMENT: PAIN_FUNCTIONAL_ASSESSMENT: 0-10

## 2023-02-10 ASSESSMENT — PAIN SCALES - GENERAL: PAINLEVEL_OUTOF10: 7

## 2023-02-10 ASSESSMENT — PAIN DESCRIPTION - PAIN TYPE: TYPE: ACUTE PAIN;CHRONIC PAIN

## 2023-02-11 NOTE — ED PROVIDER NOTES
Chief complaint:  Abdominal pain    HPI history provided by the patient  Patient comes in complaining of abdominal pain for the last couple of days, no nausea vomiting or diarrhea. Denies constipation states he is moving his bowels, but does have a history of constipation and follows with GI and is on medication for it. No chest pain, palpitation or shortness of breath or lightheadedness or syncope. No back or flank pain and no hematuria or dysuria. Review of Systems   Constitutional:  Negative for chills, diaphoresis, fatigue and fever. HENT:  Negative for congestion and sore throat. Respiratory:  Negative for cough, chest tightness, shortness of breath and wheezing. Cardiovascular:  Negative for chest pain, palpitations and leg swelling. Gastrointestinal:  Positive for abdominal pain and constipation. Negative for abdominal distention, diarrhea, nausea and vomiting. Genitourinary:  Negative for dysuria, flank pain, frequency, hematuria and urgency. Musculoskeletal:  Negative for arthralgias, back pain, gait problem, joint swelling, myalgias, neck pain and neck stiffness. Skin:  Negative for rash and wound. Neurological:  Negative for dizziness, seizures, syncope, weakness, light-headedness, numbness and headaches. All other systems reviewed and are negative. Physical Exam  Vitals and nursing note reviewed. Constitutional:       General: He is awake. He is not in acute distress. Appearance: He is well-developed. He is not ill-appearing, toxic-appearing or diaphoretic. HENT:      Head: Normocephalic and atraumatic. Eyes:      General: No scleral icterus. Pupils: Pupils are equal, round, and reactive to light. Cardiovascular:      Rate and Rhythm: Normal rate and regular rhythm. Heart sounds: Normal heart sounds. No murmur heard. Pulmonary:      Effort: Pulmonary effort is normal. No respiratory distress. Breath sounds: Normal breath sounds.  No stridor, decreased air movement or transmitted upper airway sounds. No decreased breath sounds, wheezing, rhonchi or rales. Chest:      Chest wall: No tenderness. Abdominal:      General: Bowel sounds are normal. There is no distension. Palpations: Abdomen is soft. Tenderness: There is abdominal tenderness. There is no right CVA tenderness, left CVA tenderness, guarding or rebound. Comments: Abdomen soft with mild vague diffuse mid and upper abdominal tenderness with no focality and no guarding, rebound tenderness or rigidity. No CVA tenderness. No jaundice or icterus. Musculoskeletal:         General: No swelling, tenderness, deformity or signs of injury. Cervical back: Full passive range of motion without pain, normal range of motion and neck supple. No signs of trauma or rigidity. No spinous process tenderness or muscular tenderness. Normal range of motion. Right lower leg: No edema. Left lower leg: No edema. Comments: Arms and legs are neurovascular intact with no pretibial edema or calf pain. Skin:     General: Skin is warm and dry. Coloration: Skin is not cyanotic, jaundiced, mottled or pale. Findings: No bruising, erythema or rash. Neurological:      General: No focal deficit present. Mental Status: He is alert and oriented to person, place, and time. GCS: GCS eye subscore is 4. GCS verbal subscore is 5. GCS motor subscore is 6. Cranial Nerves: Cranial nerves 2-12 are intact. No cranial nerve deficit. Sensory: Sensation is intact. Motor: Motor function is intact. Coordination: Coordination is intact. Coordination normal.   Psychiatric:         Behavior: Behavior is cooperative. Procedures     MDM     History provided by:  The patient  Social factors affecting care: None  Chronic conditions affecting care: Constipation  Chart reviewed: None    Differential includes but not limited to: Constipation, bowel obstruction, pancreatitis, cholelithiasis, biliary disease, diverticulitis, appendicitis, pyelonephritis, UTI, abdominal pain    Work up includes with interpretations: Basic metabolic panel unremarkable, sugar 347 BUN 26 otherwise unremarkable. LFTs  with AST 64 otherwise unremarkable. CBC normal white count 7.6 with hemoglobin 15 hematocrit 44.6. No sign of acute large infection, no obvious sign of biliary disease. Urinalysis with no UTI. CT abdomen pelvis read by radiologist shows mild stool in the colon with no obvious ileus or obstruction. Normal appendix. No acute process. Not representative of acute biliary hepatic or pancreatic disease. Advanced directive discussion: None    Treatment in ER: None    Consultations in ER: None    Diagnosis and disposition: Constipation, stable for discharge and outpatient follow-up    10:59 PM EST  Patient sitting the chair resting comfortably no distress. Exam unchanged. Updated on work-up results as well as close outpatient follow-up and treatment, he is very comfortable with that at this time. --------------------------------------------- PAST HISTORY ---------------------------------------------  Past Medical History:  has a past medical history of BPH (benign prostatic hyperplasia), Chronic back pain, DDD (degenerative disc disease), cervical, Depression, Fibromyalgia, GERD (gastroesophageal reflux disease), Headache, Hepatitis C, Irritable bowel syndrome, Joint pain, Memory difficulties, Neck pain, Neuropathy of foot, Osteoarthritis, Type II or unspecified type diabetes mellitus without mention of complication, not stated as uncontrolled, Varicose veins of bilateral lower extremities with pain, and Varicose veins of leg with pain, right. Past Surgical History:  has a past surgical history that includes Abscess Drainage; Foot surgery (1998); Mouth surgery;  Endoscopy, colon, diagnostic; Colonoscopy; Varicose vein surgery; and Knee arthroscopy (Left, 11/04/2016). Social History:  reports that he has never smoked. He has never used smokeless tobacco. He reports that he does not drink alcohol and does not use drugs. Family History: family history is not on file. The patients home medications have been reviewed.     Allergies: Celebrex [celecoxib], Lipitor [atorvastatin], and Nexium [esomeprazole magnesium trihydrate]    -------------------------------------------------- RESULTS -------------------------------------------------  Labs:  Results for orders placed or performed during the hospital encounter of 02/10/23   CBC with Auto Differential   Result Value Ref Range    WBC 7.6 4.5 - 11.5 E9/L    RBC 4.72 3.80 - 5.80 E12/L    Hemoglobin 15.0 12.5 - 16.5 g/dL    Hematocrit 44.6 37.0 - 54.0 %    MCV 94.5 80.0 - 99.9 fL    MCH 31.8 26.0 - 35.0 pg    MCHC 33.6 32.0 - 34.5 %    RDW 11.8 11.5 - 15.0 fL    Platelets 541 059 - 944 E9/L    MPV 11.2 7.0 - 12.0 fL    Neutrophils % 55.7 43.0 - 80.0 %    Immature Granulocytes % 0.4 0.0 - 5.0 %    Lymphocytes % 30.0 20.0 - 42.0 %    Monocytes % 10.4 2.0 - 12.0 %    Eosinophils % 2.8 0.0 - 6.0 %    Basophils % 0.7 0.0 - 2.0 %    Neutrophils Absolute 4.24 1.80 - 7.30 E9/L    Immature Granulocytes # 0.03 E9/L    Lymphocytes Absolute 2.28 1.50 - 4.00 E9/L    Monocytes Absolute 0.79 0.10 - 0.95 E9/L    Eosinophils Absolute 0.21 0.05 - 0.50 E9/L    Basophils Absolute 0.05 0.00 - 0.20 E9/L   Comprehensive Metabolic Panel   Result Value Ref Range    Sodium 134 132 - 146 mmol/L    Potassium 4.2 3.5 - 5.0 mmol/L    Chloride 97 (L) 98 - 107 mmol/L    CO2 26 22 - 29 mmol/L    Anion Gap 11 7 - 16 mmol/L    Glucose 347 (H) 74 - 99 mg/dL    BUN 26 (H) 6 - 23 mg/dL    Creatinine 1.0 0.7 - 1.2 mg/dL    Est, Glom Filt Rate >60 >=60 mL/min/1.73    Calcium 9.5 8.6 - 10.2 mg/dL    Total Protein 6.8 6.4 - 8.3 g/dL    Albumin 4.1 3.5 - 5.2 g/dL    Total Bilirubin 0.3 0.0 - 1.2 mg/dL    Alkaline Phosphatase 108 40 - 129 U/L     (H) 0 - 40 U/L    AST 64 (H) 0 - 39 U/L   Magnesium   Result Value Ref Range    Magnesium 2.0 1.6 - 2.6 mg/dL   Lactic Acid   Result Value Ref Range    Lactic Acid 2.2 0.5 - 2.2 mmol/L   Lipase   Result Value Ref Range    Lipase 42 13 - 60 U/L   Urinalysis   Result Value Ref Range    Color, UA Yellow Straw/Yellow    Clarity, UA Clear Clear    Glucose, Ur 500 (A) Negative mg/dL    Bilirubin Urine Negative Negative    Ketones, Urine TRACE (A) Negative mg/dL    Specific Gravity, UA 1.015 1.005 - 1.030    Blood, Urine Negative Negative    pH, UA 5.5 5.0 - 9.0    Protein, UA Negative Negative mg/dL    Urobilinogen, Urine 0.2 <2.0 E.U./dL    Nitrite, Urine Negative Negative    Leukocyte Esterase, Urine Negative Negative       Radiology:  CT ABDOMEN PELVIS W IV CONTRAST Additional Contrast? Oral   Final Result   1. Mild stool volume in the colon, though no ileus or obstruction. Normal   appendix. 2. Prostate enlargement. 3. Small bilateral nonobstructive renal calculi. No ureteral calculi or   findings to suggest a recently passed stone. 4. Fatty liver infiltration. 5. Other incidental findings as above.             ------------------------- NURSING NOTES AND VITALS REVIEWED ---------------------------  Date / Time Roomed:  2/10/2023  7:50 PM  ED Bed Assignment:  14/14    The nursing notes within the ED encounter and vital signs as below have been reviewed. BP (!) 159/83   Pulse 64   Temp 98.3 °F (36.8 °C) (Oral)   Resp 18   Wt 220 lb (99.8 kg)   SpO2 98%   BMI 29.84 kg/m²   Oxygen Saturation Interpretation: Normal      ------------------------------------------ PROGRESS NOTES ------------------------------------------  I have spoken with the patient and discussed todays results, in addition to providing specific details for the plan of care and counseling regarding the diagnosis and prognosis. Their questions are answered at this time and they are agreeable with the plan.  I discussed at length with them reasons for immediate return here for re evaluation. They will followup with primary care by calling their office tomorrow. --------------------------------- ADDITIONAL PROVIDER NOTES ---------------------------------  At this time the patient is without objective evidence of an acute process requiring hospitalization or inpatient management. They have remained hemodynamically stable throughout their entire ED visit and are stable for discharge with outpatient follow-up. The plan has been discussed in detail and they are aware of the specific conditions for emergent return, as well as the importance of follow-up. New Prescriptions    DOCUSATE SODIUM (COLACE) 100 MG CAPSULE    Take 1 capsule by mouth 2 times daily as needed for Constipation    POLYETHYLENE GLYCOL (MIRALAX) 17 G PACKET    Take 17 g by mouth 2 times daily as needed for Constipation       Diagnosis:  1. Constipation, unspecified constipation type    2. Hyperglycemia        Disposition:  Patient's disposition: Discharge to home  Patient's condition is stable.             Kimmie Turner DO  02/10/23 [id number]

## 2023-02-22 ENCOUNTER — OFFICE VISIT (OUTPATIENT)
Dept: FAMILY MEDICINE CLINIC | Age: 68
End: 2023-02-22

## 2023-02-22 VITALS
WEIGHT: 221 LBS | OXYGEN SATURATION: 96 % | HEIGHT: 72 IN | HEART RATE: 65 BPM | DIASTOLIC BLOOD PRESSURE: 74 MMHG | BODY MASS INDEX: 29.93 KG/M2 | RESPIRATION RATE: 20 BRPM | SYSTOLIC BLOOD PRESSURE: 124 MMHG

## 2023-02-22 DIAGNOSIS — B18.2 CHRONIC HEPATITIS C WITHOUT HEPATIC COMA (HCC): ICD-10-CM

## 2023-02-22 DIAGNOSIS — R97.20 ELEVATED PSA: ICD-10-CM

## 2023-02-22 DIAGNOSIS — E11.65 TYPE 2 DIABETES MELLITUS WITH HYPERGLYCEMIA, WITHOUT LONG-TERM CURRENT USE OF INSULIN (HCC): Primary | ICD-10-CM

## 2023-02-22 DIAGNOSIS — Z12.5 ENCOUNTER FOR SCREENING FOR MALIGNANT NEOPLASM OF PROSTATE: ICD-10-CM

## 2023-02-22 LAB — HBA1C MFR BLD: 9.4 %

## 2023-02-22 RX ORDER — TENAPANOR HYDROCHLORIDE 53.2 MG/1
50 TABLET ORAL
COMMUNITY

## 2023-02-22 RX ORDER — BLOOD-GLUCOSE METER
EACH MISCELLANEOUS
Qty: 1 KIT | Refills: 0 | Status: SHIPPED | OUTPATIENT
Start: 2023-02-22

## 2023-02-22 RX ORDER — LANCETS
EACH MISCELLANEOUS
Qty: 100 EACH | Refills: 12 | Status: SHIPPED | OUTPATIENT
Start: 2023-02-22

## 2023-02-22 RX ORDER — PANTOPRAZOLE SODIUM 40 MG/1
TABLET, DELAYED RELEASE ORAL
Qty: 30 TABLET | Refills: 5 | Status: SHIPPED | OUTPATIENT
Start: 2023-02-22

## 2023-02-22 SDOH — ECONOMIC STABILITY: FOOD INSECURITY: WITHIN THE PAST 12 MONTHS, THE FOOD YOU BOUGHT JUST DIDN'T LAST AND YOU DIDN'T HAVE MONEY TO GET MORE.: PATIENT DECLINED

## 2023-02-22 SDOH — ECONOMIC STABILITY: INCOME INSECURITY: HOW HARD IS IT FOR YOU TO PAY FOR THE VERY BASICS LIKE FOOD, HOUSING, MEDICAL CARE, AND HEATING?: PATIENT DECLINED

## 2023-02-22 SDOH — ECONOMIC STABILITY: HOUSING INSECURITY
IN THE LAST 12 MONTHS, WAS THERE A TIME WHEN YOU DID NOT HAVE A STEADY PLACE TO SLEEP OR SLEPT IN A SHELTER (INCLUDING NOW)?: PATIENT REFUSED

## 2023-02-22 SDOH — ECONOMIC STABILITY: FOOD INSECURITY: WITHIN THE PAST 12 MONTHS, YOU WORRIED THAT YOUR FOOD WOULD RUN OUT BEFORE YOU GOT MONEY TO BUY MORE.: PATIENT DECLINED

## 2023-02-22 ASSESSMENT — PATIENT HEALTH QUESTIONNAIRE - PHQ9
2. FEELING DOWN, DEPRESSED OR HOPELESS: 0
SUM OF ALL RESPONSES TO PHQ QUESTIONS 1-9: 1
SUM OF ALL RESPONSES TO PHQ QUESTIONS 1-9: 1
SUM OF ALL RESPONSES TO PHQ9 QUESTIONS 1 & 2: 1
1. LITTLE INTEREST OR PLEASURE IN DOING THINGS: 1
SUM OF ALL RESPONSES TO PHQ QUESTIONS 1-9: 1
SUM OF ALL RESPONSES TO PHQ QUESTIONS 1-9: 1

## 2023-02-22 ASSESSMENT — ENCOUNTER SYMPTOMS
DIARRHEA: 0
SHORTNESS OF BREATH: 1
VOMITING: 0
NAUSEA: 1

## 2023-02-22 NOTE — PROGRESS NOTES
OFFICE PROGRESS NOTE      SUBJECTIVE:        Patient ID:   Ashish Hurt is a 78 y.o. male whopresents for   Chief Complaint   Patient presents with    Diabetes           HPI:   Patient is here to follow up on diabetes. Fasting blood sugars:110's Midday blood sugars: not checking. Patient checks blood glucose 1 times per day. Patient is following diabetic diet. Patient is a nonsmoker. Last ophthalmology visit: 1/2022. Patient unable to tolerate daily statin. Patient has history of chronic hepatitis C. Has been stable. Prior to Admission medications    Medication Sig Start Date End Date Taking?  Authorizing Provider   SITagliptin-metFORMIN HCl ER (JANUMET XR) 100-1000 MG TB24 Take 1 tablet by mouth daily 2/24/23  Yes Pratik Arias MD   glipiZIDE (GLUCOTROL) 10 MG tablet Take 2 tablets by mouth daily 2/24/23  Yes Pratik Arias MD   pantoprazole (PROTONIX) 40 MG tablet TAKE ONE TABLET BY MOUTH EVERY MORNING 2/22/23  Yes Pratik Arias MD   Tenapanor HCl (IBSRELA) 50 MG TABS Take 50 mg by mouth   Yes Historical Provider, MD   empagliflozin (JARDIANCE) 10 MG tablet Take 1 tablet by mouth daily 2/22/23  Yes Pratik Arias MD   blood glucose test strips (ASCENSIA AUTODISC VI;ONE TOUCH ULTRA TEST VI) strip Check blood sugar BID 2/22/23  Yes Pratik Arias MD   Blood Glucose Monitoring Suppl (ONE TOUCH ULTRA 2) w/Device KIT Check blood sugar BID 2/22/23  Yes Pratik Arias MD   ONE TOUCH ULTRASOFT LANCETS MISC Check blood sugar BID 2/22/23  Yes Pratik Arias MD   polyethylene glycol (MIRALAX) 17 g packet Take 17 g by mouth 2 times daily as needed for Constipation 2/10/23  Yes [place] Button Cardinal, DO   linaclotide (LINZESS) 290 MCG CAPS capsule Take 290 mcg by mouth every morning (before breakfast)   Yes Historical Provider, MD   triamcinolone (KENALOG) 0.1 % cream Apply topically 2 times daily for up to 2 weeks then as needed for flareups. 10/17/22  Yes Darien Holcomb MD   mupirocin (BACTROBAN) 2 % ointment Apply topically 3 times daily. 7/11/22  Yes Darien Holcomb MD   Misc. Devices MISC Left elbow splint  Dx: medial epicondylitis 7/11/22  Yes Darien Holcomb MD   diclofenac sodium (VOLTAREN) 1 % GEL APPLY 2 GRAMS TOPICALLY TWICE DAILY  11/1/21  Yes Darien Holcomb MD   Elastic Bandages & Supports (JOBST KNEE HIGH COMPRESSION SM) MISC Knee high with 20- 30 mmhg of compression 6/3/21  Yes Imani Marion MD   Prenatal Vit-Fe Fumarate-FA (PREPLUS) 27-1 MG TABS TAKE ONE TABLET BY MOUTH EVERY DAY 12/25/20  Yes Trenton Foster MD   melatonin 3 MG TABS tablet TAKE ONE TABLET BY MOUTH EVERY DAY 4/23/19  Yes Darien Holcomb MD     Social History     Socioeconomic History    Marital status:      Spouse name: None    Number of children: None    Years of education: None    Highest education level: None   Tobacco Use    Smoking status: Never    Smokeless tobacco: Never   Vaping Use    Vaping Use: Never used   Substance and Sexual Activity    Alcohol use: No    Drug use: No    Sexual activity: Yes     Partners: Female     Social Determinants of Health     Financial Resource Strain: Unknown    Difficulty of Paying Living Expenses: Patient refused   Food Insecurity: Unknown    Worried About Running Out of Food in the Last Year: Patient refused    Ran Out of Food in the Last Year: Patient refused   Transportation Needs: Unknown    Lack of Transportation (Non-Medical): Patient refused   Physical Activity: Sufficiently Active    Days of Exercise per Week: 4 days    Minutes of Exercise per Session: 50 min   Stress: Stress Concern Present    Feeling of Stress :  To some extent   Housing Stability: Unknown    Unstable Housing in the Last Year: Patient refused       I have reviewed Srinivasans allergies, medications, problem list, medical, social and family history and have updated as needed in the electronic medical record    Current Outpatient Medications   Medication Sig Dispense Refill    SITagliptin-metFORMIN HCl ER (JANUMET XR) 100-1000 MG TB24 Take 1 tablet by mouth daily 30 tablet 3    glipiZIDE (GLUCOTROL) 10 MG tablet Take 2 tablets by mouth daily 60 tablet 3    pantoprazole (PROTONIX) 40 MG tablet TAKE ONE TABLET BY MOUTH EVERY MORNING 30 tablet 5    Tenapanor HCl (IBSRELA) 50 MG TABS Take 50 mg by mouth      empagliflozin (JARDIANCE) 10 MG tablet Take 1 tablet by mouth daily 30 tablet 3    blood glucose test strips (ASCENSIA AUTODISC VI;ONE TOUCH ULTRA TEST VI) strip Check blood sugar  each 12    Blood Glucose Monitoring Suppl (ONE TOUCH ULTRA 2) w/Device KIT Check blood sugar BID 1 kit 0    ONE TOUCH ULTRASOFT LANCETS MISC Check blood sugar  each 12    polyethylene glycol (MIRALAX) 17 g packet Take 17 g by mouth 2 times daily as needed for Constipation 527 g 0    linaclotide (LINZESS) 290 MCG CAPS capsule Take 290 mcg by mouth every morning (before breakfast)      triamcinolone (KENALOG) 0.1 % cream Apply topically 2 times daily for up to 2 weeks then as needed for flareups. 80 g 1    mupirocin (BACTROBAN) 2 % ointment Apply topically 3 times daily. 22 g 5    Misc. Devices MISC Left elbow splint  Dx: medial epicondylitis 1 each 0    diclofenac sodium (VOLTAREN) 1 % GEL APPLY 2 GRAMS TOPICALLY TWICE DAILY  100 g 5    Elastic Bandages & Supports (JOBST KNEE HIGH COMPRESSION SM) MISC Knee high with 20- 30 mmhg of compression 1 each 10    Prenatal Vit-Fe Fumarate-FA (PREPLUS) 27-1 MG TABS TAKE ONE TABLET BY MOUTH EVERY DAY      melatonin 3 MG TABS tablet TAKE ONE TABLET BY MOUTH EVERY DAY 30 tablet 3     No current facility-administered medications for this visit. Review Of Systems:    Review of Systems   Constitutional:  Positive for fatigue. Eyes:  Negative for visual disturbance. Respiratory:  Positive for shortness of breath (with exertion). Cardiovascular:  Negative for chest pain, palpitations and leg swelling. Gastrointestinal:  Positive for nausea (comes and goes). Negative for diarrhea and vomiting. Genitourinary:  Negative for difficulty urinating, dysuria and frequency. Skin:  Positive for rash (on neck for past 2 weeks). Psychiatric/Behavioral:  Negative for dysphoric mood. OBJECTIVE:     VS:  Wt Readings from Last 3 Encounters:   02/22/23 221 lb (100.2 kg)   02/10/23 220 lb (99.8 kg)   12/22/22 222 lb (100.7 kg)     Vitals:    02/22/23 1306   BP: 124/74   Pulse: 65   Resp: 20   SpO2: 96%       Physical Exam  Vitals reviewed. Constitutional:       General: He is not in acute distress. Appearance: He is well-developed. Neck:      Vascular: No carotid bruit. Cardiovascular:      Rate and Rhythm: Normal rate and regular rhythm. Heart sounds: Normal heart sounds. No murmur heard. No gallop. Pulmonary:      Effort: Pulmonary effort is normal.      Breath sounds: Normal breath sounds. No wheezing or rales. Abdominal:      General: Bowel sounds are normal. There is no distension. Palpations: Abdomen is soft. Tenderness: There is no abdominal tenderness. Musculoskeletal:      Cervical back: Neck supple. Right lower leg: No edema. Left lower leg: No edema. Skin:     General: Skin is warm and dry. Neurological:      Mental Status: He is alert and oriented to person, place, and time. Results for orders placed or performed in visit on 02/22/23   POCT glycosylated hemoglobin (Hb A1C)   Result Value Ref Range    Hemoglobin A1C 9.4 %         Nakia was seen today for diabetes. Diagnoses and all orders for this visit:    Type 2 diabetes mellitus with hyperglycemia, without long-term current use of insulin ([facility])  -     POCT glycosylated hemoglobin (Hb A1C)  -     empagliflozin (JARDIANCE) 10 MG tablet;  Take 1 tablet by mouth daily  -     blood glucose test strips (ASCENSIA AUTODISC VI;ONE TOUCH ULTRA TEST VI) strip; Check blood sugar BID  -     Blood Glucose Monitoring Suppl (ONE TOUCH ULTRA 2) w/Device KIT; Check blood sugar BID  -     ONE TOUCH ULTRASOFT LANCETS MISC; Check blood sugar BID  -     CBC; Future  -     Comprehensive Metabolic Panel; Future  -     TSH; Future  -     Lipid Panel; Future  -     SITagliptin-metFORMIN HCl ER (JANUMET XR) 100-1000 MG TB24; Take 1 tablet by mouth daily  -     glipiZIDE (GLUCOTROL) 10 MG tablet; Take 2 tablets by mouth daily        -     improve diet    Chronic hepatitis C without hepatic coma (HCC)         -     Stable; will assess yearly    Elevated PSA  -     PSA Screening; Future    Encounter for screening for malignant neoplasm of prostate   -     PSA Screening; Future          Phone/MyChart follow up if tests abnormal.    Return in about 2 months (around 4/22/2023) for diabetes. I have reviewed my findings and recommendations with Abdirahman Hernandez.     Patric Chapin MD, M.D

## 2023-02-24 RX ORDER — SITAGLIPTIN AND METFORMIN HYDROCHLORIDE 1000; 100 MG/1; MG/1
1 TABLET, FILM COATED, EXTENDED RELEASE ORAL DAILY
Qty: 30 TABLET | Refills: 3 | Status: SHIPPED
Start: 2023-02-24

## 2023-02-24 RX ORDER — GLIPIZIDE 10 MG/1
20 TABLET ORAL DAILY
Qty: 60 TABLET | Refills: 3 | Status: SHIPPED
Start: 2023-02-24

## 2023-03-20 ENCOUNTER — APPOINTMENT (OUTPATIENT)
Dept: GENERAL RADIOLOGY | Age: 68
DRG: 282 | End: 2023-03-20
Payer: MEDICARE

## 2023-03-20 ENCOUNTER — HOSPITAL ENCOUNTER (INPATIENT)
Age: 68
LOS: 1 days | Discharge: ANOTHER HOSPITAL | DRG: 282 | End: 2023-03-20
Attending: EMERGENCY MEDICINE | Admitting: INTERNAL MEDICINE
Payer: MEDICARE

## 2023-03-20 VITALS
HEIGHT: 72 IN | OXYGEN SATURATION: 95 % | TEMPERATURE: 97.8 F | HEART RATE: 67 BPM | RESPIRATION RATE: 24 BRPM | SYSTOLIC BLOOD PRESSURE: 125 MMHG | BODY MASS INDEX: 30.48 KG/M2 | DIASTOLIC BLOOD PRESSURE: 77 MMHG | WEIGHT: 225 LBS

## 2023-03-20 DIAGNOSIS — R73.9 HYPERGLYCEMIA: ICD-10-CM

## 2023-03-20 DIAGNOSIS — R07.9 CHEST PAIN, UNSPECIFIED TYPE: Primary | ICD-10-CM

## 2023-03-20 LAB
ALBUMIN SERPL-MCNC: 4.3 G/DL (ref 3.5–5.2)
ALP SERPL-CCNC: 114 U/L (ref 40–129)
ALT SERPL-CCNC: 133 U/L (ref 0–40)
ANION GAP SERPL CALCULATED.3IONS-SCNC: 11 MMOL/L (ref 7–16)
APTT BLD: 31.9 SEC (ref 24.5–35.1)
AST SERPL-CCNC: 88 U/L (ref 0–39)
BASOPHILS # BLD: 0.06 E9/L (ref 0–0.2)
BASOPHILS NFR BLD: 0.9 % (ref 0–2)
BILIRUB SERPL-MCNC: 0.6 MG/DL (ref 0–1.2)
BUN SERPL-MCNC: 24 MG/DL (ref 6–23)
CALCIUM SERPL-MCNC: 10 MG/DL (ref 8.6–10.2)
CHLORIDE SERPL-SCNC: 96 MMOL/L (ref 98–107)
CO2 SERPL-SCNC: 28 MMOL/L (ref 22–29)
CREAT SERPL-MCNC: 1.1 MG/DL (ref 0.7–1.2)
EOSINOPHIL # BLD: 0.1 E9/L (ref 0.05–0.5)
EOSINOPHIL NFR BLD: 1.4 % (ref 0–6)
ERYTHROCYTE [DISTWIDTH] IN BLOOD BY AUTOMATED COUNT: 11.6 FL (ref 11.5–15)
ERYTHROCYTE [DISTWIDTH] IN BLOOD BY AUTOMATED COUNT: 11.8 FL (ref 11.5–15)
GLUCOSE SERPL-MCNC: 372 MG/DL (ref 74–99)
HBA1C MFR BLD: 9.2 % (ref 4–5.6)
HCT VFR BLD AUTO: 42.3 % (ref 37–54)
HCT VFR BLD AUTO: 47.6 % (ref 37–54)
HGB BLD-MCNC: 15 G/DL (ref 12.5–16.5)
HGB BLD-MCNC: 15.7 G/DL (ref 12.5–16.5)
IMM GRANULOCYTES # BLD: 0.04 E9/L
IMM GRANULOCYTES NFR BLD: 0.6 % (ref 0–5)
LYMPHOCYTES # BLD: 1.26 E9/L (ref 1.5–4)
LYMPHOCYTES NFR BLD: 18.2 % (ref 20–42)
MAGNESIUM SERPL-MCNC: 1.9 MG/DL (ref 1.6–2.6)
MCH RBC QN AUTO: 31.5 PG (ref 26–35)
MCH RBC QN AUTO: 32.5 PG (ref 26–35)
MCHC RBC AUTO-ENTMCNC: 33 % (ref 32–34.5)
MCHC RBC AUTO-ENTMCNC: 35.5 % (ref 32–34.5)
MCV RBC AUTO: 91.8 FL (ref 80–99.9)
MCV RBC AUTO: 95.4 FL (ref 80–99.9)
METER GLUCOSE: 218 MG/DL (ref 74–99)
METER GLUCOSE: 256 MG/DL (ref 74–99)
MONOCYTES # BLD: 0.46 E9/L (ref 0.1–0.95)
MONOCYTES NFR BLD: 6.6 % (ref 2–12)
NEUTROPHILS # BLD: 5.02 E9/L (ref 1.8–7.3)
NEUTS SEG NFR BLD: 72.3 % (ref 43–80)
PLATELET # BLD AUTO: 215 E9/L (ref 130–450)
PLATELET # BLD AUTO: 229 E9/L (ref 130–450)
PMV BLD AUTO: 11.1 FL (ref 7–12)
PMV BLD AUTO: 11.6 FL (ref 7–12)
POTASSIUM SERPL-SCNC: 4.1 MMOL/L (ref 3.5–5)
PROT SERPL-MCNC: 7.1 G/DL (ref 6.4–8.3)
RBC # BLD AUTO: 4.61 E12/L (ref 3.8–5.8)
RBC # BLD AUTO: 4.99 E12/L (ref 3.8–5.8)
SODIUM SERPL-SCNC: 135 MMOL/L (ref 132–146)
TROPONIN, HIGH SENSITIVITY: 1086 NG/L (ref 0–11)
TROPONIN, HIGH SENSITIVITY: 11 NG/L (ref 0–11)
TROPONIN, HIGH SENSITIVITY: 16 NG/L (ref 0–11)
TROPONIN, HIGH SENSITIVITY: 859 NG/L (ref 0–11)
WBC # BLD: 10.3 E9/L (ref 4.5–11.5)
WBC # BLD: 6.9 E9/L (ref 4.5–11.5)

## 2023-03-20 PROCEDURE — 99285 EMERGENCY DEPT VISIT HI MDM: CPT

## 2023-03-20 PROCEDURE — 84484 ASSAY OF TROPONIN QUANT: CPT

## 2023-03-20 PROCEDURE — 83735 ASSAY OF MAGNESIUM: CPT

## 2023-03-20 PROCEDURE — 6370000000 HC RX 637 (ALT 250 FOR IP): Performed by: INTERNAL MEDICINE

## 2023-03-20 PROCEDURE — 93005 ELECTROCARDIOGRAM TRACING: CPT | Performed by: STUDENT IN AN ORGANIZED HEALTH CARE EDUCATION/TRAINING PROGRAM

## 2023-03-20 PROCEDURE — 85027 COMPLETE CBC AUTOMATED: CPT

## 2023-03-20 PROCEDURE — 2580000003 HC RX 258: Performed by: STUDENT IN AN ORGANIZED HEALTH CARE EDUCATION/TRAINING PROGRAM

## 2023-03-20 PROCEDURE — 96374 THER/PROPH/DIAG INJ IV PUSH: CPT

## 2023-03-20 PROCEDURE — 2500000003 HC RX 250 WO HCPCS: Performed by: INTERNAL MEDICINE

## 2023-03-20 PROCEDURE — 93005 ELECTROCARDIOGRAM TRACING: CPT | Performed by: INTERNAL MEDICINE

## 2023-03-20 PROCEDURE — 83036 HEMOGLOBIN GLYCOSYLATED A1C: CPT

## 2023-03-20 PROCEDURE — A4216 STERILE WATER/SALINE, 10 ML: HCPCS | Performed by: STUDENT IN AN ORGANIZED HEALTH CARE EDUCATION/TRAINING PROGRAM

## 2023-03-20 PROCEDURE — 6370000000 HC RX 637 (ALT 250 FOR IP): Performed by: STUDENT IN AN ORGANIZED HEALTH CARE EDUCATION/TRAINING PROGRAM

## 2023-03-20 PROCEDURE — 6360000002 HC RX W HCPCS: Performed by: INTERNAL MEDICINE

## 2023-03-20 PROCEDURE — 36415 COLL VENOUS BLD VENIPUNCTURE: CPT

## 2023-03-20 PROCEDURE — 6370000000 HC RX 637 (ALT 250 FOR IP): Performed by: NURSE PRACTITIONER

## 2023-03-20 PROCEDURE — 2000000000 HC ICU R&B

## 2023-03-20 PROCEDURE — 85025 COMPLETE CBC W/AUTO DIFF WBC: CPT

## 2023-03-20 PROCEDURE — 71045 X-RAY EXAM CHEST 1 VIEW: CPT

## 2023-03-20 PROCEDURE — 82962 GLUCOSE BLOOD TEST: CPT

## 2023-03-20 PROCEDURE — 80053 COMPREHEN METABOLIC PANEL: CPT

## 2023-03-20 PROCEDURE — 85730 THROMBOPLASTIN TIME PARTIAL: CPT

## 2023-03-20 PROCEDURE — 2500000003 HC RX 250 WO HCPCS: Performed by: STUDENT IN AN ORGANIZED HEALTH CARE EDUCATION/TRAINING PROGRAM

## 2023-03-20 RX ORDER — POTASSIUM CHLORIDE 20 MEQ/1
40 TABLET, EXTENDED RELEASE ORAL PRN
Status: DISCONTINUED | OUTPATIENT
Start: 2023-03-20 | End: 2023-03-21 | Stop reason: HOSPADM

## 2023-03-20 RX ORDER — HEPARIN SODIUM 1000 [USP'U]/ML
4000 INJECTION, SOLUTION INTRAVENOUS; SUBCUTANEOUS ONCE
Status: COMPLETED | OUTPATIENT
Start: 2023-03-20 | End: 2023-03-20

## 2023-03-20 RX ORDER — INSULIN LISPRO 100 [IU]/ML
0-4 INJECTION, SOLUTION INTRAVENOUS; SUBCUTANEOUS NIGHTLY
Status: DISCONTINUED | OUTPATIENT
Start: 2023-03-20 | End: 2023-03-21 | Stop reason: HOSPADM

## 2023-03-20 RX ORDER — DEXTROSE MONOHYDRATE 100 MG/ML
INJECTION, SOLUTION INTRAVENOUS CONTINUOUS PRN
Status: DISCONTINUED | OUTPATIENT
Start: 2023-03-20 | End: 2023-03-21 | Stop reason: HOSPADM

## 2023-03-20 RX ORDER — MAGNESIUM SULFATE IN WATER 40 MG/ML
2000 INJECTION, SOLUTION INTRAVENOUS PRN
Status: DISCONTINUED | OUTPATIENT
Start: 2023-03-20 | End: 2023-03-21 | Stop reason: HOSPADM

## 2023-03-20 RX ORDER — NITROGLYCERIN 20 MG/100ML
5-200 INJECTION INTRAVENOUS CONTINUOUS
Status: DISCONTINUED | OUTPATIENT
Start: 2023-03-20 | End: 2023-03-20 | Stop reason: DRUGHIGH

## 2023-03-20 RX ORDER — M-VIT,TX,IRON,MINS/CALC/FOLIC 27MG-0.4MG
1 TABLET ORAL DAILY
COMMUNITY

## 2023-03-20 RX ORDER — ASPIRIN 81 MG/1
324 TABLET, CHEWABLE ORAL ONCE
Status: DISCONTINUED | OUTPATIENT
Start: 2023-03-20 | End: 2023-03-20

## 2023-03-20 RX ORDER — ENOXAPARIN SODIUM 100 MG/ML
30 INJECTION SUBCUTANEOUS 2 TIMES DAILY
Status: DISCONTINUED | OUTPATIENT
Start: 2023-03-20 | End: 2023-03-21 | Stop reason: HOSPADM

## 2023-03-20 RX ORDER — HEPARIN SODIUM 1000 [USP'U]/ML
2000 INJECTION, SOLUTION INTRAVENOUS; SUBCUTANEOUS PRN
Status: DISCONTINUED | OUTPATIENT
Start: 2023-03-20 | End: 2023-03-21 | Stop reason: HOSPADM

## 2023-03-20 RX ORDER — POLYETHYLENE GLYCOL 3350 17 G/17G
17 POWDER, FOR SOLUTION ORAL 2 TIMES DAILY PRN
Status: DISCONTINUED | OUTPATIENT
Start: 2023-03-20 | End: 2023-03-21 | Stop reason: HOSPADM

## 2023-03-20 RX ORDER — HEPARIN SODIUM 1000 [USP'U]/ML
4000 INJECTION, SOLUTION INTRAVENOUS; SUBCUTANEOUS PRN
Status: DISCONTINUED | OUTPATIENT
Start: 2023-03-20 | End: 2023-03-21 | Stop reason: HOSPADM

## 2023-03-20 RX ORDER — HEPARIN SODIUM 10000 [USP'U]/100ML
5-30 INJECTION, SOLUTION INTRAVENOUS CONTINUOUS
Status: DISCONTINUED | OUTPATIENT
Start: 2023-03-20 | End: 2023-03-21 | Stop reason: HOSPADM

## 2023-03-20 RX ORDER — ASPIRIN 81 MG/1
81 TABLET, CHEWABLE ORAL DAILY
Status: DISCONTINUED | OUTPATIENT
Start: 2023-03-20 | End: 2023-03-20

## 2023-03-20 RX ORDER — PANTOPRAZOLE SODIUM 40 MG/10ML
40 INJECTION, POWDER, LYOPHILIZED, FOR SOLUTION INTRAVENOUS DAILY
Status: DISCONTINUED | OUTPATIENT
Start: 2023-03-20 | End: 2023-03-21 | Stop reason: HOSPADM

## 2023-03-20 RX ORDER — NITROGLYCERIN 20 MG/100ML
5-200 INJECTION INTRAVENOUS CONTINUOUS
Status: DISCONTINUED | OUTPATIENT
Start: 2023-03-20 | End: 2023-03-21 | Stop reason: HOSPADM

## 2023-03-20 RX ORDER — ASPIRIN 81 MG/1
324 TABLET, CHEWABLE ORAL ONCE
Status: COMPLETED | OUTPATIENT
Start: 2023-03-20 | End: 2023-03-20

## 2023-03-20 RX ORDER — PREGABALIN 75 MG/1
75 CAPSULE ORAL 2 TIMES DAILY
COMMUNITY

## 2023-03-20 RX ORDER — ONDANSETRON 2 MG/ML
4 INJECTION INTRAMUSCULAR; INTRAVENOUS EVERY 6 HOURS PRN
Status: DISCONTINUED | OUTPATIENT
Start: 2023-03-20 | End: 2023-03-21 | Stop reason: HOSPADM

## 2023-03-20 RX ORDER — ATORVASTATIN CALCIUM 40 MG/1
40 TABLET, FILM COATED ORAL NIGHTLY
Status: DISCONTINUED | OUTPATIENT
Start: 2023-03-20 | End: 2023-03-21 | Stop reason: HOSPADM

## 2023-03-20 RX ORDER — INSULIN LISPRO 100 [IU]/ML
0-4 INJECTION, SOLUTION INTRAVENOUS; SUBCUTANEOUS
Status: DISCONTINUED | OUTPATIENT
Start: 2023-03-20 | End: 2023-03-21 | Stop reason: HOSPADM

## 2023-03-20 RX ORDER — POTASSIUM CHLORIDE 7.45 MG/ML
10 INJECTION INTRAVENOUS PRN
Status: DISCONTINUED | OUTPATIENT
Start: 2023-03-20 | End: 2023-03-21 | Stop reason: HOSPADM

## 2023-03-20 RX ORDER — MORPHINE SULFATE 2 MG/ML
2 INJECTION, SOLUTION INTRAMUSCULAR; INTRAVENOUS EVERY 4 HOURS PRN
Status: DISCONTINUED | OUTPATIENT
Start: 2023-03-20 | End: 2023-03-21 | Stop reason: HOSPADM

## 2023-03-20 RX ADMIN — INSULIN LISPRO 2 UNITS: 100 INJECTION, SOLUTION INTRAVENOUS; SUBCUTANEOUS at 16:35

## 2023-03-20 RX ADMIN — HEPARIN SODIUM 9 UNITS/KG/HR: 10000 INJECTION, SOLUTION INTRAVENOUS at 19:55

## 2023-03-20 RX ADMIN — FAMOTIDINE 20 MG: 10 INJECTION, SOLUTION INTRAVENOUS at 10:09

## 2023-03-20 RX ADMIN — NITROGLYCERIN 5 MCG/MIN: 20 INJECTION INTRAVENOUS at 19:08

## 2023-03-20 RX ADMIN — LIDOCAINE HYDROCHLORIDE: 20 SOLUTION ORAL; TOPICAL at 09:27

## 2023-03-20 RX ADMIN — NITROGLYCERIN 5 MCG/MIN: 20 INJECTION INTRAVENOUS at 20:30

## 2023-03-20 RX ADMIN — ASPIRIN 81 MG 324 MG: 81 TABLET ORAL at 20:54

## 2023-03-20 RX ADMIN — HEPARIN SODIUM 4000 UNITS: 1000 INJECTION INTRAVENOUS; SUBCUTANEOUS at 19:34

## 2023-03-20 ASSESSMENT — PAIN DESCRIPTION - PAIN TYPE: TYPE: ACUTE PAIN

## 2023-03-20 ASSESSMENT — PAIN SCALES - GENERAL
PAINLEVEL_OUTOF10: 9
PAINLEVEL_OUTOF10: 7
PAINLEVEL_OUTOF10: 6
PAINLEVEL_OUTOF10: 7
PAINLEVEL_OUTOF10: 6
PAINLEVEL_OUTOF10: 6
PAINLEVEL_OUTOF10: 7
PAINLEVEL_OUTOF10: 6

## 2023-03-20 ASSESSMENT — PAIN - FUNCTIONAL ASSESSMENT
PAIN_FUNCTIONAL_ASSESSMENT: PREVENTS OR INTERFERES SOME ACTIVE ACTIVITIES AND ADLS
PAIN_FUNCTIONAL_ASSESSMENT: 0-10

## 2023-03-20 ASSESSMENT — PAIN DESCRIPTION - ORIENTATION: ORIENTATION: MID

## 2023-03-20 ASSESSMENT — PAIN DESCRIPTION - ONSET: ONSET: ON-GOING

## 2023-03-20 ASSESSMENT — PAIN DESCRIPTION - LOCATION: LOCATION: MEDIASTINUM

## 2023-03-20 ASSESSMENT — PAIN DESCRIPTION - DESCRIPTORS: DESCRIPTORS: CRAMPING;ACHING

## 2023-03-20 NOTE — ED PROVIDER NOTES
[facility] 2 ICU  EMERGENCY DEPARTMENT ENCOUNTER    Pt Name: Greta Mackey  MRN: [id number]  [place] 1955  Date of evaluation: 3/20/2023  Provider: Corina Bosch MD  PCP: Aniceto Eduardo MD  Note Started: 8:39 AM EDT 3/20/23    HPI     Patient is a 78 y.o. male presents with a chief complaint of   Chief Complaint   Patient presents with    Chest Pain     C/o burning sensation in chest that he woke with this am.  Denies any N/V also has some mild SOB   . Patient presents with chest pain. Patient stated that he woke up this morning and had a burning sensation. Patient denies any shortness of breath. Patient does state that he has esophageal ulcer that was previously diagnosed. Patient took aspirin without improvement of symptoms. Denies any fevers, chills, nausea, vomiting, abdominal pain, change in urinary or bowel habits. Nursing Notes were all reviewed and agreed with or any disagreements were addressed in the HPI. History From: Patient    Review of Systems   Positives and Pertinent negatives as per HPI. Physical Exam  Vitals and nursing note reviewed. Constitutional:       Appearance: He is well-developed. HENT:      Head: Normocephalic and atraumatic. Eyes:      Conjunctiva/sclera: Conjunctivae normal.   Cardiovascular:      Rate and Rhythm: Normal rate and regular rhythm. Heart sounds: Normal heart sounds. No murmur heard. Pulmonary:      Effort: Pulmonary effort is normal. No respiratory distress. Breath sounds: Normal breath sounds. No wheezing or rales. Abdominal:      General: Bowel sounds are normal.      Palpations: Abdomen is soft. Tenderness: There is abdominal tenderness. There is no guarding or rebound. Comments: Tenderness to the epigastric region. Musculoskeletal:         General: No tenderness or deformity. Cervical back: Normal range of motion and neck supple. Skin:     General: Skin is warm and dry.       Capillary Refill:

## 2023-03-20 NOTE — PROGRESS NOTES
Per [employer] approved formulary policy. SGLT2's are only formulary with the indication of CKD or CHF therefore:    Please note that the  Empagliflozin [place] Christiansonon) is non-formulary with indications of type 2 diabetes and has been discontinued while inpatient. If you feel the patient needs to continue their home therapy during the inpatient stay, the patient may bring their medication bottle for verification and administration pursuant to our home medication use policy. Please contact the pharmacy with any questions or concerns. Thank you.     Katyha aCstillo [id number], [id number] [facility]  3/20/2023 3:28 PM

## 2023-03-20 NOTE — H&P
Denies epistaxis, headaches, vertigo or visual changes    Cardiovascular:   Resolving chest pain as described above. Respiratory:   Denies shortness of breath, coughing, sputum production, hemoptysis, or wheezing. No orthopnea. Gastrointestinal:   Denies nausea, vomiting, diarrhea, or constipation. Denies any abdominal pain. Denies change in bowel habits or stools. Genito-Urinary:    Denies any urgency, frequency, hematuria. Voiding without difficulty. Musculoskeletal:   Denies joint pain, joint stiffness, joint swelling or muscle pain    Neurology:    Denies any headache or focal neurological deficits. No weakness or paresthesia. Derm:    Denies any rashes, ulcers, or excoriations. Denies bruising. Extremities:   Denies any lower extremity swelling or edema. PHYSICAL EXAM:  VITALS:  Vitals:    03/20/23 1234   BP:    Pulse: 60   Resp: 12   Temp:    SpO2: 97%         CONSTITUTIONAL:    Awake, alert, cooperative, no apparent distress, and appears stated age    EYES:    PERRL, EOMI, sclera clear, conjunctiva normal    ENT:    Normocephalic, atraumatic, sinuses nontender on palpation. External ears without lesions. Oral pharynx with moist mucus membranes. Tonsils without erythema or exudates. NECK:    Supple, symmetrical, trachea midline, no adenopathy, thyroid symmetric, not enlarged and no tenderness, skin normal, no bruits, no JVD    HEMATOLOGIC/LYMPHATICS:    No cervical lymphadenopathy and no supraclavicular lymphadenopathy    LUNGS:    Symmetric.  No increased work of breathing, good air exchange, clear to auscultation bilaterally, no wheezes, rhonchi, or rales,     CARDIOVASCULAR:    Normal apical impulse, regular rate and rhythm, normal S1 and S2, no S3 or S4, and no murmur noted    ABDOMEN:    No scars, normal bowel sounds, soft, non-distended, non-tender, no masses palpated, no hepatosplenomegaly, no rebound or guarding elicited on palpation     MUSCULOSKELETAL:    There is

## 2023-03-20 NOTE — SIGNIFICANT EVENT
Significant Event Note    Primary Care Physician: Bettie Irwin MD   Admitting Physician:  Anika Mckeon DO  Admission date and time: 3/20/2023  8:37 AM    Room:  [address] [place] [address]  Admitting diagnosis: Chest pain [R07.9]    Patient Name: Yolanda Orellana  MRN: [id number]    Date of Service: 3/20/2023     Celaina Ponce of significant event:  Troponin elevation    Information provided by nursing:    Details:  Yolanda Orellana Was admitted with mildly elevated troponin for chest pain with at least moderate risk for cardiac etiology. His troponin has increased substantially. I spoke with the charge RN. Cardiology has been updated and plan is in place for pain control and close follow up of his symptoms by on-call cardiology. LABORATORY DATA:   Latest Reference Range & Units 3/20/23 09:14 3/20/23 10:31 3/20/23 15:48 3/20/23 17:33   Troponin, High Sensitivity 0 - 11 ng/L 11 16 (H) 859 (H) 1,086 (H)   (H): Data is abnormally high    PLAN:  Yolanda Orellana was admitted with mildly elevated troponin for chest pain with at least moderate risk for cardiac etiology. His troponin has increased substantially. I spoke with the charge RN. She has reached out to [facility] who has put patient on Nitro and Heparin gtt's. He will be re-evaluated via symptom control in 30 minutes as per the charge RN and plan of care will be determined moving forward at that time by the cardiologist. If he is to remain on Nitro gtt, he will need to transfer to [facility]. I have strong concern that he will need soon or possibly even urgent heart cath and I anticipate this to occur tomorrow unless needed sooner. Tae Kirby Appropriate plan is in place with plan for close re-evaluation. I have discussed the case with the pertinent nursing staff and consultants have been notified by the RN. Based upon present situation, the patient does require transfer to higher level of care. Additional consultation is potential based upon response to treatment.  I have asked

## 2023-03-20 NOTE — PROGRESS NOTES
Pharmacist Review and Automatic Dose Adjustment of Prophylactic Enoxaparin         The reviewing pharmacist has made an adjustment to the ordered enoxaparin dose or converted to UFH per the approved [facility] protocol and table as identified below. Jenny Garvin is a 78 y.o. male. Recent Labs     03/20/23  0914   CREATININE 1.1       Estimated Creatinine Clearance: 82 mL/min (based on SCr of 1.1 mg/dL). Recent Labs     03/20/23  0914   HGB 15.7   HCT 47.6        No results for input(s): INR in the last 72 hours.     Height:   Ht Readings from Last 1 Encounters:   03/20/23 6' (1.829 m)     Weight:  Wt Readings from Last 1 Encounters:   03/20/23 225 lb (102.1 kg)               Plan: Based upon the patient's weight and renal function    Ordered: Enoxaparin 40mg SUBQ Daily    Changed/converted to    New Order: Enoxaparin 30mg SUBQ BID      Thank you,  Aida Taylor, [facility]  3/20/2023, 4:19 PM

## 2023-03-20 NOTE — PROGRESS NOTES
Pharmacy Medication Reconciliation     The patient was interviewed regarding current PTA medication list, use and drug allergies. The patient was questioned regarding use of any other inhalers, topical products, over the counter medications, herbal medications, vitamin products or ophthalmic/nasal/otic medication use. Allergy Update: Celebrex [celecoxib], Lipitor [atorvastatin], and Nexium [esomeprazole magnesium trihydrate]    Recommendations/Findings/Discrepancies:    The following amendments were made to the patient's active medication list on file:   1) Additions: pregabalin, centrum    2) Deletions: prenatal vitamin, glipizide, linzess, melatonin,     3) Changes: n/a    Source/s of information: Patient and [employer]     Thank you,  Marie Jolley [facility], PharmD.,3/20/2023 4:59 PM

## 2023-03-21 LAB
EKG ATRIAL RATE: 55 BPM
EKG ATRIAL RATE: 58 BPM
EKG ATRIAL RATE: 61 BPM
EKG P AXIS: 20 DEGREES
EKG P AXIS: 42 DEGREES
EKG P AXIS: 44 DEGREES
EKG P-R INTERVAL: 142 MS
EKG P-R INTERVAL: 150 MS
EKG P-R INTERVAL: 152 MS
EKG Q-T INTERVAL: 400 MS
EKG Q-T INTERVAL: 410 MS
EKG Q-T INTERVAL: 414 MS
EKG QRS DURATION: 82 MS
EKG QRS DURATION: 82 MS
EKG QRS DURATION: 86 MS
EKG QTC CALCULATION (BAZETT): 396 MS
EKG QTC CALCULATION (BAZETT): 402 MS
EKG QTC CALCULATION (BAZETT): 402 MS
EKG R AXIS: -14 DEGREES
EKG R AXIS: -14 DEGREES
EKG R AXIS: 21 DEGREES
EKG T AXIS: 14 DEGREES
EKG T AXIS: 4 DEGREES
EKG T AXIS: 46 DEGREES
EKG VENTRICULAR RATE: 55 BPM
EKG VENTRICULAR RATE: 58 BPM
EKG VENTRICULAR RATE: 61 BPM

## 2023-03-21 PROCEDURE — 93010 ELECTROCARDIOGRAM REPORT: CPT | Performed by: INTERNAL MEDICINE

## 2023-03-21 NOTE — DISCHARGE SUMMARY
Internal Medicine Progress Note     JANICE=[facility]     Sarah Sigala. Bina Rivera., AJAY Hirsch D.O., CARLOS MANUEL Epstein, MSN, APRN, NP-C  Lynda Moser. Graeme Tello, MSN, APRN-CNP       Internal Medicine  Discharge Summary    NAME: Cristiano Gilmore  :  1955  MRN:  [id number]  Deanna Epperson MD  ADMITTED: 3/20/2023      DISCHARGED: 3/20/23    ADMITTING PHYSICIAN: Delon Neumann DO    CONSULTANT(S):   IP CONSULT TO CARDIOLOGY     ADMITTING DIAGNOSIS:   Chest pain [R07.9]     DISCHARGE DIAGNOSES:   Non-STEMI with intractable chest pain and significantly uptrending troponin consistent with evolving acute coronary syndrome   Non-insulin-dependent diabetes mellitus type 2  History of peptic ulcer disease  Depression  Neuropathy    BRIEF HISTORY OF PRESENT ILLNESS:   The patient presented to [facility] with chest pain and risk factors including age, BMI, diabetes, hyperlipidemia. His family history status was unknown as his parents both  when he was in adolescence and he has no siblings. His initial ER work-up was benign in regards to cardiac standpoint including nonelevated troponin and nonischemic EKG. Follow-up troponin did increase slightly however was not significantly elevated and the patient was relatively comfortable. He was not given 324 mg aspirin load in the emergency department. As his heart rate was on the lower end of normal he was not given antianginal beta-blocker. As he is allergic to statin he was not given a statin agent. Case was excepted by Dr. Emeli Bass for admission with inpatient cardiology consultation.     LABS[de-identified]  Lab Results   Component Value Date    WBC 10.3 2023    HGB 15.0 2023    HCT 42.3 2023     2023     2023    K 4.1 2023    CL 96 (L) 2023    CREATININE 1.1 2023    BUN 24 (H) 2023    CO2 28 2023    GLUCOSE

## 2023-03-21 NOTE — FLOWSHEET NOTE
Patient has 7 out of 10  chest pain midsternal chest pain radiating to both arms.  Describve as pressure

## 2023-03-21 NOTE — PROGRESS NOTES
Dr. Richard Rodriguez notified of patient's progressive chest pain, elevated troponin, stat EKG results. See order for heparin, nitro gtt. Per Dr. Richard Rodriguez no initial transfer of patient. Transfuse nitro gtt for 30 minutes, then update on patient condition. Patient continued to experience worsening chest pain, radiating to both arms, SOB. Dr. Richard Rodriguez notified, repeat EKG, transfer to ICU for titration of nitro gtt and initiate transfer for cardiac catheterization. Per Dr. Richard Rodriguez, unable to transfer patient to [facility] as they have alternative case simultaneously in route. Per Richard Rodriguez, patient must be transferred to alternative cath lab and deferred peer to peer transfer review to Dr. Rockwell  group. Wayne MCKOY notified. Patient accepted to ICU bed 6. Nurse to nurse called by Víctor Noland RN. Wayne Grier in contact with [facility] access line to initiate patient transfer, to update intensivist LEELEE Alonzo with further information.

## 2023-03-21 NOTE — FLOWSHEET NOTE
[facility] here report given to them report called to ICU at [facility] and report given to Cath Lab. Patient taken via [facility] with belongings. He stated that he will call family.

## 2023-03-21 NOTE — CONSULTS
(porcine) injection 2,000 Units, 2,000 Units, IntraVENous, PRN, Amanda Benoit MD    heparin 25,000 units in dextrose 5% 250 mL (premix) infusion, 5-30 Units/kg/hr, IntraVENous, Continuous, Amanda Benoit MD, Last Rate: 9.2 mL/hr at 03/20/23 1955, 9 Units/kg/hr at 03/20/23 1955    nitroGLYCERIN 200 mcg/ml in dextrose 5%, 5-200 mcg/min, IntraVENous, Continuous, Amanda Benoit MD    Review of Systems:   General: denies weight gain, denies loss of appetite, fever, chills, night sweats. HEENT: denies headaches, dizziness, head trauma, visual changes, eye pain, tinnitus, nosebleeds, hoarseness or throat pain    Respiratory: denies  dyspnea, cough and hemoptysis reports chest pain  Cardiovascular: denies orthopnea, paroxysmal nocturnal dyspnea, leg swelling, and previous heart attack. Gastrointestinal: denies pain, nausea vomiting, diarrhea, constipation, melena or bleeding. Genitourinary: denies hematuria, frequency, urgency or dysuria  Neurology: denies syncope, seizures, paralysis, paraesthesia   Endocrine: denies polyuria, polydipsia, skin or hair changes, and heat or cold intolerance  Musculoskeletal: denies joint pain, swelling, arthritis or myalgia  Hematologic: denies bleeding, adenopathy and easy bruising  Skin: denies rashes and skin discoloration  Psychiatry: denies depression    Physical Exam:   Vital Signs:  /81   Pulse 59   Temp 97.8 °F (36.6 °C) (Oral)   Resp 11   Ht 6' (1.829 m)   Wt 225 lb (102.1 kg)   SpO2 97%   BMI 30.52 kg/m²     Input/Output:  No intake/output data recorded.     Oxygen requirements: 2 L nasal cannula         General appearance: Well developed, , in no respiratory distress    HEENT: Atraumatic/normocephalic, EOMI, LINDA, pharynx clear, moist mucosa  Neck: Supple, no jugular venous distension, lymphadenopathy, thyromegaly or carotid bruits  Chest: Equal normal breath sounds, no wheezing, no crackles and no tenderness over ribs   Cardiovascular: Normal S1 , S2,

## 2023-03-30 ENCOUNTER — OFFICE VISIT (OUTPATIENT)
Dept: FAMILY MEDICINE CLINIC | Age: 68
End: 2023-03-30
Payer: MEDICARE

## 2023-03-30 VITALS
WEIGHT: 223 LBS | SYSTOLIC BLOOD PRESSURE: 124 MMHG | OXYGEN SATURATION: 97 % | BODY MASS INDEX: 30.2 KG/M2 | RESPIRATION RATE: 20 BRPM | HEIGHT: 72 IN | DIASTOLIC BLOOD PRESSURE: 80 MMHG | HEART RATE: 66 BPM

## 2023-03-30 DIAGNOSIS — E11.649 UNCONTROLLED TYPE 2 DIABETES MELLITUS WITH HYPOGLYCEMIA WITHOUT COMA (HCC): ICD-10-CM

## 2023-03-30 DIAGNOSIS — I25.2 STATUS POST NON-ST ELEVATION MYOCARDIAL INFARCTION (NSTEMI): Primary | ICD-10-CM

## 2023-03-30 PROCEDURE — 1111F DSCHRG MED/CURRENT MED MERGE: CPT | Performed by: FAMILY MEDICINE

## 2023-03-30 PROCEDURE — 3017F COLORECTAL CA SCREEN DOC REV: CPT | Performed by: FAMILY MEDICINE

## 2023-03-30 PROCEDURE — 2022F DILAT RTA XM EVC RTNOPTHY: CPT | Performed by: FAMILY MEDICINE

## 2023-03-30 PROCEDURE — G8417 CALC BMI ABV UP PARAM F/U: HCPCS | Performed by: FAMILY MEDICINE

## 2023-03-30 PROCEDURE — 1036F TOBACCO NON-USER: CPT | Performed by: FAMILY MEDICINE

## 2023-03-30 PROCEDURE — 1124F ACP DISCUSS-NO DSCNMKR DOCD: CPT | Performed by: FAMILY MEDICINE

## 2023-03-30 PROCEDURE — G8427 DOCREV CUR MEDS BY ELIG CLIN: HCPCS | Performed by: FAMILY MEDICINE

## 2023-03-30 PROCEDURE — 3046F HEMOGLOBIN A1C LEVEL >9.0%: CPT | Performed by: FAMILY MEDICINE

## 2023-03-30 PROCEDURE — 99214 OFFICE O/P EST MOD 30 MIN: CPT | Performed by: FAMILY MEDICINE

## 2023-03-30 PROCEDURE — G8482 FLU IMMUNIZE ORDER/ADMIN: HCPCS | Performed by: FAMILY MEDICINE

## 2023-03-30 RX ORDER — TICAGRELOR 90 MG/1
TABLET ORAL
COMMUNITY
Start: 2023-03-22 | End: 2023-03-30 | Stop reason: SDUPTHER

## 2023-03-30 RX ORDER — TICAGRELOR 90 MG/1
TABLET ORAL
Qty: 60 TABLET | Refills: 3 | Status: SHIPPED | OUTPATIENT
Start: 2023-03-30

## 2023-03-30 RX ORDER — ATORVASTATIN CALCIUM 80 MG/1
80 TABLET, FILM COATED ORAL NIGHTLY
COMMUNITY
Start: 2023-03-22 | End: 2023-03-30 | Stop reason: SDUPTHER

## 2023-03-30 RX ORDER — GLIPIZIDE 10 MG/1
10 TABLET ORAL
COMMUNITY
End: 2023-04-02

## 2023-03-30 RX ORDER — ATORVASTATIN CALCIUM 80 MG/1
80 TABLET, FILM COATED ORAL NIGHTLY
Qty: 30 TABLET | Refills: 3 | Status: SHIPPED | OUTPATIENT
Start: 2023-03-30

## 2023-03-30 RX ORDER — ASPIRIN 81 MG/1
81 TABLET, COATED ORAL DAILY
COMMUNITY
Start: 2023-03-22 | End: 2023-03-30 | Stop reason: SDUPTHER

## 2023-03-30 RX ORDER — ASPIRIN 81 MG/1
81 TABLET ORAL DAILY
Qty: 30 TABLET | Refills: 3 | Status: SHIPPED | OUTPATIENT
Start: 2023-03-30

## 2023-03-30 ASSESSMENT — ENCOUNTER SYMPTOMS
DIARRHEA: 0
SHORTNESS OF BREATH: 0
VOMITING: 0
NAUSEA: 1

## 2023-03-30 NOTE — PROGRESS NOTES
[address] [facility], [address]   [address] [facility]   Shira Black MD     Patient: Milagro Starr Birth: 1955  Visit Date: 3/30/23    Katharine Ruiz is a 78y.o. year old male here today for   Chief Complaint   Patient presents with    Follow-Up from [facility], had heart surgery  ,dr White Perfect    Chest Pain       HPI  Patient was recently discharged home from [facility] after being admitted to [address] [place] [address],[address] then transferred to [facility] due to chest pain. Patient was diagnosed with NSTEMI. He had woken up at home in bed with chest heaviness. Had no initial relief with aspirin. Patient had associated shortness of breath. Patient required heart catheterization--not sure if stent was placed. Patient admits he has been under a lot of stress since his wife left him last year. Recent lab results reviewed, including CMP, CBC and troponin which are remarkable for hyperglycemia and elevated troponin. [facility] heart catheterization records not available. Review of Systems   Eyes:  Negative for visual disturbance. Respiratory:  Negative for shortness of breath. Cardiovascular:  Negative for chest pain, palpitations and leg swelling. Gastrointestinal:  Positive for nausea (initially). Negative for diarrhea and vomiting. Genitourinary:  Negative for difficulty urinating, dysuria and frequency. Skin:  Positive for rash (acne on chest and neck). Neurological:  Positive for numbness (both arms comes and goes). Psychiatric/Behavioral:  Negative for dysphoric mood. Past medical, surgical, social and/or family historyreviewed, updated as needed, and are non-contributory (unless otherwise stated). Medications, allergies, and problem list also reviewed and updated as needed in patient's record.      Current Outpatient Medications   Medication Sig Dispense Refill    glipiZIDE (GLUCOTROL) 10 MG tablet Take 1 tablet by mouth 2 times daily (before meals) 60

## 2023-04-02 PROBLEM — I25.2 STATUS POST NON-ST ELEVATION MYOCARDIAL INFARCTION (NSTEMI): Status: ACTIVE | Noted: 2023-04-02

## 2023-04-02 RX ORDER — GLIPIZIDE 10 MG/1
10 TABLET ORAL
Qty: 60 TABLET | Refills: 0
Start: 2023-04-02

## 2023-04-02 RX ORDER — SITAGLIPTIN AND METFORMIN HYDROCHLORIDE 1000; 100 MG/1; MG/1
1 TABLET, FILM COATED, EXTENDED RELEASE ORAL DAILY
Qty: 30 TABLET | Refills: 3
Start: 2023-04-02

## 2023-04-04 ENCOUNTER — HOSPITAL ENCOUNTER (OUTPATIENT)
Age: 68
Discharge: HOME | End: 2023-04-04
Payer: MEDICARE

## 2023-04-04 DIAGNOSIS — E11.65 TYPE 2 DIABETES MELLITUS WITH HYPERGLYCEMIA, WITHOUT LONG-TERM CURRENT USE OF INSULIN (HCC): ICD-10-CM

## 2023-04-04 DIAGNOSIS — R97.20 ELEVATED PSA: ICD-10-CM

## 2023-04-04 DIAGNOSIS — Z12.5 ENCOUNTER FOR SCREENING FOR MALIGNANT NEOPLASM OF PROSTATE: ICD-10-CM

## 2023-04-04 LAB
ALBUMIN SERPL-MCNC: 4.2 G/DL (ref 3.5–5.2)
ALP SERPL-CCNC: 104 U/L (ref 40–129)
ALT SERPL-CCNC: 117 U/L (ref 0–40)
ANION GAP SERPL CALCULATED.3IONS-SCNC: 10 MMOL/L (ref 7–16)
AST SERPL-CCNC: 123 U/L (ref 0–39)
BILIRUB SERPL-MCNC: 0.7 MG/DL (ref 0–1.2)
BUN SERPL-MCNC: 20 MG/DL (ref 6–23)
CALCIUM SERPL-MCNC: 9.5 MG/DL (ref 8.6–10.2)
CHLORIDE SERPL-SCNC: 98 MMOL/L (ref 98–107)
CHOLESTEROL, TOTAL: 163 MG/DL (ref 0–199)
CO2 SERPL-SCNC: 28 MMOL/L (ref 22–29)
CREAT SERPL-MCNC: 1 MG/DL (ref 0.7–1.2)
ERYTHROCYTE [DISTWIDTH] IN BLOOD BY AUTOMATED COUNT: 11.5 FL (ref 11.5–15)
GLUCOSE SERPL-MCNC: 268 MG/DL (ref 74–99)
HCT VFR BLD AUTO: 45.3 % (ref 37–54)
HDLC SERPL-MCNC: 37 MG/DL
HGB BLD-MCNC: 15.3 G/DL (ref 12.5–16.5)
LDLC SERPL CALC-MCNC: 96 MG/DL (ref 0–99)
MCH RBC QN AUTO: 31.7 PG (ref 26–35)
MCHC RBC AUTO-ENTMCNC: 33.8 % (ref 32–34.5)
MCV RBC AUTO: 93.8 FL (ref 80–99.9)
PLATELET # BLD AUTO: 250 E9/L (ref 130–450)
PMV BLD AUTO: 10.8 FL (ref 7–12)
POTASSIUM SERPL-SCNC: 4.7 MMOL/L (ref 3.5–5)
PROT SERPL-MCNC: 7.2 G/DL (ref 6.4–8.3)
PSA SERPL-MCNC: 4.93 NG/ML (ref 0–4)
RBC # BLD AUTO: 4.83 E12/L (ref 3.8–5.8)
SODIUM SERPL-SCNC: 136 MMOL/L (ref 132–146)
TRIGL SERPL-MCNC: 148 MG/DL (ref 0–149)
TSH SERPL-MCNC: 1.17 UIU/ML (ref 0.27–4.2)
VLDLC SERPL CALC-MCNC: 30 MG/DL
WBC # BLD: 6.2 E9/L (ref 4.5–11.5)

## 2023-04-04 PROCEDURE — 84443 ASSAY THYROID STIM HORMONE: CPT

## 2023-04-04 PROCEDURE — 36415 COLL VENOUS BLD VENIPUNCTURE: CPT

## 2023-04-04 PROCEDURE — 80053 COMPREHEN METABOLIC PANEL: CPT

## 2023-04-04 PROCEDURE — 80061 LIPID PANEL: CPT

## 2023-04-04 PROCEDURE — 85027 COMPLETE CBC AUTOMATED: CPT

## 2023-04-04 PROCEDURE — G0103 PSA SCREENING: HCPCS

## 2023-04-26 ENCOUNTER — OFFICE VISIT (OUTPATIENT)
Dept: FAMILY MEDICINE CLINIC | Age: 68
End: 2023-04-26

## 2023-04-26 VITALS
WEIGHT: 217 LBS | DIASTOLIC BLOOD PRESSURE: 70 MMHG | SYSTOLIC BLOOD PRESSURE: 132 MMHG | OXYGEN SATURATION: 97 % | HEIGHT: 72 IN | HEART RATE: 44 BPM | RESPIRATION RATE: 20 BRPM | BODY MASS INDEX: 29.39 KG/M2

## 2023-04-26 DIAGNOSIS — E11.65 TYPE 2 DIABETES MELLITUS WITH HYPERGLYCEMIA, WITHOUT LONG-TERM CURRENT USE OF INSULIN (HCC): Primary | ICD-10-CM

## 2023-04-26 LAB — HBA1C MFR BLD: 9 %

## 2023-04-26 ASSESSMENT — ENCOUNTER SYMPTOMS
DIARRHEA: 0
SHORTNESS OF BREATH: 0
ABDOMINAL PAIN: 1
VOMITING: 0
NAUSEA: 0

## 2023-04-26 NOTE — PROGRESS NOTES
OFFICE PROGRESS NOTE      SUBJECTIVE:        Patient ID:   Eliz Madera is a 78 y.o. male whopresents for   Chief Complaint   Patient presents with    Diabetes     lab results       HPI:   Patient is here to follow up on diabetes. Fasting blood sugars:110-120's Midday blood sugars: not checking. Patient checks blood glucose 1 times per day. Patient is following diabetic diet. Patient is a nonsmoker. Last ophthalmology visit: 1/2023. Patient is taking a daily statin. Recent lab results reviewed including CMP, CBC, TSH, and lipid panel which are remarkable for elevated liver enzymes and hyperglycemia. Patient was recently seen in [facility] due to left upper abdominal pain. Was diagnosed with splenic infarct. Was sent home from ED and told to follow up with hematology. Prior to Admission medications    Medication Sig Start Date End Date Taking? Authorizing Provider   glipiZIDE (GLUCOTROL) 10 MG tablet Take 1 tablet by mouth 2 times daily (before meals) 4/29/23  Yes Kaci Parker MD   SITagliptin-metFORMIN HCl ER (JANUMET XR) 100-1000 MG TB24 Take 1 tablet by mouth daily 4/29/23  Yes Kaci Parker MD   empagliflozin (JARDIANCE) 10 MG tablet Take 1 tablet by mouth daily 4/29/23  Yes Kaci Parker MD   BRILINTA 90 MG TABS tablet TAKE ONE TABLET BY MOUTH EVERY 12 HOURS 3/30/23  Yes Kaci Parker MD   metoprolol tartrate (LOPRESSOR) 25 MG tablet TAKE ONE TABLET BY MOUTH EVERY 12 HOURS 3/30/23  Yes Kaci Parker MD   atorvastatin (LIPITOR) 80 MG tablet Take 1 tablet by mouth nightly 3/30/23  Yes Kaci Parker MD   aspirin (ASPIRIN LOW DOSE) 81 MG EC tablet Take 1 tablet by mouth daily 3/30/23  Yes Kaci Parker MD   pregabalin (LYRICA) 75 MG capsule Take 1 capsule by mouth 2 times daily.  Has not started as of 3/20/23   Yes Historical Provider, MD   Multiple Vitamins-Minerals (THERAPEUTIC MULTIVITAMIN-MINERALS) tablet Take 1

## 2023-04-29 RX ORDER — GLIPIZIDE 10 MG/1
10 TABLET ORAL
Qty: 60 TABLET | Refills: 0
Start: 2023-04-29

## 2023-04-29 RX ORDER — SITAGLIPTIN AND METFORMIN HYDROCHLORIDE 1000; 100 MG/1; MG/1
1 TABLET, FILM COATED, EXTENDED RELEASE ORAL DAILY
Qty: 30 TABLET | Refills: 3
Start: 2023-04-29

## 2023-07-03 ENCOUNTER — TELEPHONE (OUTPATIENT)
Dept: FAMILY MEDICINE CLINIC | Age: 68
End: 2023-07-03

## 2023-07-03 NOTE — TELEPHONE ENCOUNTER
[facility] RN Srinivasa Arriaga called to report as per Dr. Sonya Lackey instructions that patients heart rate is 50. Srinivasa Arriaga RN states patient is asymptomatic. Home care instructed to inform PCP if it is below 60.  Please advise  cardiopulmonary resuscitation

## 2023-08-04 ENCOUNTER — TELEPHONE (OUTPATIENT)
Dept: FAMILY MEDICINE CLINIC | Age: 68
End: 2023-08-04

## 2023-08-04 NOTE — TELEPHONE ENCOUNTER
There is no such thing as that type of transplant. Please inform patient that we can discuss referrals at his upcoming appointment. He just missed one yesterday.

## 2023-08-04 NOTE — TELEPHONE ENCOUNTER
Patient called asking for a referral for [facility] for gastro. States he needs a fecal transplant.    Last seen 6/13/2023  Next appt 8/7/2023

## 2023-08-07 ENCOUNTER — OFFICE VISIT (OUTPATIENT)
Dept: FAMILY MEDICINE CLINIC | Age: 68
End: 2023-08-07
Payer: MEDICARE

## 2023-08-07 VITALS
WEIGHT: 202 LBS | OXYGEN SATURATION: 97 % | HEIGHT: 72 IN | DIASTOLIC BLOOD PRESSURE: 78 MMHG | BODY MASS INDEX: 27.36 KG/M2 | RESPIRATION RATE: 20 BRPM | HEART RATE: 72 BPM | SYSTOLIC BLOOD PRESSURE: 124 MMHG

## 2023-08-07 DIAGNOSIS — E11.65 TYPE 2 DIABETES MELLITUS WITH HYPERGLYCEMIA, WITHOUT LONG-TERM CURRENT USE OF INSULIN (HCC): Primary | ICD-10-CM

## 2023-08-07 LAB — HBA1C MFR BLD: 5.7 %

## 2023-08-07 PROCEDURE — 2022F DILAT RTA XM EVC RTNOPTHY: CPT | Performed by: FAMILY MEDICINE

## 2023-08-07 PROCEDURE — G8417 CALC BMI ABV UP PARAM F/U: HCPCS | Performed by: FAMILY MEDICINE

## 2023-08-07 PROCEDURE — 99214 OFFICE O/P EST MOD 30 MIN: CPT | Performed by: FAMILY MEDICINE

## 2023-08-07 PROCEDURE — 3017F COLORECTAL CA SCREEN DOC REV: CPT | Performed by: FAMILY MEDICINE

## 2023-08-07 PROCEDURE — G8427 DOCREV CUR MEDS BY ELIG CLIN: HCPCS | Performed by: FAMILY MEDICINE

## 2023-08-07 PROCEDURE — 3044F HG A1C LEVEL LT 7.0%: CPT | Performed by: FAMILY MEDICINE

## 2023-08-07 PROCEDURE — 83037 HB GLYCOSYLATED A1C HOME DEV: CPT | Performed by: FAMILY MEDICINE

## 2023-08-07 PROCEDURE — 1036F TOBACCO NON-USER: CPT | Performed by: FAMILY MEDICINE

## 2023-08-07 PROCEDURE — 1124F ACP DISCUSS-NO DSCNMKR DOCD: CPT | Performed by: FAMILY MEDICINE

## 2023-08-07 RX ORDER — VANCOMYCIN HYDROCHLORIDE 250 MG/1
CAPSULE ORAL
COMMUNITY
Start: 2023-08-03

## 2023-08-07 RX ORDER — FAMOTIDINE 20 MG/1
TABLET, FILM COATED ORAL
COMMUNITY
Start: 2023-07-19

## 2023-08-07 RX ORDER — SITAGLIPTIN AND METFORMIN HYDROCHLORIDE 1000; 100 MG/1; MG/1
1 TABLET, FILM COATED, EXTENDED RELEASE ORAL DAILY
Qty: 30 TABLET | Refills: 3
Start: 2023-08-07

## 2023-08-07 RX ORDER — GLIPIZIDE 10 MG/1
10 TABLET ORAL
Qty: 60 TABLET | Refills: 0
Start: 2023-08-07

## 2023-08-07 ASSESSMENT — ENCOUNTER SYMPTOMS
DIARRHEA: 0
VOMITING: 0
SHORTNESS OF BREATH: 0
NAUSEA: 0

## 2023-08-07 NOTE — PROGRESS NOTES
tartrate (LOPRESSOR) 25 MG tablet TAKE ONE TABLET BY MOUTH EVERY 12 HOURS (Patient taking differently: 2 tablets TAKE ONE TABLET BY MOUTH EVERY 12 HOURS) 60 tablet 3    pregabalin (LYRICA) 75 MG capsule Take 1 capsule by mouth 2 times daily. Has not started as of 3/20/23      Multiple Vitamins-Minerals (THERAPEUTIC MULTIVITAMIN-MINERALS) tablet Take 1 tablet by mouth daily      pantoprazole (PROTONIX) 40 MG tablet TAKE ONE TABLET BY MOUTH EVERY MORNING 30 tablet 5    Tenapanor HCl (IBSRELA) 50 MG TABS Take 50 mg by mouth      blood glucose test strips (ASCENSIA AUTODISC VI;ONE TOUCH ULTRA TEST VI) strip Check blood sugar  each 12    ONE TOUCH ULTRASOFT LANCETS MISC Check blood sugar  each 12    polyethylene glycol (MIRALAX) 17 g packet Take 17 g by mouth 2 times daily as needed for Constipation 527 g 0    triamcinolone (KENALOG) 0.1 % cream Apply topically 2 times daily for up to 2 weeks then as needed for flareups. 80 g 1    mupirocin (BACTROBAN) 2 % ointment Apply topically 3 times daily. (Patient taking differently: 3 times daily as needed Apply topically 3 times daily. Only when has a wound) 22 g 5     No current facility-administered medications for this visit. Review Of Systems:    Review of Systems   Eyes:  Negative for visual disturbance. Respiratory:  Negative for shortness of breath. Cardiovascular:  Negative for chest pain, palpitations and leg swelling. Gastrointestinal:  Negative for diarrhea, nausea and vomiting. Genitourinary:  Negative for difficulty urinating, dysuria and frequency. Skin:  Negative for rash. Psychiatric/Behavioral:  Negative for dysphoric mood. OBJECTIVE:     VS:  Wt Readings from Last 3 Encounters:   08/07/23 202 lb (91.6 kg)   06/13/23 200 lb (90.7 kg)   04/26/23 217 lb (98.4 kg)     Vitals:    08/07/23 1127   BP: 124/78   Pulse: 72   Resp: 20   SpO2: 97%       Physical Exam  Vitals reviewed.    Constitutional:       General: He is not

## 2023-08-15 DIAGNOSIS — E11.65 TYPE 2 DIABETES MELLITUS WITH HYPERGLYCEMIA, WITHOUT LONG-TERM CURRENT USE OF INSULIN (HCC): ICD-10-CM

## 2023-08-15 NOTE — TELEPHONE ENCOUNTER
Patient called for refills. I noted both RX were put in on 8/7/23, but not sent electronically to the pharmacy.     Last seen 8/7/2023  Next appt 11/9/2023  [facility]

## 2023-08-21 RX ORDER — GLIPIZIDE 10 MG/1
10 TABLET ORAL
Qty: 60 TABLET | Refills: 0 | Status: SHIPPED | OUTPATIENT
Start: 2023-08-21

## 2023-08-21 RX ORDER — SITAGLIPTIN AND METFORMIN HYDROCHLORIDE 1000; 100 MG/1; MG/1
1 TABLET, FILM COATED, EXTENDED RELEASE ORAL DAILY
Qty: 30 TABLET | Refills: 3 | Status: SHIPPED | OUTPATIENT
Start: 2023-08-21

## 2023-11-29 ENCOUNTER — OFFICE VISIT (OUTPATIENT)
Dept: FAMILY MEDICINE CLINIC | Age: 68
End: 2023-11-29
Payer: MEDICARE

## 2023-11-29 VITALS
DIASTOLIC BLOOD PRESSURE: 86 MMHG | WEIGHT: 217 LBS | SYSTOLIC BLOOD PRESSURE: 126 MMHG | HEART RATE: 79 BPM | BODY MASS INDEX: 29.39 KG/M2 | TEMPERATURE: 97.5 F | HEIGHT: 72 IN | OXYGEN SATURATION: 96 % | RESPIRATION RATE: 16 BRPM

## 2023-11-29 DIAGNOSIS — Z00.00 MEDICARE ANNUAL WELLNESS VISIT, SUBSEQUENT: Primary | ICD-10-CM

## 2023-11-29 DIAGNOSIS — E11.65 TYPE 2 DIABETES MELLITUS WITH HYPERGLYCEMIA, WITHOUT LONG-TERM CURRENT USE OF INSULIN (HCC): ICD-10-CM

## 2023-11-29 PROBLEM — R07.9 CHEST PAIN: Status: RESOLVED | Noted: 2023-03-20 | Resolved: 2023-11-29

## 2023-11-29 LAB — HBA1C MFR BLD: 9.4 %

## 2023-11-29 PROCEDURE — 3017F COLORECTAL CA SCREEN DOC REV: CPT | Performed by: FAMILY MEDICINE

## 2023-11-29 PROCEDURE — 1124F ACP DISCUSS-NO DSCNMKR DOCD: CPT | Performed by: FAMILY MEDICINE

## 2023-11-29 PROCEDURE — G0439 PPPS, SUBSEQ VISIT: HCPCS | Performed by: FAMILY MEDICINE

## 2023-11-29 PROCEDURE — 83036 HEMOGLOBIN GLYCOSYLATED A1C: CPT | Performed by: FAMILY MEDICINE

## 2023-11-29 PROCEDURE — G8484 FLU IMMUNIZE NO ADMIN: HCPCS | Performed by: FAMILY MEDICINE

## 2023-11-29 PROCEDURE — 3046F HEMOGLOBIN A1C LEVEL >9.0%: CPT | Performed by: FAMILY MEDICINE

## 2023-11-29 RX ORDER — ATORVASTATIN CALCIUM 80 MG/1
80 TABLET, FILM COATED ORAL NIGHTLY
Qty: 30 TABLET | Refills: 5 | Status: SHIPPED | OUTPATIENT
Start: 2023-11-29

## 2023-11-29 SDOH — HEALTH STABILITY: PHYSICAL HEALTH: ON AVERAGE, HOW MANY DAYS PER WEEK DO YOU ENGAGE IN MODERATE TO STRENUOUS EXERCISE (LIKE A BRISK WALK)?: 5 DAYS

## 2023-11-29 ASSESSMENT — LIFESTYLE VARIABLES
HOW MANY STANDARD DRINKS CONTAINING ALCOHOL DO YOU HAVE ON A TYPICAL DAY: 0
HOW OFTEN DO YOU HAVE A DRINK CONTAINING ALCOHOL: 1
HOW MANY STANDARD DRINKS CONTAINING ALCOHOL DO YOU HAVE ON A TYPICAL DAY: PATIENT DOES NOT DRINK
HOW OFTEN DO YOU HAVE A DRINK CONTAINING ALCOHOL: NEVER

## 2023-11-29 ASSESSMENT — PATIENT HEALTH QUESTIONNAIRE - PHQ9
SUM OF ALL RESPONSES TO PHQ QUESTIONS 1-9: 0
SUM OF ALL RESPONSES TO PHQ9 QUESTIONS 1 & 2: 0
2. FEELING DOWN, DEPRESSED OR HOPELESS: 0
SUM OF ALL RESPONSES TO PHQ QUESTIONS 1-9: 0
SUM OF ALL RESPONSES TO PHQ QUESTIONS 1-9: 0
1. LITTLE INTEREST OR PLEASURE IN DOING THINGS: 0
SUM OF ALL RESPONSES TO PHQ QUESTIONS 1-9: 0

## 2023-11-29 NOTE — PATIENT INSTRUCTIONS
mop handy in the kitchen so you can wipe up spills instantly. You should also have a small fire extinguisher. Arrange your kitchen with frequently used items on lower shelves to avoid the need to stand on a stepstool to reach them. Make sure countertops are well-lit to avoid injuries while cutting and preparing food. In the Bathroom    Use a non-slip mat or decals in the tub and shower, since wet, soapy tile or porcelain surfaces are extremely slippery. Make sure bathroom rugs are non-skid or tape them firmly to the floor. Bathtubs should have at least one, preferably two, grab bars, firmly attached to structural supports in the wall. (Do not use built-in soap holders or glass shower doors as grab bars.)    Tub seats fitted with non-slip material on the legs allow you to wash sitting down. For people with limited mobility, bathtub transfer benches allow you to slide safely into the tub. Raised toilet seats and toilet safety rails are helpful for those with knee or hip problems. In the [address] [place] [address]    Make sure you use a nightlight and that the area around your bed is clear of potential obstacles. Be careful with electric blankets and never go to sleep with a heating pad, which can cause serious burns even if on a low setting. Use fire-resistant mattress covers and pillows, and NEVER smoke in bed. Keep a phone next to the bed that is programmed to dial 911 at the push of a button. If you have a chronic condition, you may want to sign on with an automatic call-in service. Typically the system includes a small pendant that connects directly to an emergency medical voice-response system. You should also make arrangements to stay in contact with someonefriend, neighbor, family memberon a regular schedule.    Fire Prevention   According to the [employer] (Smoke Alarms for Every) [facility], senior citizens are one of the two highest risk groups for death and serious injuries due to

## 2023-12-03 RX ORDER — SITAGLIPTIN AND METFORMIN HYDROCHLORIDE 1000; 100 MG/1; MG/1
1 TABLET, FILM COATED, EXTENDED RELEASE ORAL DAILY
Qty: 30 TABLET | Refills: 3
Start: 2023-12-03

## 2023-12-03 RX ORDER — GLIPIZIDE 10 MG/1
10 TABLET ORAL
Qty: 60 TABLET | Refills: 0
Start: 2023-12-03

## 2024-02-05 ENCOUNTER — OFFICE VISIT (OUTPATIENT)
Dept: FAMILY MEDICINE CLINIC | Age: 69
End: 2024-02-05
Payer: COMMERCIAL

## 2024-02-05 VITALS
DIASTOLIC BLOOD PRESSURE: 78 MMHG | HEART RATE: 68 BPM | OXYGEN SATURATION: 97 % | RESPIRATION RATE: 20 BRPM | SYSTOLIC BLOOD PRESSURE: 124 MMHG | WEIGHT: 218 LBS | BODY MASS INDEX: 29.53 KG/M2 | HEIGHT: 72 IN

## 2024-02-05 DIAGNOSIS — Z12.5 ENCOUNTER FOR SCREENING FOR MALIGNANT NEOPLASM OF PROSTATE: ICD-10-CM

## 2024-02-05 DIAGNOSIS — E11.42 TYPE 2 DIABETES MELLITUS WITH DIABETIC POLYNEUROPATHY, WITHOUT LONG-TERM CURRENT USE OF INSULIN (HCC): ICD-10-CM

## 2024-02-05 DIAGNOSIS — E11.65 TYPE 2 DIABETES MELLITUS WITH HYPERGLYCEMIA, WITHOUT LONG-TERM CURRENT USE OF INSULIN (HCC): Primary | ICD-10-CM

## 2024-02-05 DIAGNOSIS — B18.2 CHRONIC HEPATITIS C WITHOUT HEPATIC COMA (HCC): ICD-10-CM

## 2024-02-05 LAB — HBA1C MFR BLD: 10.6 %

## 2024-02-05 PROCEDURE — 99214 OFFICE O/P EST MOD 30 MIN: CPT | Performed by: FAMILY MEDICINE

## 2024-02-05 PROCEDURE — 1124F ACP DISCUSS-NO DSCNMKR DOCD: CPT | Performed by: FAMILY MEDICINE

## 2024-02-05 PROCEDURE — 83036 HEMOGLOBIN GLYCOSYLATED A1C: CPT | Performed by: FAMILY MEDICINE

## 2024-02-05 PROCEDURE — 3046F HEMOGLOBIN A1C LEVEL >9.0%: CPT | Performed by: FAMILY MEDICINE

## 2024-02-05 RX ORDER — TADALAFIL 20 MG/1
TABLET ORAL
COMMUNITY
Start: 2023-12-11

## 2024-02-05 RX ORDER — GLIPIZIDE 10 MG/1
10 TABLET ORAL
Qty: 60 TABLET | Refills: 0
Start: 2024-02-05

## 2024-02-05 RX ORDER — CYANOCOBALAMIN (VITAMIN B-12) 500 MCG
TABLET ORAL
COMMUNITY

## 2024-02-05 RX ORDER — CLINDAMYCIN PHOSPHATE 10 UG/ML
LOTION TOPICAL 2 TIMES DAILY
COMMUNITY

## 2024-02-05 RX ORDER — METOPROLOL TARTRATE 100 MG/1
100 TABLET ORAL 2 TIMES DAILY
COMMUNITY
Start: 2023-12-04

## 2024-02-05 RX ORDER — SITAGLIPTIN AND METFORMIN HYDROCHLORIDE 1000; 100 MG/1; MG/1
1 TABLET, FILM COATED, EXTENDED RELEASE ORAL DAILY
Qty: 30 TABLET | Refills: 3
Start: 2024-02-05

## 2024-02-05 RX ORDER — PANTOPRAZOLE SODIUM 40 MG/1
TABLET, DELAYED RELEASE ORAL
Qty: 30 TABLET | Refills: 5 | Status: SHIPPED | OUTPATIENT
Start: 2024-02-05

## 2024-02-05 RX ORDER — MINOCYCLINE HYDROCHLORIDE 100 MG/1
100 CAPSULE ORAL 2 TIMES DAILY
COMMUNITY

## 2024-02-05 ASSESSMENT — PATIENT HEALTH QUESTIONNAIRE - PHQ9
SUM OF ALL RESPONSES TO PHQ QUESTIONS 1-9: 2
SUM OF ALL RESPONSES TO PHQ9 QUESTIONS 1 & 2: 2
SUM OF ALL RESPONSES TO PHQ9 QUESTIONS 1 & 2: 2
SUM OF ALL RESPONSES TO PHQ QUESTIONS 1-9: 2
1. LITTLE INTEREST OR PLEASURE IN DOING THINGS: 2
2. FEELING DOWN, DEPRESSED OR HOPELESS: 0
1. LITTLE INTEREST OR PLEASURE IN DOING THINGS: MORE THAN HALF THE DAYS
SUM OF ALL RESPONSES TO PHQ QUESTIONS 1-9: 2
SUM OF ALL RESPONSES TO PHQ QUESTIONS 1-9: 2
2. FEELING DOWN, DEPRESSED OR HOPELESS: NOT AT ALL

## 2024-02-05 ASSESSMENT — ENCOUNTER SYMPTOMS
DIARRHEA: 0
SHORTNESS OF BREATH: 0
NAUSEA: 0
VOMITING: 0

## 2024-02-05 NOTE — PROGRESS NOTES
OFFICE PROGRESS NOTE      SUBJECTIVE:        Patient ID:   Nakia Moreno is a 68 y.o. male whopresents for   Chief Complaint   Patient presents with    Diabetes       HPI:   Patient is here to follow up on diabetes. Fasting blood sugars:120's Midday blood sugars: 140's.  Patient checks blood glucose 2 times per day.  Patient is not following diabetic diet. Patient is a nonsmoker. Last ophthalmology visit: 1/2023. Patient is taking a daily statin.     Patient has history of chronic hepatitis C. Has been stable.      Prior to Admission medications    Medication Sig Start Date End Date Taking? Authorizing Provider   tadalafil (CIALIS) 20 MG tablet TAKE ONE TABLET BY MOUTH AS DIRECTED. TAKE 1/2 TABLET BY MOUTH ONE HOUR PRIOR TO SEXUAL ACTIVITY. CAN TAKE UP TO 1 WHOLE TABLET TO REACH NAVEEN 12/11/23  Yes Trenton Foster MD   metoprolol (LOPRESSOR) 100 MG tablet Take 1 tablet by mouth 2 times daily 12/4/23  Yes Trenton Foster MD   minocycline (MINOCIN;DYNACIN) 100 MG capsule Take 1 capsule by mouth 2 times daily   Yes Trenton Foster MD   clindamycin (CLEOCIN T) 1 % lotion Apply topically 2 times daily Apply topically 2 times daily.   Yes Trenton Foster MD   Omega-3 Fatty Acids (FISH OIL) 300 MG CAPS Take by mouth   Yes Trenton Foster MD   pantoprazole (PROTONIX) 40 MG tablet TAKE ONE TABLET BY MOUTH EVERY MORNING 2/5/24  Yes Denise Reagan MD   mupirocin (BACTROBAN) 2 % ointment Apply topically 3 times daily. 2/5/24  Yes Denise Reagan MD   Semaglutide,0.25 or 0.5MG/DOS, 2 MG/3ML SOPN Inject 0.5 mg into the skin once a week 2/5/24  Yes Denise Reagan MD   SITagliptin-metFORMIN HCl ER (JANUMET XR) 100-1000 MG TB24 Take 1 tablet by mouth daily 2/5/24  Yes Denise Reagan MD   glipiZIDE (GLUCOTROL) 10 MG tablet Take 1 tablet by mouth 2 times daily (before meals) 2/5/24  Yes Denise Reagan MD   atorvastatin (LIPITOR) 80 MG

## 2024-02-14 LAB — DIABETIC RETINOPATHY: NEGATIVE

## 2024-04-11 NOTE — PATIENT INSTRUCTIONS
Padmini PÉREZ Check is here today for Medicare Wellness Visit    Concerns/symptoms: see above     Medications: medications verified and updated  Refills needed today? Yes, Medication Pended, Pharmacy verified     Tobacco history: verified    Advanced Directive:No not on file, needs to bring in a copy.    BP >140/90? No    Care Teams Updated? Yes    Patient Preference for result Communication via:    Cell Phone:   Telephone Information:   Mobile [phone]     Okay to leave a message containing results? Yes    Health Maintenance Due   Topic Date Due    Medicare Advantage- Medicare Wellness Visit  01/01/2024    Depression Screening  04/11/2024      Patient is due for the topics as listed above and wishes to proceed with them. .    Immunization History   Administered Date(s) Administered    COVID Moderna 0.5 mL 12Y+ 03/08/2021, 04/06/2021, 01/06/2022    COVID Pfizer 12Y+ 06/21/2022    COVID Pfizer/Comirnaty 12+ ([id number]) 10/25/2023    Influenz, recombinant quadrivalent, egg-free, preserve-free (FLUBLOK) 09/23/2020    Influenza, High Dose quadrivalent, preserve-free 10/20/2021, 10/25/2023    Influenza, high dose seasonal, preservative-free 11/11/2017, 12/31/2018, 10/01/2019    Influenza, quadrivalent, preserve-free 11/15/2022    Influenza, seasonal, injectable, trivalent 11/04/2003, 11/21/2005, 12/02/2010, 11/01/2011, 11/19/2013, 10/30/2014, 10/06/2016    Pneumococcal Conjugate 13 Valent Vacc (Prevnar 13) 12/22/2015    Pneumococcal Polysaccharide Vacc (Pneumovax 23) 11/04/2003, 12/18/2014, 03/03/2020    Respiratory Syncytial Virus Abrysvo 10/26/2023    Shingrix (Shingles Zoster) 09/23/2020, 12/29/2020    Tdap 06/24/2008    Zostavax (Zoster Shingles) 12/28/2015         PHQ 2:  PHQ 2 Score Adult PHQ 2 Score Adult PHQ 2 Interpretation Little interest or pleasure in activity?   4/11/2023   1:56 PM 0 No further screening needed 0       PHQ 9:  PHQ 9 Score Adult PHQ 9 Score Adult PHQ 9 Interpretation   11/15/2022   2:12 PM 10  Moderate Depression     Immunization History   Administered Date(s) Administered    COVID Moderna 0.5 mL 12Y+ 03/08/2021, 04/06/2021, 01/06/2022    COVID Pfizer 12Y+ 06/21/2022    COVID Pfizer/Comirnaty 12+ ([id number]) 10/25/2023    Influenz, recombinant quadrivalent, egg-free, preserve-free (FLUBLOK) 09/23/2020    Influenza, High Dose quadrivalent, preserve-free 10/20/2021, 10/25/2023    Influenza, high dose seasonal, preservative-free 11/11/2017, 12/31/2018, 10/01/2019    Influenza, quadrivalent, preserve-free 11/15/2022    Influenza, seasonal, injectable, trivalent 11/04/2003, 11/21/2005, 12/02/2010, 11/01/2011, 11/19/2013, 10/30/2014, 10/06/2016    Pneumococcal Conjugate 13 Valent Vacc (Prevnar 13) 12/22/2015    Pneumococcal Polysaccharide Vacc (Pneumovax 23) 11/04/2003, 12/18/2014, 03/03/2020    Respiratory Syncytial Virus Abrysvo 10/26/2023    Shingrix (Shingles Zoster) 09/23/2020, 12/29/2020    Tdap 06/24/2008    Zostavax (Zoster Shingles) 12/28/2015       Medicare HRA:              Hearing Exam Performed: yes / no: No    Vision Exam Performed: yes / no: No      PHQ 2:  PHQ 2 Score Adult PHQ 2 Score Adult PHQ 2 Interpretation Little interest or pleasure in activity?   4/11/2023   1:56 PM 0 No further screening needed 0       PHQ 9:  PHQ 9 Score Adult PHQ 9 Score Adult PHQ 9 Interpretation   11/15/2022   2:12 PM 10 Moderate Depression       The recording was initiated after a verbal consent was obtained from the patient to record this visit for documentation in their clinical record.    the plate format. Talk to your doctor, a dietitian, or a diabetes educator about your concerns. Carbohydrate counting  With carbohydrate counting, you plan meals based on the amount of carbohydrate in each food. Carbohydrate raises blood sugar higher and more quickly than any other nutrient. It is found in desserts, breads and cereals, and fruit. It's also found in starchy vegetables such as potatoes and corn, grains such as rice and pasta, and milk and yogurt. Spreading carbohydrate throughout the day helps keep your blood sugar levels within your target range. Your daily amount depends on several things, including your weight, how active you are, which diabetes medicines you take, and what your goals are for your blood sugar levels. A registered dietitian or diabetes educator can help you plan how much carbohydrate to include in each meal and snack. A guideline for your daily amount of carbohydrate is:  · 45 to 60 grams at each meal. That's about the same as 3 to 4 carbohydrate servings. · 15 to 20 grams at each snack. That's about the same as 1 carbohydrate serving. The Nutrition Facts label on packaged foods tells you how much carbohydrate is in a serving of the food. First, look at the serving size on the food label. Is that the amount you eat in a serving? All of the nutrition information on a food label is based on that serving size. So if you eat more or less than that, you'll need to adjust the other numbers. Total carbohydrate is the next thing you need to look for on the label. If you count carbohydrate servings, one serving of carbohydrate is 15 grams. For foods that don't come with labels, such as fresh fruits and vegetables, you'll need a guide that lists carbohydrate in these foods. Ask your doctor, dietitian, or diabetes educator about books or other nutrition guides you can use.   If you take insulin, you need to know how many grams of carbohydrate are in a meal. This lets you know how much rapid-acting insulin to take before you eat. If you use an insulin pump, you get a constant rate of insulin during the day. So the pump must be programmed at meals to give you extra insulin to cover the rise in blood sugar after meals. When you know how much carbohydrate you will eat, you can take the right amount of insulin. Or, if you always use the same amount of insulin, you need to make sure that you eat the same amount of carbohydrate at meals. If you need more help to understand carbohydrate counting and food labels, ask your doctor, dietitian, or diabetes educator. How do you get started with meal planning? Here are some tips to get started:  · Plan your meals a week at a time. Don't forget to include snacks too. · Use cookbooks or online recipes to plan several main meals. Plan some quick meals for busy nights. You also can double some recipes that freeze well. Then you can save half for other busy nights when you don't have time to cook. · Make sure you have the ingredients you need for your recipes. If you're running low on basic items, put these items on your shopping list too. · List foods that you use to make breakfasts, lunches, and snacks. List plenty of fruits and vegetables. · Post this list on the refrigerator. Add to it as you think of more things you need. · Take the list to the store to do your weekly shopping. Follow-up care is a key part of your treatment and safety. Be sure to make and go to all appointments, and call your doctor if you are having problems. It's also a good idea to know your test results and keep a list of the medicines you take. Where can you learn more? Go to [email or web address] and sign in to your [email or web address] account. Enter C442 in the [employer] box to learn more about \"Learning About Meal Planning for Diabetes. \"     If you do not have an account, please click on the \"Sign Up Now\" link.   Current as of: December 20,

## 2024-04-15 ENCOUNTER — OFFICE VISIT (OUTPATIENT)
Dept: FAMILY MEDICINE CLINIC | Age: 69
End: 2024-04-15

## 2024-04-15 VITALS
DIASTOLIC BLOOD PRESSURE: 78 MMHG | SYSTOLIC BLOOD PRESSURE: 124 MMHG | BODY MASS INDEX: 29.66 KG/M2 | HEIGHT: 72 IN | WEIGHT: 219 LBS | HEART RATE: 61 BPM | OXYGEN SATURATION: 97 % | RESPIRATION RATE: 20 BRPM

## 2024-04-15 DIAGNOSIS — Z12.5 ENCOUNTER FOR SCREENING FOR MALIGNANT NEOPLASM OF PROSTATE: ICD-10-CM

## 2024-04-15 DIAGNOSIS — E11.65 TYPE 2 DIABETES MELLITUS WITH HYPERGLYCEMIA, WITHOUT LONG-TERM CURRENT USE OF INSULIN (HCC): Primary | ICD-10-CM

## 2024-04-15 LAB — HBA1C MFR BLD: 9.7 %

## 2024-04-15 RX ORDER — SITAGLIPTIN AND METFORMIN HYDROCHLORIDE 1000; 100 MG/1; MG/1
1 TABLET, FILM COATED, EXTENDED RELEASE ORAL DAILY
Qty: 30 TABLET | Refills: 3
Start: 2024-04-15

## 2024-04-15 RX ORDER — GLIPIZIDE 10 MG/1
TABLET ORAL
Qty: 90 TABLET | Refills: 3 | Status: SHIPPED | OUTPATIENT
Start: 2024-04-15

## 2024-04-15 SDOH — ECONOMIC STABILITY: FOOD INSECURITY: WITHIN THE PAST 12 MONTHS, YOU WORRIED THAT YOUR FOOD WOULD RUN OUT BEFORE YOU GOT MONEY TO BUY MORE.: NEVER TRUE

## 2024-04-15 SDOH — ECONOMIC STABILITY: HOUSING INSECURITY
IN THE LAST 12 MONTHS, WAS THERE A TIME WHEN YOU DID NOT HAVE A STEADY PLACE TO SLEEP OR SLEPT IN A SHELTER (INCLUDING NOW)?: NO

## 2024-04-15 SDOH — ECONOMIC STABILITY: INCOME INSECURITY: HOW HARD IS IT FOR YOU TO PAY FOR THE VERY BASICS LIKE FOOD, HOUSING, MEDICAL CARE, AND HEATING?: NOT HARD AT ALL

## 2024-04-15 SDOH — ECONOMIC STABILITY: FOOD INSECURITY: WITHIN THE PAST 12 MONTHS, THE FOOD YOU BOUGHT JUST DIDN'T LAST AND YOU DIDN'T HAVE MONEY TO GET MORE.: NEVER TRUE

## 2024-04-15 ASSESSMENT — ENCOUNTER SYMPTOMS
SHORTNESS OF BREATH: 0
VOMITING: 0
NAUSEA: 0
DIARRHEA: 0

## 2024-04-15 NOTE — PROGRESS NOTES
Duplicate - prescription request refused.   w/Device KIT Check blood sugar BID 1 kit 0    Alcohol Swabs (ALCOHOL PREP) 70 % PADS Use BID for glucose testing 200 each 12    pregabalin (LYRICA) 75 MG capsule Take 1 capsule by mouth 2 times daily. Has not started as of 3/20/23      Multiple Vitamins-Minerals (THERAPEUTIC MULTIVITAMIN-MINERALS) tablet Take 1 tablet by mouth daily      Tenapanor HCl (IBSRELA) 50 MG TABS Take 50 mg by mouth      blood glucose test strips (ASCENSIA AUTODISC VI;ONE TOUCH ULTRA TEST VI) strip Check blood sugar  each 12    ONE TOUCH ULTRASOFT LANCETS MISC Check blood sugar  each 12    triamcinolone (KENALOG) 0.1 % cream Apply topically 2 times daily for up to 2 weeks then as needed for flareups. 80 g 1    minocycline (MINOCIN;DYNACIN) 100 MG capsule Take 1 capsule by mouth 2 times daily (Patient not taking: Reported on 4/15/2024)       No current facility-administered medications for this visit.         Review Of Systems:    Review of Systems   Eyes:  Negative for visual disturbance.   Respiratory:  Negative for shortness of breath.    Cardiovascular:  Negative for chest pain, palpitations and leg swelling.   Gastrointestinal:  Negative for diarrhea, nausea and vomiting.   Genitourinary:  Negative for difficulty urinating, dysuria and frequency.   Skin:  Negative for rash.        +itchy   Psychiatric/Behavioral:  Negative for dysphoric mood.            OBJECTIVE:     VS:  Wt Readings from Last 3 Encounters:   04/15/24 99.3 kg (219 lb)   02/05/24 98.9 kg (218 lb)   11/29/23 98.4 kg (217 lb)     Vitals:    04/15/24 0902   BP: 124/78   Pulse: 61   Resp: 20   SpO2: 97%       Physical Exam  Vitals reviewed.   Constitutional:       General: He is not in acute distress.     Appearance: He is well-developed.   Neck:      Vascular: No carotid bruit.   Cardiovascular:      Rate and Rhythm: Normal rate and regular rhythm.      Heart sounds: Normal heart sounds. No murmur heard.     No gallop.   Pulmonary:      Effort: Pulmonary

## 2024-04-22 LAB
ALBUMIN: 4.5 G/DL (ref 3.5–5.2)
ALP BLD-CCNC: 95 U/L (ref 40–129)
ALT SERPL-CCNC: 47 U/L (ref 0–40)
ANION GAP SERPL CALCULATED.3IONS-SCNC: 16 MMOL/L (ref 7–16)
AST SERPL-CCNC: 38 U/L (ref 0–39)
BILIRUB SERPL-MCNC: 0.5 MG/DL (ref 0–1.2)
BUN BLDV-MCNC: 23 MG/DL (ref 6–23)
CALCIUM SERPL-MCNC: 9.6 MG/DL (ref 8.6–10.2)
CHLORIDE BLD-SCNC: 100 MMOL/L (ref 98–107)
CHOLESTEROL: 208 MG/DL
CO2: 21 MMOL/L (ref 22–29)
CREAT SERPL-MCNC: 1.1 MG/DL (ref 0.7–1.2)
GFR SERPL CREATININE-BSD FRML MDRD: 72 ML/MIN/1.73M2
GLUCOSE BLD-MCNC: 276 MG/DL (ref 74–99)
HCT VFR BLD CALC: 45.8 % (ref 37–54)
HDLC SERPL-MCNC: 34 MG/DL
HEMOGLOBIN: 15.2 G/DL (ref 12.5–16.5)
LDL CHOLESTEROL: 122 MG/DL
MCH RBC QN AUTO: 31.7 PG (ref 26–35)
MCHC RBC AUTO-ENTMCNC: 33.2 G/DL (ref 32–34.5)
MCV RBC AUTO: 95.6 FL (ref 80–99.9)
PDW BLD-RTO: 12.8 % (ref 11.5–15)
PLATELET # BLD: 393 K/UL (ref 130–450)
PMV BLD AUTO: 11.4 FL (ref 7–12)
POTASSIUM SERPL-SCNC: 4.3 MMOL/L (ref 3.5–5)
PROSTATE SPECIFIC ANTIGEN: 4.5 NG/ML (ref 0–4)
RBC # BLD: 4.79 M/UL (ref 3.8–5.8)
SODIUM BLD-SCNC: 137 MMOL/L (ref 132–146)
TOTAL PROTEIN: 7.3 G/DL (ref 6.4–8.3)
TRIGL SERPL-MCNC: 258 MG/DL
TSH SERPL DL<=0.05 MIU/L-ACNC: 1.96 UIU/ML (ref 0.27–4.2)
VLDLC SERPL CALC-MCNC: 52 MG/DL
WBC # BLD: 9 K/UL (ref 4.5–11.5)

## 2024-05-30 ENCOUNTER — OFFICE VISIT (OUTPATIENT)
Dept: FAMILY MEDICINE CLINIC | Age: 69
End: 2024-05-30
Payer: COMMERCIAL

## 2024-05-30 VITALS
DIASTOLIC BLOOD PRESSURE: 81 MMHG | HEART RATE: 61 BPM | RESPIRATION RATE: 18 BRPM | SYSTOLIC BLOOD PRESSURE: 135 MMHG | TEMPERATURE: 97.8 F | OXYGEN SATURATION: 98 % | HEIGHT: 72 IN | WEIGHT: 219 LBS | BODY MASS INDEX: 29.66 KG/M2

## 2024-05-30 DIAGNOSIS — M54.50 ACUTE MIDLINE LOW BACK PAIN WITHOUT SCIATICA: Primary | ICD-10-CM

## 2024-05-30 PROCEDURE — 99214 OFFICE O/P EST MOD 30 MIN: CPT

## 2024-05-30 PROCEDURE — 1124F ACP DISCUSS-NO DSCNMKR DOCD: CPT

## 2024-05-30 RX ORDER — LIDOCAINE 50 MG/G
1 PATCH TOPICAL DAILY
Qty: 10 PATCH | Refills: 0 | Status: SHIPPED | OUTPATIENT
Start: 2024-05-30 | End: 2024-06-09

## 2024-05-30 NOTE — PROGRESS NOTES
Chief Complaint:   Back Pain (2 days, was just seen in the ED and came here)      History of Present Illness   Source of history provided by:  patient.     Nakia Moreno is a 68 y.o. old male who presents to the walk in clinic for evaluation of lower back pain for the past 2 days. Pt denies any known injury.Pt states the pain is worse with movement and improves with lying flat. There is mild radiation of the pain into the left sided groin.  He was just discharged from [place] emergency department today and received full workup with negative abnormalities.  He reports that they gave him morphine shot and discharged him.  Pt denies any bowel/bladder incontinence, abdominal pain, hematuria, N/V/D, fever, chills, HA, neck pain, recent illness, dysuria, or lethargy.    Review of Systems    Unless otherwise stated in this report or unable to obtain because of the patient's clinical or mental status as evidenced by the medical record, this patients's positive and negative responses for Review of Systems, constitutional, psych, eyes, ENT, cardiovascular, respiratory, gastrointestinal, neurological, genitourinary, musculoskeletal, integument systems and systems related to the presenting problem are either stated in the preceding or were not pertinent or were negative for the symptoms and/or complaints related to the medical problem.    Past Medical History:  has a past medical history of BPH (benign prostatic hyperplasia), Chronic back pain, DDD (degenerative disc disease), cervical, Depression, Fibromyalgia, GERD (gastroesophageal reflux disease), Headache, Hepatitis C, Irritable bowel syndrome, Joint pain, Memory difficulties, Neck pain, Neuropathy of foot, Osteoarthritis, Type II or unspecified type diabetes mellitus without mention of complication, not stated as uncontrolled, Varicose veins of bilateral lower extremities with pain, and Varicose veins of leg with pain, right.   Past Surgical History:  has a past

## 2024-06-03 DIAGNOSIS — E11.65 TYPE 2 DIABETES MELLITUS WITH HYPERGLYCEMIA, WITHOUT LONG-TERM CURRENT USE OF INSULIN (HCC): ICD-10-CM

## 2024-06-03 NOTE — TELEPHONE ENCOUNTER
Patient called for a refill.    Last seen 4/15/2024  Next appt 6/13/2024    Requested Prescriptions     Pending Prescriptions Disp Refills    SITagliptin-metFORMIN HCl ER (JANUMET XR) 100-1000 MG TB24 30 tablet 3     Sig: Take 1 tablet by mouth daily      [facility]

## 2024-06-04 RX ORDER — SITAGLIPTIN AND METFORMIN HYDROCHLORIDE 1000; 100 MG/1; MG/1
1 TABLET, FILM COATED, EXTENDED RELEASE ORAL DAILY
Qty: 30 TABLET | Refills: 3 | Status: SHIPPED | OUTPATIENT
Start: 2024-06-04

## 2024-06-13 ENCOUNTER — OFFICE VISIT (OUTPATIENT)
Dept: FAMILY MEDICINE CLINIC | Age: 69
End: 2024-06-13

## 2024-06-13 VITALS
SYSTOLIC BLOOD PRESSURE: 124 MMHG | BODY MASS INDEX: 29.66 KG/M2 | HEIGHT: 72 IN | DIASTOLIC BLOOD PRESSURE: 70 MMHG | WEIGHT: 219 LBS | RESPIRATION RATE: 20 BRPM | OXYGEN SATURATION: 97 % | HEART RATE: 106 BPM

## 2024-06-13 DIAGNOSIS — E11.65 TYPE 2 DIABETES MELLITUS WITH HYPERGLYCEMIA, WITHOUT LONG-TERM CURRENT USE OF INSULIN (HCC): Primary | ICD-10-CM

## 2024-06-13 DIAGNOSIS — L30.9 DERMATITIS: ICD-10-CM

## 2024-06-13 LAB — HBA1C MFR BLD: 9 %

## 2024-06-13 RX ORDER — INSULIN GLARGINE 100 [IU]/ML
INJECTION, SOLUTION SUBCUTANEOUS
COMMUNITY
Start: 2024-05-20 | End: 2024-06-13 | Stop reason: SDUPTHER

## 2024-06-13 RX ORDER — SITAGLIPTIN AND METFORMIN HYDROCHLORIDE 1000; 100 MG/1; MG/1
1 TABLET, FILM COATED, EXTENDED RELEASE ORAL DAILY
Qty: 30 TABLET | Refills: 3
Start: 2024-06-13

## 2024-06-13 RX ORDER — TRIAMCINOLONE ACETONIDE 1 MG/G
CREAM TOPICAL
Qty: 80 G | Refills: 0 | Status: SHIPPED | OUTPATIENT
Start: 2024-06-13

## 2024-06-13 RX ORDER — APIXABAN 5 MG/1
5 TABLET, FILM COATED ORAL EVERY 12 HOURS
COMMUNITY
Start: 2024-05-20

## 2024-06-13 RX ORDER — TAMSULOSIN HYDROCHLORIDE 0.4 MG/1
CAPSULE ORAL
COMMUNITY
Start: 2024-05-29

## 2024-06-13 RX ORDER — INSULIN GLARGINE 100 [IU]/ML
20 INJECTION, SOLUTION SUBCUTANEOUS NIGHTLY
Qty: 5 ADJUSTABLE DOSE PRE-FILLED PEN SYRINGE | Refills: 5 | Status: SHIPPED | OUTPATIENT
Start: 2024-06-13

## 2024-06-13 RX ORDER — GLIPIZIDE 10 MG/1
TABLET ORAL
Qty: 90 TABLET | Refills: 3
Start: 2024-06-13

## 2024-06-13 ASSESSMENT — ENCOUNTER SYMPTOMS
VOMITING: 0
SHORTNESS OF BREATH: 0
NAUSEA: 0
DIARRHEA: 0
BACK PAIN: 1

## 2024-06-13 NOTE — PROGRESS NOTES
and back pain.   Skin:  Positive for rash (both forearms).   Psychiatric/Behavioral:  Negative for dysphoric mood.            OBJECTIVE:     VS:  Wt Readings from Last 3 Encounters:   06/13/24 99.3 kg (219 lb)   05/30/24 99.3 kg (219 lb)   04/15/24 99.3 kg (219 lb)     Vitals:    06/13/24 0920   BP: 124/70   Pulse: (!) 106   Resp: 20   SpO2: 97%       Physical Exam  Vitals reviewed.   Constitutional:       General: He is not in acute distress.     Appearance: He is well-developed.   Neck:      Vascular: No carotid bruit.   Cardiovascular:      Rate and Rhythm: Normal rate. Rhythm irregular.      Heart sounds: Normal heart sounds. No murmur heard.     No gallop.   Pulmonary:      Effort: Pulmonary effort is normal.      Breath sounds: Normal breath sounds. No wheezing or rales.   Abdominal:      General: Bowel sounds are normal. There is no distension.      Palpations: Abdomen is soft.      Tenderness: There is no abdominal tenderness.   Musculoskeletal:      Cervical back: Neck supple.      Right lower leg: No edema.      Left lower leg: No edema.   Skin:     General: Skin is warm and dry.   Neurological:      Mental Status: He is alert and oriented to person, place, and time.           Results for orders placed or performed in visit on 06/13/24   POCT glycosylated hemoglobin (Hb A1C)   Result Value Ref Range    Hemoglobin A1C 9.0 %         Nakia was seen today for diabetes.    Diagnoses and all orders for this visit:    Type 2 diabetes mellitus with hyperglycemia, without long-term current use of insulin ([facility])  -     POCT glycosylated hemoglobin (Hb A1C)  -     insulin glargine (LANTUS SOLOSTAR) 100 UNIT/ML injection pen; Inject 20 Units into the skin nightly  -     glipiZIDE (GLUCOTROL) 10 MG tablet; 20 mg po qam, 10 mg po qpm  -     SITagliptin-metFORMIN HCl ER (JANUMET XR) 100-1000 MG TB24; Take 1 tablet by mouth daily    Dermatitis  -     triamcinolone (KENALOG) 0.1 % cream; Apply topically 2 times daily for

## 2024-06-14 ENCOUNTER — TELEPHONE (OUTPATIENT)
Dept: FAMILY MEDICINE CLINIC | Age: 69
End: 2024-06-14

## 2024-06-14 NOTE — TELEPHONE ENCOUNTER
Raegan from [facility] called concerned with pt's blood sugar levels. She stated when she spoke with him today, his he stated his levels were between 300-400. She is asking for someone to call him regarding this high readings. She also asked if pt was seen through endocrinology.    I called pt back to verify information given. He said he was called by [facility] to check on him and BG levels. He stated he did tell her that they were close to 300 this morning. He said currently it was 254. I told him that if his bloods sugars get to a 300-400 range that he must seek medical assistance via ED. He was agreeable.     Last seen 6/13/2024  Next appt 8/15/2024    Requested Prescriptions      No prescriptions requested or ordered in this encounter

## 2024-06-14 NOTE — TELEPHONE ENCOUNTER
I explained to patient the risks and benefits of his continuous glucometer since his numbers have been high due to his compliance issues for quite some time but now he sees the readings more often and it scares him.  I gave him instructions to increase his insulin at his visit this week.

## 2024-09-16 ENCOUNTER — OFFICE VISIT (OUTPATIENT)
Dept: FAMILY MEDICINE CLINIC | Age: 69
End: 2024-09-16
Payer: COMMERCIAL

## 2024-09-16 VITALS
WEIGHT: 211 LBS | OXYGEN SATURATION: 96 % | HEIGHT: 72 IN | DIASTOLIC BLOOD PRESSURE: 84 MMHG | SYSTOLIC BLOOD PRESSURE: 132 MMHG | BODY MASS INDEX: 28.58 KG/M2 | RESPIRATION RATE: 20 BRPM | HEART RATE: 65 BPM

## 2024-09-16 DIAGNOSIS — E11.65 TYPE 2 DIABETES MELLITUS WITH HYPERGLYCEMIA, WITHOUT LONG-TERM CURRENT USE OF INSULIN (HCC): ICD-10-CM

## 2024-09-16 DIAGNOSIS — Z00.00 MEDICARE ANNUAL WELLNESS VISIT, SUBSEQUENT: Primary | ICD-10-CM

## 2024-09-16 LAB — HBA1C MFR BLD: 8.3 %

## 2024-09-16 PROCEDURE — 3052F HG A1C>EQUAL 8.0%<EQUAL 9.0%: CPT | Performed by: FAMILY MEDICINE

## 2024-09-16 PROCEDURE — G0439 PPPS, SUBSEQ VISIT: HCPCS | Performed by: FAMILY MEDICINE

## 2024-09-16 PROCEDURE — 1124F ACP DISCUSS-NO DSCNMKR DOCD: CPT | Performed by: FAMILY MEDICINE

## 2024-09-16 PROCEDURE — 83036 HEMOGLOBIN GLYCOSYLATED A1C: CPT | Performed by: FAMILY MEDICINE

## 2024-09-16 RX ORDER — GLIPIZIDE 10 MG/1
TABLET ORAL
Qty: 90 TABLET | Refills: 4
Start: 2024-09-16

## 2024-09-16 RX ORDER — BENZONATATE 200 MG/1
200 CAPSULE ORAL 3 TIMES DAILY PRN
COMMUNITY

## 2024-09-16 RX ORDER — SITAGLIPTIN AND METFORMIN HYDROCHLORIDE 1000; 100 MG/1; MG/1
1 TABLET, FILM COATED, EXTENDED RELEASE ORAL DAILY
Qty: 30 TABLET | Refills: 3
Start: 2024-09-16

## 2024-09-16 RX ORDER — DOXYCYCLINE 100 MG/1
100 CAPSULE ORAL 2 TIMES DAILY
COMMUNITY

## 2024-09-16 RX ORDER — INSULIN GLARGINE 100 [IU]/ML
30 INJECTION, SOLUTION SUBCUTANEOUS NIGHTLY
Qty: 5 ADJUSTABLE DOSE PRE-FILLED PEN SYRINGE | Refills: 5 | Status: SHIPPED | OUTPATIENT
Start: 2024-09-16

## 2024-09-16 RX ORDER — ORAL SEMAGLUTIDE 3 MG/1
TABLET ORAL
COMMUNITY
Start: 2024-06-28 | End: 2024-09-16

## 2024-09-16 ASSESSMENT — PATIENT HEALTH QUESTIONNAIRE - PHQ9
SUM OF ALL RESPONSES TO PHQ9 QUESTIONS 1 & 2: 0
2. FEELING DOWN, DEPRESSED OR HOPELESS: NOT AT ALL
SUM OF ALL RESPONSES TO PHQ QUESTIONS 1-9: 0
1. LITTLE INTEREST OR PLEASURE IN DOING THINGS: NOT AT ALL
SUM OF ALL RESPONSES TO PHQ QUESTIONS 1-9: 0

## 2024-09-16 ASSESSMENT — LIFESTYLE VARIABLES: HOW OFTEN DO YOU HAVE A DRINK CONTAINING ALCOHOL: NEVER

## 2024-10-09 NOTE — TELEPHONE ENCOUNTER
Last seen 9/16/2024  Next appt 11/21/2024    Requested Prescriptions     Pending Prescriptions Disp Refills    diclofenac sodium (VOLTAREN) 1 %  g 3     Sig: APPLY 2 GRAMS TOPICALLY TWICE DAILY to back      [facility]/ [address]

## 2024-10-30 ENCOUNTER — TELEPHONE (OUTPATIENT)
Dept: VASCULAR SURGERY | Age: 69
End: 2024-10-30

## 2024-10-30 NOTE — TELEPHONE ENCOUNTER
Received referral from KAVYA Redmond NP regarding PVD, left message for patient to return call to schedule with Dr. Mcdaniels, pt know to him.

## 2024-11-29 DIAGNOSIS — E11.65 TYPE 2 DIABETES MELLITUS WITH HYPERGLYCEMIA, WITHOUT LONG-TERM CURRENT USE OF INSULIN (HCC): Primary | ICD-10-CM

## 2024-11-29 NOTE — TELEPHONE ENCOUNTER
Patient called asking for a RX for his Insulin Pen Needles.  Patient informed me that his previous provider ordered 31 g x 8 mm pen needles.    Name of Medication(s) Requested:  Requested Prescriptions     Pending Prescriptions Disp Refills    Insulin Pen Needle 31G X 8 MM MISC 100 each 3     Si each by Does not apply route daily       Medication is on current medication list No    Dosage and directions were verified? Yes    Quantity verified: 90 day supply     Pharmacy Verified?  Yes    Last Appointment:  2024    Future appts:  Future Appointments   Date Time Provider Department Center   2024  1:30 PM Denise Reagan MD [place] [facility] DEP   2024  1:00 PM Anthony Johansen MD [facility]   2025 10:30 AM Denise Reagan MD [place] [facility] DEP        (If no appt send self scheduling link. .REFILLAPPT)  Scheduling request sent?     [] Yes  [x] No    Does patient need updated?  [] Yes  [x] No

## 2024-12-12 ENCOUNTER — OFFICE VISIT (OUTPATIENT)
Dept: FAMILY MEDICINE CLINIC | Age: 69
End: 2024-12-12
Payer: COMMERCIAL

## 2024-12-12 VITALS
BODY MASS INDEX: 30.48 KG/M2 | OXYGEN SATURATION: 96 % | WEIGHT: 225 LBS | DIASTOLIC BLOOD PRESSURE: 70 MMHG | TEMPERATURE: 97.9 F | HEIGHT: 72 IN | HEART RATE: 57 BPM | RESPIRATION RATE: 16 BRPM | SYSTOLIC BLOOD PRESSURE: 114 MMHG

## 2024-12-12 DIAGNOSIS — E11.65 TYPE 2 DIABETES MELLITUS WITH HYPERGLYCEMIA, WITHOUT LONG-TERM CURRENT USE OF INSULIN (HCC): Primary | ICD-10-CM

## 2024-12-12 DIAGNOSIS — L40.9 PSORIASIS: ICD-10-CM

## 2024-12-12 PROCEDURE — 1036F TOBACCO NON-USER: CPT | Performed by: FAMILY MEDICINE

## 2024-12-12 PROCEDURE — 99214 OFFICE O/P EST MOD 30 MIN: CPT | Performed by: FAMILY MEDICINE

## 2024-12-12 PROCEDURE — G8417 CALC BMI ABV UP PARAM F/U: HCPCS | Performed by: FAMILY MEDICINE

## 2024-12-12 PROCEDURE — 3052F HG A1C>EQUAL 8.0%<EQUAL 9.0%: CPT | Performed by: FAMILY MEDICINE

## 2024-12-12 PROCEDURE — G8482 FLU IMMUNIZE ORDER/ADMIN: HCPCS | Performed by: FAMILY MEDICINE

## 2024-12-12 PROCEDURE — 1124F ACP DISCUSS-NO DSCNMKR DOCD: CPT | Performed by: FAMILY MEDICINE

## 2024-12-12 PROCEDURE — G8427 DOCREV CUR MEDS BY ELIG CLIN: HCPCS | Performed by: FAMILY MEDICINE

## 2024-12-12 PROCEDURE — 2022F DILAT RTA XM EVC RTNOPTHY: CPT | Performed by: FAMILY MEDICINE

## 2024-12-12 PROCEDURE — 3017F COLORECTAL CA SCREEN DOC REV: CPT | Performed by: FAMILY MEDICINE

## 2024-12-12 RX ORDER — TRIAMCINOLONE ACETONIDE 1 MG/G
OINTMENT TOPICAL
Qty: 80 G | Refills: 0 | Status: SHIPPED | OUTPATIENT
Start: 2024-12-12

## 2024-12-12 NOTE — PROGRESS NOTES
glycosylated hemoglobin (Hb A1C)   Result Value Ref Range    Hemoglobin A1C 8.3 %       Assessment & Plan  1. Diabetes mellitus.  Her home blood glucose readings have shown a commendable decrease, indicating effective management of her condition. She is currently on Lantus 30 units at night, glipizide, and Janumet in the morning. An A1c test will be conducted during her next visit.    2. Psoriasis.  She presents with classic symptoms of psoriasis, including rashes on the elbows and scalp. A prescription for triamcinolone ointment has been provided, to be applied twice daily as needed. This treatment should alleviate the itching and redness associated with the condition.    3. Neuropathy.  She continues to experience pain in her feet due to neuropathy. She is currently taking Lyrica for nerve pain management. She was advised to consider diabetic foot creams for additional relief.    Follow-up  The patient will follow up in 2 months.      Nakia was seen today for diabetes.    Diagnoses and all orders for this visit:    Type 2 diabetes mellitus with hyperglycemia, without long-term current use of insulin ([facility])  -      DIABETES FOOT EXAM  -     insulin glargine (LANTUS SOLOSTAR) 100 UNIT/ML injection pen; Inject 30 Units into the skin nightly  -     glipiZIDE (GLUCOTROL) 10 MG tablet; 20 mg po qam, 10 mg po qpm  -     SITagliptin-metFORMIN HCl ER (JANUMET XR) 100-1000 MG TB24; Take 1 tablet by mouth daily    Psoriasis  -     triamcinolone (KENALOG) 0.1 % ointment; Apply topically 2 times daily to affected area.          /MyChart follow up if tests abnormal.    Return in about 2 months (around 2/12/2025) for diabetes. or sooner if necessary.            I have reviewed my findings and recommendations with Nakia.     Denise Reagan MD, M.D

## 2024-12-13 RX ORDER — SITAGLIPTIN AND METFORMIN HYDROCHLORIDE 1000; 100 MG/1; MG/1
1 TABLET, FILM COATED, EXTENDED RELEASE ORAL DAILY
Qty: 30 TABLET | Refills: 3
Start: 2024-12-13

## 2024-12-13 RX ORDER — INSULIN GLARGINE 100 [IU]/ML
30 INJECTION, SOLUTION SUBCUTANEOUS NIGHTLY
Qty: 5 ADJUSTABLE DOSE PRE-FILLED PEN SYRINGE | Refills: 5
Start: 2024-12-13

## 2024-12-13 RX ORDER — GLIPIZIDE 10 MG/1
TABLET ORAL
Qty: 90 TABLET | Refills: 4
Start: 2024-12-13

## 2024-12-19 ENCOUNTER — HOSPITAL ENCOUNTER (OUTPATIENT)
Dept: ULTRASOUND IMAGING | Age: 69
Discharge: HOME | End: 2024-12-19
Attending: INTERNAL MEDICINE
Payer: COMMERCIAL

## 2024-12-19 DIAGNOSIS — R10.84 GENERALIZED ABDOMINAL PAIN: ICD-10-CM

## 2024-12-19 PROCEDURE — 76705 ECHO EXAM OF ABDOMEN: CPT

## 2024-12-23 ENCOUNTER — TELEPHONE (OUTPATIENT)
Dept: VASCULAR SURGERY | Age: 69
End: 2024-12-23

## 2024-12-27 ENCOUNTER — OFFICE VISIT (OUTPATIENT)
Age: 69
End: 2024-12-27
Payer: COMMERCIAL

## 2024-12-27 DIAGNOSIS — M79.605 BILATERAL LEG PAIN: Primary | ICD-10-CM

## 2024-12-27 DIAGNOSIS — M79.604 BILATERAL LEG PAIN: Primary | ICD-10-CM

## 2024-12-27 PROCEDURE — 3017F COLORECTAL CA SCREEN DOC REV: CPT | Performed by: SURGERY

## 2024-12-27 PROCEDURE — 1124F ACP DISCUSS-NO DSCNMKR DOCD: CPT | Performed by: SURGERY

## 2024-12-27 PROCEDURE — 1036F TOBACCO NON-USER: CPT | Performed by: SURGERY

## 2024-12-27 PROCEDURE — 99203 OFFICE O/P NEW LOW 30 MIN: CPT | Performed by: SURGERY

## 2024-12-27 PROCEDURE — G8482 FLU IMMUNIZE ORDER/ADMIN: HCPCS | Performed by: SURGERY

## 2024-12-27 PROCEDURE — G8428 CUR MEDS NOT DOCUMENT: HCPCS | Performed by: SURGERY

## 2024-12-27 PROCEDURE — G8417 CALC BMI ABV UP PARAM F/U: HCPCS | Performed by: SURGERY

## 2024-12-27 RX ORDER — SEMAGLUTIDE 1.34 MG/ML
INJECTION, SOLUTION SUBCUTANEOUS
COMMUNITY

## 2024-12-27 NOTE — PROGRESS NOTES
Vascular Surgery Outpatient Consultation      Chief Complaint   Patient presents with    Consultation     NP per Jordan DX OVD v.v KATHLEEN US scanned        Reason for Consult: Leg pain, PVD    Requesting Physician:  Dr. Glover    HISTORY OF PRESENT ILLNESS:                The patient is a 69 y.o. male who is referred for evaluation of leg pain and peripheral vascular disease.  The patient states a history of diabetes and chronic leg pain.  His leg pain is diffuse and can occur at rest and with ambulation.  He also complains of swelling especially around the left ankle.  He has a history of varicose vein surgery in the left leg approximately 6 years ago.  He denies any ulcerations in his feet or ankles.  He underwent arterial studies that showed normal ankle-brachial indices.  The patient does have a history of neuropathy affecting his lower legs and feet.    Past Medical History:        Diagnosis Date    BPH (benign prostatic hyperplasia)     Chronic back pain     DDD (degenerative disc disease), cervical     neck, back    Depression     Fibromyalgia     GERD (gastroesophageal reflux disease)     Headache     Hepatitis C     Irritable bowel syndrome     Joint pain     feet and ankle pain    Memory difficulties     Neck pain     Neuropathy of foot     Osteoarthritis     Type II or unspecified type diabetes mellitus without mention of complication, not stated as uncontrolled     Varicose veins of bilateral lower extremities with pain     Varicose veins of leg with pain, right 6/3/2021     Past Surgical History:        Procedure Laterality Date    ABSCESS DRAINAGE      left kidney    COLONOSCOPY      2012    ENDOSCOPY, COLON, DIAGNOSTIC      2012    FOOT SURGERY  1998    KNEE ARTHROSCOPY Left 11/04/2016    left knee arthroscopy medial menicestomy debridment synovectomy    MOUTH SURGERY      implants    VARICOSE VEIN SURGERY       Current Medications:   Prior to Admission medications    Medication Sig Start Date End

## 2025-01-28 ENCOUNTER — TELEPHONE (OUTPATIENT)
Dept: FAMILY MEDICINE CLINIC | Age: 70
End: 2025-01-28

## 2025-01-28 DIAGNOSIS — G89.29 CHRONIC MIDLINE LOW BACK PAIN WITHOUT SCIATICA: ICD-10-CM

## 2025-01-28 DIAGNOSIS — M54.2 CERVICODYNIA: Primary | ICD-10-CM

## 2025-01-28 DIAGNOSIS — M54.50 CHRONIC MIDLINE LOW BACK PAIN WITHOUT SCIATICA: ICD-10-CM

## 2025-01-28 NOTE — TELEPHONE ENCOUNTER
Pt called LM  states he called and LM  that he called and wants referral to physical therapy ayo I could not find  order in computer

## 2025-01-29 ENCOUNTER — TELEPHONE (OUTPATIENT)
Dept: FAMILY MEDICINE CLINIC | Age: 70
End: 2025-01-29

## 2025-01-29 NOTE — TELEPHONE ENCOUNTER
Amaya from [facility] called she stated they are out of network with the pt insurance.  Left message on machine for the pt to contact his insurance company to see who is in network. Then call the ofc. So a new referral can be placed.    Last seen 12/12/2024  Next appt 2/12/2025

## 2025-02-12 ENCOUNTER — OFFICE VISIT (OUTPATIENT)
Dept: FAMILY MEDICINE CLINIC | Age: 70
End: 2025-02-12
Payer: COMMERCIAL

## 2025-02-12 VITALS
WEIGHT: 219 LBS | BODY MASS INDEX: 29.66 KG/M2 | RESPIRATION RATE: 20 BRPM | HEART RATE: 62 BPM | DIASTOLIC BLOOD PRESSURE: 72 MMHG | OXYGEN SATURATION: 94 % | SYSTOLIC BLOOD PRESSURE: 124 MMHG | HEIGHT: 72 IN

## 2025-02-12 DIAGNOSIS — Z79.4 TYPE 2 DIABETES MELLITUS WITH HYPERGLYCEMIA, WITH LONG-TERM CURRENT USE OF INSULIN (HCC): Primary | ICD-10-CM

## 2025-02-12 DIAGNOSIS — Z12.5 PROSTATE CANCER SCREENING: ICD-10-CM

## 2025-02-12 DIAGNOSIS — R05.8 PRODUCTIVE COUGH: ICD-10-CM

## 2025-02-12 DIAGNOSIS — E11.65 TYPE 2 DIABETES MELLITUS WITH HYPERGLYCEMIA, WITH LONG-TERM CURRENT USE OF INSULIN (HCC): Primary | ICD-10-CM

## 2025-02-12 PROBLEM — M25.572 BILATERAL ANKLE PAIN: Status: RESOLVED | Noted: 2019-10-25 | Resolved: 2025-02-12

## 2025-02-12 PROBLEM — E66.9 OBESITY (BMI 30-39.9): Status: RESOLVED | Noted: 2019-01-02 | Resolved: 2025-02-12

## 2025-02-12 PROBLEM — M54.2 CERVICODYNIA: Status: RESOLVED | Noted: 2017-10-25 | Resolved: 2025-02-12

## 2025-02-12 PROBLEM — M25.571 BILATERAL ANKLE PAIN: Status: RESOLVED | Noted: 2019-10-25 | Resolved: 2025-02-12

## 2025-02-12 LAB
DIABETIC RETINOPATHY: NEGATIVE
HBA1C MFR BLD: 7.3 %

## 2025-02-12 PROCEDURE — 2022F DILAT RTA XM EVC RTNOPTHY: CPT | Performed by: FAMILY MEDICINE

## 2025-02-12 PROCEDURE — G8417 CALC BMI ABV UP PARAM F/U: HCPCS | Performed by: FAMILY MEDICINE

## 2025-02-12 PROCEDURE — 1036F TOBACCO NON-USER: CPT | Performed by: FAMILY MEDICINE

## 2025-02-12 PROCEDURE — 83036 HEMOGLOBIN GLYCOSYLATED A1C: CPT | Performed by: FAMILY MEDICINE

## 2025-02-12 PROCEDURE — G8427 DOCREV CUR MEDS BY ELIG CLIN: HCPCS | Performed by: FAMILY MEDICINE

## 2025-02-12 PROCEDURE — 99214 OFFICE O/P EST MOD 30 MIN: CPT | Performed by: FAMILY MEDICINE

## 2025-02-12 PROCEDURE — 1124F ACP DISCUSS-NO DSCNMKR DOCD: CPT | Performed by: FAMILY MEDICINE

## 2025-02-12 PROCEDURE — 3017F COLORECTAL CA SCREEN DOC REV: CPT | Performed by: FAMILY MEDICINE

## 2025-02-12 PROCEDURE — 3051F HG A1C>EQUAL 7.0%<8.0%: CPT | Performed by: FAMILY MEDICINE

## 2025-02-12 RX ORDER — EMPAGLIFLOZIN 25 MG/1
25 TABLET, FILM COATED ORAL DAILY
COMMUNITY
Start: 2025-01-04

## 2025-02-12 RX ORDER — GUAIFENESIN/DEXTROMETHORPHAN 100-10MG/5
5 SYRUP ORAL 3 TIMES DAILY PRN
Qty: 473 ML | Refills: 0 | Status: SHIPPED | OUTPATIENT
Start: 2025-02-12

## 2025-02-12 RX ORDER — INSULIN GLARGINE 100 [IU]/ML
35 INJECTION, SOLUTION SUBCUTANEOUS NIGHTLY
Qty: 5 ADJUSTABLE DOSE PRE-FILLED PEN SYRINGE | Refills: 5
Start: 2025-02-12

## 2025-02-12 SDOH — ECONOMIC STABILITY: FOOD INSECURITY: WITHIN THE PAST 12 MONTHS, THE FOOD YOU BOUGHT JUST DIDN'T LAST AND YOU DIDN'T HAVE MONEY TO GET MORE.: NEVER TRUE

## 2025-02-12 SDOH — ECONOMIC STABILITY: FOOD INSECURITY: WITHIN THE PAST 12 MONTHS, YOU WORRIED THAT YOUR FOOD WOULD RUN OUT BEFORE YOU GOT MONEY TO BUY MORE.: NEVER TRUE

## 2025-02-12 ASSESSMENT — PATIENT HEALTH QUESTIONNAIRE - PHQ9
SUM OF ALL RESPONSES TO PHQ9 QUESTIONS 1 & 2: 0
1. LITTLE INTEREST OR PLEASURE IN DOING THINGS: NOT AT ALL
SUM OF ALL RESPONSES TO PHQ QUESTIONS 1-9: 0
2. FEELING DOWN, DEPRESSED OR HOPELESS: NOT AT ALL

## 2025-02-12 NOTE — PROGRESS NOTES
[address], [address]   [place] [address]   [phone]   Denise Reagan MD     Patient: Nakia Moreno  Dateof Birth: 1955  Visit Date: 2/12/25    Nakia is a 69 y.o. year old male here today for   Chief Complaint   Patient presents with    Diabetes     States sees a specialist  at [facility]  ??       HPI  History of Present Illness  The patient is a 69-year-old male who presents for a follow-up of diabetes.    He was diagnosed with influenza and admitted to the emergency room on 02/02/2025, where he completed a course of Tamiflu. He continues to experience fatigue and a persistent cough, accompanied by mild shortness of breath. He reports no fever but mentions chest tightness and a rattling sound during coughing. He finds it difficult to expectorate mucus. He reports no wheezing, nausea, vomiting, or diarrhea. He also reports no dysuria. His appetite has decreased due to the influenza infection, which he believes has led to a reduction in his blood glucose levels. Despite being prescribed benzonatate, his cough remains unalleviated.    He is under the care of an endocrinologist for his diabetes management. He has not yet undergone an eye examination this year, with the last one conducted in 02/2024. He has an upcoming appointment scheduled for the end of this month. He reports no visual disturbances such as blurry or double vision. His current medication regimen includes Lantus 35 units at bedtime, glipizide, Jardiance, and Janumet. He was previously on Ozempic, but it was discontinued due to severe constipation that required emergency intervention by 3 surgeons.    Supplemental Information  He had some skin problem that started on his chest, like pimples all over, but they started to clear out.            Past medical, surgical, social and/or family historyreviewed, updated as needed, and are non-contributory (unless otherwise stated).  Medications,

## 2025-04-18 DIAGNOSIS — E11.65 TYPE 2 DIABETES MELLITUS WITH HYPERGLYCEMIA, WITH LONG-TERM CURRENT USE OF INSULIN (HCC): ICD-10-CM

## 2025-04-18 DIAGNOSIS — Z12.5 PROSTATE CANCER SCREENING: ICD-10-CM

## 2025-04-18 DIAGNOSIS — Z79.4 TYPE 2 DIABETES MELLITUS WITH HYPERGLYCEMIA, WITH LONG-TERM CURRENT USE OF INSULIN (HCC): ICD-10-CM

## 2025-04-18 LAB
ALBUMIN: 4 G/DL (ref 3.5–5.2)
ALP BLD-CCNC: 97 U/L (ref 40–129)
ALT SERPL-CCNC: 54 U/L (ref 0–50)
ANION GAP SERPL CALCULATED.3IONS-SCNC: 9 MMOL/L (ref 7–16)
AST SERPL-CCNC: 40 U/L (ref 0–50)
BILIRUB SERPL-MCNC: 0.3 MG/DL (ref 0–1.2)
BUN BLDV-MCNC: 22 MG/DL (ref 8–23)
CALCIUM SERPL-MCNC: 9.5 MG/DL (ref 8.8–10.2)
CHLORIDE BLD-SCNC: 103 MMOL/L (ref 98–107)
CHOLESTEROL, TOTAL: 169 MG/DL
CO2: 24 MMOL/L (ref 22–29)
CREAT SERPL-MCNC: 1.1 MG/DL (ref 0.7–1.2)
GFR, ESTIMATED: 75 ML/MIN/1.73M2
GLUCOSE BLD-MCNC: 231 MG/DL (ref 74–99)
HCT VFR BLD CALC: 43.4 % (ref 37–54)
HDLC SERPL-MCNC: 42 MG/DL
HEMOGLOBIN: 14.3 G/DL (ref 12.5–16.5)
LDL CHOLESTEROL: 96 MG/DL
MCH RBC QN AUTO: 31.6 PG (ref 26–35)
MCHC RBC AUTO-ENTMCNC: 32.9 G/DL (ref 32–34.5)
MCV RBC AUTO: 96 FL (ref 80–99.9)
PDW BLD-RTO: 12.9 % (ref 11.5–15)
PLATELET # BLD: 425 K/UL (ref 130–450)
PMV BLD AUTO: 11.7 FL (ref 7–12)
POTASSIUM SERPL-SCNC: 4.7 MMOL/L (ref 3.5–5.1)
PROSTATE SPECIFIC ANTIGEN: 2.01 NG/ML (ref 0–4)
RBC # BLD: 4.52 M/UL (ref 3.8–5.8)
SODIUM BLD-SCNC: 137 MMOL/L (ref 136–145)
TOTAL PROTEIN: 7.1 G/DL (ref 6.4–8.3)
TRIGL SERPL-MCNC: 153 MG/DL
TSH SERPL DL<=0.05 MIU/L-ACNC: 0.98 UIU/ML (ref 0.27–4.2)
VLDLC SERPL CALC-MCNC: 31 MG/DL
WBC # BLD: 8.7 K/UL (ref 4.5–11.5)

## 2025-05-22 ENCOUNTER — OFFICE VISIT (OUTPATIENT)
Dept: FAMILY MEDICINE CLINIC | Age: 70
End: 2025-05-22
Payer: MEDICARE

## 2025-05-22 VITALS
BODY MASS INDEX: 31.29 KG/M2 | SYSTOLIC BLOOD PRESSURE: 132 MMHG | RESPIRATION RATE: 20 BRPM | HEIGHT: 72 IN | OXYGEN SATURATION: 96 % | WEIGHT: 231 LBS | HEART RATE: 58 BPM | DIASTOLIC BLOOD PRESSURE: 72 MMHG

## 2025-05-22 DIAGNOSIS — Z79.4 TYPE 2 DIABETES MELLITUS WITH HYPERGLYCEMIA, WITH LONG-TERM CURRENT USE OF INSULIN (HCC): Primary | ICD-10-CM

## 2025-05-22 DIAGNOSIS — R63.5 ABNORMAL WEIGHT GAIN: ICD-10-CM

## 2025-05-22 DIAGNOSIS — E11.65 TYPE 2 DIABETES MELLITUS WITH HYPERGLYCEMIA, WITH LONG-TERM CURRENT USE OF INSULIN (HCC): Primary | ICD-10-CM

## 2025-05-22 DIAGNOSIS — Z79.4 TYPE 2 DIABETES MELLITUS WITH HYPERGLYCEMIA, WITH LONG-TERM CURRENT USE OF INSULIN (HCC): ICD-10-CM

## 2025-05-22 DIAGNOSIS — E11.65 TYPE 2 DIABETES MELLITUS WITH HYPERGLYCEMIA, WITH LONG-TERM CURRENT USE OF INSULIN (HCC): ICD-10-CM

## 2025-05-22 LAB
CREATININE URINE: 83.1 MG/DL (ref 40–278)
HBA1C MFR BLD: 7.2 %
MICROALBUMIN/CREAT 24H UR: <12 MG/L (ref 0–20)
MICROALBUMIN/CREAT UR-RTO: <14 MCG/MG CREAT (ref 0–30)

## 2025-05-22 PROCEDURE — 3051F HG A1C>EQUAL 7.0%<8.0%: CPT | Performed by: FAMILY MEDICINE

## 2025-05-22 PROCEDURE — 1159F MED LIST DOCD IN RCRD: CPT | Performed by: FAMILY MEDICINE

## 2025-05-22 PROCEDURE — 99214 OFFICE O/P EST MOD 30 MIN: CPT | Performed by: FAMILY MEDICINE

## 2025-05-22 PROCEDURE — 1036F TOBACCO NON-USER: CPT | Performed by: FAMILY MEDICINE

## 2025-05-22 PROCEDURE — 83036 HEMOGLOBIN GLYCOSYLATED A1C: CPT | Performed by: FAMILY MEDICINE

## 2025-05-22 PROCEDURE — G8427 DOCREV CUR MEDS BY ELIG CLIN: HCPCS | Performed by: FAMILY MEDICINE

## 2025-05-22 PROCEDURE — 1160F RVW MEDS BY RX/DR IN RCRD: CPT | Performed by: FAMILY MEDICINE

## 2025-05-22 PROCEDURE — G8417 CALC BMI ABV UP PARAM F/U: HCPCS | Performed by: FAMILY MEDICINE

## 2025-05-22 PROCEDURE — 2022F DILAT RTA XM EVC RTNOPTHY: CPT | Performed by: FAMILY MEDICINE

## 2025-05-22 PROCEDURE — 3017F COLORECTAL CA SCREEN DOC REV: CPT | Performed by: FAMILY MEDICINE

## 2025-05-22 PROCEDURE — 1124F ACP DISCUSS-NO DSCNMKR DOCD: CPT | Performed by: FAMILY MEDICINE

## 2025-05-22 RX ORDER — GLIPIZIDE 10 MG/1
TABLET ORAL
Qty: 90 TABLET | Refills: 5 | Status: SHIPPED | OUTPATIENT
Start: 2025-05-22

## 2025-05-22 ASSESSMENT — PATIENT HEALTH QUESTIONNAIRE - PHQ9
SUM OF ALL RESPONSES TO PHQ QUESTIONS 1-9: 0
1. LITTLE INTEREST OR PLEASURE IN DOING THINGS: NOT AT ALL
SUM OF ALL RESPONSES TO PHQ QUESTIONS 1-9: 0
SUM OF ALL RESPONSES TO PHQ QUESTIONS 1-9: 0
2. FEELING DOWN, DEPRESSED OR HOPELESS: NOT AT ALL
SUM OF ALL RESPONSES TO PHQ QUESTIONS 1-9: 0

## 2025-05-22 NOTE — PROGRESS NOTES
SENSOR) MISC Use for continuous glucose monitoring 2 each 12    glipiZIDE (GLUCOTROL) 10 MG tablet 20 mg po qam, 10 mg po qpm 90 tablet 5    Tirzepatide (MOUNJARO) 2.5 MG/0.5ML SOAJ pen Inject 2.5 mg into the skin every 7 days 2 mL 3    JARDIANCE 25 MG tablet Take 1 tablet by mouth daily      insulin glargine (LANTUS SOLOSTAR) 100 UNIT/ML injection pen Inject 35 Units into the skin nightly 5 Adjustable Dose Pre-filled Pen Syringe 5    guaiFENesin-dextromethorphan (ROBITUSSIN DM) 100-10 MG/5ML syrup Take 5 mLs by mouth 3 times daily as needed for Cough 473 mL 0    triamcinolone (KENALOG) 0.1 % ointment Apply topically 2 times daily to affected area. 80 g 0    Insulin Pen Needle 31G X 8 MM MISC 1 each by Does not apply route daily 100 each 3    diclofenac sodium (VOLTAREN) 1 % GEL APPLY 2 GRAMS TOPICALLY TWICE DAILY to back 100 g 3    Continuous Glucose  (FREESTYLE JENS 2 READER) VASU Use for continuous glucose monitoring 1 each 0    ELIQUIS 5 MG TABS tablet Take 1 tablet by mouth every 12 hours      tamsulosin (FLOMAX) 0.4 MG capsule TAKE TWO CAPSULES BY MOUTH DAILY 30 MINUTES AFTER EVENING MEAL      tadalafil (CIALIS) 20 MG tablet       metoprolol (LOPRESSOR) 100 MG tablet Take 1 tablet by mouth 2 times daily      clindamycin (CLEOCIN T) 1 % lotion Apply topically 2 times daily Apply topically 2 times daily.      Omega-3 Fatty Acids (FISH OIL) 300 MG CAPS Take by mouth      pantoprazole (PROTONIX) 40 MG tablet TAKE ONE TABLET BY MOUTH EVERY MORNING 30 tablet 5    mupirocin (BACTROBAN) 2 % ointment Apply topically 3 times daily. 30 g 5    atorvastatin (LIPITOR) 80 MG tablet Take 1 tablet by mouth nightly 30 tablet 5    finasteride (PROSCAR) 5 MG tablet Take 1 tablet by mouth daily      Blood Glucose Monitoring Suppl (ONE TOUCH ULTRA 2) w/Device KIT Check blood sugar BID 1 kit 0    Alcohol Swabs (ALCOHOL PREP) 70 % PADS Use BID for glucose testing 200 each 12    pregabalin (LYRICA) 75 MG capsule Take 1

## 2025-09-02 ENCOUNTER — OFFICE VISIT (OUTPATIENT)
Dept: FAMILY MEDICINE CLINIC | Age: 70
End: 2025-09-02
Payer: MEDICARE

## 2025-09-02 VITALS
SYSTOLIC BLOOD PRESSURE: 126 MMHG | WEIGHT: 231 LBS | BODY MASS INDEX: 31.29 KG/M2 | DIASTOLIC BLOOD PRESSURE: 78 MMHG | OXYGEN SATURATION: 97 % | HEIGHT: 72 IN | HEART RATE: 58 BPM | RESPIRATION RATE: 20 BRPM

## 2025-09-02 DIAGNOSIS — E11.65 TYPE 2 DIABETES MELLITUS WITH HYPERGLYCEMIA, WITH LONG-TERM CURRENT USE OF INSULIN (HCC): Primary | ICD-10-CM

## 2025-09-02 DIAGNOSIS — Z79.4 TYPE 2 DIABETES MELLITUS WITH HYPERGLYCEMIA, WITH LONG-TERM CURRENT USE OF INSULIN (HCC): Primary | ICD-10-CM

## 2025-09-02 LAB — HBA1C MFR BLD: 7.5 %

## 2025-09-02 PROCEDURE — 1160F RVW MEDS BY RX/DR IN RCRD: CPT | Performed by: FAMILY MEDICINE

## 2025-09-02 PROCEDURE — 1159F MED LIST DOCD IN RCRD: CPT | Performed by: FAMILY MEDICINE

## 2025-09-02 PROCEDURE — G8417 CALC BMI ABV UP PARAM F/U: HCPCS | Performed by: FAMILY MEDICINE

## 2025-09-02 PROCEDURE — 1124F ACP DISCUSS-NO DSCNMKR DOCD: CPT | Performed by: FAMILY MEDICINE

## 2025-09-02 PROCEDURE — 1036F TOBACCO NON-USER: CPT | Performed by: FAMILY MEDICINE

## 2025-09-02 PROCEDURE — 99214 OFFICE O/P EST MOD 30 MIN: CPT | Performed by: FAMILY MEDICINE

## 2025-09-02 PROCEDURE — 83036 HEMOGLOBIN GLYCOSYLATED A1C: CPT | Performed by: FAMILY MEDICINE

## 2025-09-02 PROCEDURE — G8427 DOCREV CUR MEDS BY ELIG CLIN: HCPCS | Performed by: FAMILY MEDICINE

## 2025-09-02 PROCEDURE — 3051F HG A1C>EQUAL 7.0%<8.0%: CPT | Performed by: FAMILY MEDICINE

## 2025-09-02 PROCEDURE — 2022F DILAT RTA XM EVC RTNOPTHY: CPT | Performed by: FAMILY MEDICINE

## 2025-09-02 PROCEDURE — 3017F COLORECTAL CA SCREEN DOC REV: CPT | Performed by: FAMILY MEDICINE

## 2025-09-02 RX ORDER — MUPIROCIN 2 %
OINTMENT (GRAM) TOPICAL
Qty: 30 G | Refills: 5 | Status: SHIPPED | OUTPATIENT
Start: 2025-09-02

## 2025-09-02 RX ORDER — INSULIN GLARGINE 100 [IU]/ML
45 INJECTION, SOLUTION SUBCUTANEOUS NIGHTLY
Qty: 5 ADJUSTABLE DOSE PRE-FILLED PEN SYRINGE | Refills: 5 | Status: SHIPPED | OUTPATIENT
Start: 2025-09-02